# Patient Record
Sex: FEMALE | Race: WHITE | NOT HISPANIC OR LATINO | Employment: OTHER | URBAN - METROPOLITAN AREA
[De-identification: names, ages, dates, MRNs, and addresses within clinical notes are randomized per-mention and may not be internally consistent; named-entity substitution may affect disease eponyms.]

---

## 2019-09-27 ENCOUNTER — APPOINTMENT (OUTPATIENT)
Dept: CARDIOLOGY | Facility: MEDICAL CENTER | Age: 59
DRG: 286 | End: 2019-09-27
Attending: INTERNAL MEDICINE
Payer: COMMERCIAL

## 2019-09-27 ENCOUNTER — HOSPITAL ENCOUNTER (INPATIENT)
Facility: MEDICAL CENTER | Age: 59
LOS: 4 days | DRG: 286 | End: 2019-10-02
Attending: EMERGENCY MEDICINE | Admitting: INTERNAL MEDICINE
Payer: COMMERCIAL

## 2019-09-27 ENCOUNTER — APPOINTMENT (OUTPATIENT)
Dept: RADIOLOGY | Facility: MEDICAL CENTER | Age: 59
DRG: 286 | End: 2019-09-27
Attending: EMERGENCY MEDICINE
Payer: COMMERCIAL

## 2019-09-27 DIAGNOSIS — I50.9 ACUTE CONGESTIVE HEART FAILURE, UNSPECIFIED HEART FAILURE TYPE (HCC): ICD-10-CM

## 2019-09-27 DIAGNOSIS — I50.9 NEW ONSET OF CONGESTIVE HEART FAILURE (HCC): ICD-10-CM

## 2019-09-27 PROBLEM — E03.9 HYPOTHYROIDISM: Status: ACTIVE | Noted: 2019-09-27

## 2019-09-27 PROBLEM — R79.89 ELEVATED TROPONIN: Status: ACTIVE | Noted: 2019-09-27

## 2019-09-27 PROBLEM — K21.9 GERD (GASTROESOPHAGEAL REFLUX DISEASE): Status: ACTIVE | Noted: 2019-09-27

## 2019-09-27 PROBLEM — E66.9 OBESITY: Status: ACTIVE | Noted: 2019-09-27

## 2019-09-27 PROBLEM — E78.5 HYPERLIPIDEMIA: Status: ACTIVE | Noted: 2019-09-27

## 2019-09-27 LAB
ALBUMIN SERPL BCP-MCNC: 4.1 G/DL (ref 3.2–4.9)
ALBUMIN/GLOB SERPL: 1.8 G/DL
ALP SERPL-CCNC: 56 U/L (ref 30–99)
ALT SERPL-CCNC: 30 U/L (ref 2–50)
ANION GAP SERPL CALC-SCNC: 8 MMOL/L (ref 0–11.9)
APTT PPP: 26.5 SEC (ref 24.7–36)
AST SERPL-CCNC: 25 U/L (ref 12–45)
BASOPHILS # BLD AUTO: 0.5 % (ref 0–1.8)
BASOPHILS # BLD: 0.05 K/UL (ref 0–0.12)
BILIRUB SERPL-MCNC: 0.9 MG/DL (ref 0.1–1.5)
BUN SERPL-MCNC: 18 MG/DL (ref 8–22)
CALCIUM SERPL-MCNC: 9.2 MG/DL (ref 8.5–10.5)
CHLORIDE SERPL-SCNC: 106 MMOL/L (ref 96–112)
CO2 SERPL-SCNC: 27 MMOL/L (ref 20–33)
CREAT SERPL-MCNC: 0.92 MG/DL (ref 0.5–1.4)
EKG IMPRESSION: NORMAL
EOSINOPHIL # BLD AUTO: 0.15 K/UL (ref 0–0.51)
EOSINOPHIL NFR BLD: 1.6 % (ref 0–6.9)
ERYTHROCYTE [DISTWIDTH] IN BLOOD BY AUTOMATED COUNT: 55 FL (ref 35.9–50)
EST. AVERAGE GLUCOSE BLD GHB EST-MCNC: 137 MG/DL
GLOBULIN SER CALC-MCNC: 2.3 G/DL (ref 1.9–3.5)
GLUCOSE SERPL-MCNC: 93 MG/DL (ref 65–99)
HBA1C MFR BLD: 6.4 % (ref 0–5.6)
HCT VFR BLD AUTO: 42.3 % (ref 37–47)
HGB BLD-MCNC: 13.5 G/DL (ref 12–16)
IMM GRANULOCYTES # BLD AUTO: 0.03 K/UL (ref 0–0.11)
IMM GRANULOCYTES NFR BLD AUTO: 0.3 % (ref 0–0.9)
INR PPP: 1.02 (ref 0.87–1.13)
LYMPHOCYTES # BLD AUTO: 2.24 K/UL (ref 1–4.8)
LYMPHOCYTES NFR BLD: 24 % (ref 22–41)
MAGNESIUM SERPL-MCNC: 2.2 MG/DL (ref 1.5–2.5)
MCH RBC QN AUTO: 30.3 PG (ref 27–33)
MCHC RBC AUTO-ENTMCNC: 31.9 G/DL (ref 33.6–35)
MCV RBC AUTO: 94.8 FL (ref 81.4–97.8)
MONOCYTES # BLD AUTO: 0.69 K/UL (ref 0–0.85)
MONOCYTES NFR BLD AUTO: 7.4 % (ref 0–13.4)
NEUTROPHILS # BLD AUTO: 6.18 K/UL (ref 2–7.15)
NEUTROPHILS NFR BLD: 66.2 % (ref 44–72)
NRBC # BLD AUTO: 0 K/UL
NRBC BLD-RTO: 0 /100 WBC
NT-PROBNP SERPL IA-MCNC: 6507 PG/ML (ref 0–125)
PLATELET # BLD AUTO: 311 K/UL (ref 164–446)
PMV BLD AUTO: 9.1 FL (ref 9–12.9)
POTASSIUM SERPL-SCNC: 3.6 MMOL/L (ref 3.6–5.5)
PROT SERPL-MCNC: 6.4 G/DL (ref 6–8.2)
PROTHROMBIN TIME: 13.6 SEC (ref 12–14.6)
RBC # BLD AUTO: 4.46 M/UL (ref 4.2–5.4)
SODIUM SERPL-SCNC: 141 MMOL/L (ref 135–145)
T4 FREE SERPL-MCNC: 1.64 NG/DL (ref 0.53–1.43)
TROPONIN T SERPL-MCNC: 33 NG/L (ref 6–19)
TROPONIN T SERPL-MCNC: 34 NG/L (ref 6–19)
TROPONIN T SERPL-MCNC: 34 NG/L (ref 6–19)
TSH SERPL DL<=0.005 MIU/L-ACNC: 0.02 UIU/ML (ref 0.38–5.33)
WBC # BLD AUTO: 9.3 K/UL (ref 4.8–10.8)

## 2019-09-27 PROCEDURE — 99205 OFFICE O/P NEW HI 60 MIN: CPT | Performed by: INTERNAL MEDICINE

## 2019-09-27 PROCEDURE — 93306 TTE W/DOPPLER COMPLETE: CPT

## 2019-09-27 PROCEDURE — 94760 N-INVAS EAR/PLS OXIMETRY 1: CPT

## 2019-09-27 PROCEDURE — 96376 TX/PRO/DX INJ SAME DRUG ADON: CPT

## 2019-09-27 PROCEDURE — 99220 PR INITIAL OBSERVATION CARE,LEVL III: CPT | Performed by: INTERNAL MEDICINE

## 2019-09-27 PROCEDURE — 84484 ASSAY OF TROPONIN QUANT: CPT

## 2019-09-27 PROCEDURE — 83880 ASSAY OF NATRIURETIC PEPTIDE: CPT

## 2019-09-27 PROCEDURE — 90471 IMMUNIZATION ADMIN: CPT

## 2019-09-27 PROCEDURE — 96374 THER/PROPH/DIAG INJ IV PUSH: CPT

## 2019-09-27 PROCEDURE — 99285 EMERGENCY DEPT VISIT HI MDM: CPT

## 2019-09-27 PROCEDURE — G0378 HOSPITAL OBSERVATION PER HR: HCPCS

## 2019-09-27 PROCEDURE — 96372 THER/PROPH/DIAG INJ SC/IM: CPT

## 2019-09-27 PROCEDURE — 85730 THROMBOPLASTIN TIME PARTIAL: CPT

## 2019-09-27 PROCEDURE — 700102 HCHG RX REV CODE 250 W/ 637 OVERRIDE(OP): Performed by: EMERGENCY MEDICINE

## 2019-09-27 PROCEDURE — 700117 HCHG RX CONTRAST REV CODE 255: Performed by: INTERNAL MEDICINE

## 2019-09-27 PROCEDURE — 84443 ASSAY THYROID STIM HORMONE: CPT

## 2019-09-27 PROCEDURE — 90686 IIV4 VACC NO PRSV 0.5 ML IM: CPT | Performed by: INTERNAL MEDICINE

## 2019-09-27 PROCEDURE — 85610 PROTHROMBIN TIME: CPT

## 2019-09-27 PROCEDURE — 36415 COLL VENOUS BLD VENIPUNCTURE: CPT

## 2019-09-27 PROCEDURE — 700111 HCHG RX REV CODE 636 W/ 250 OVERRIDE (IP): Performed by: INTERNAL MEDICINE

## 2019-09-27 PROCEDURE — 71045 X-RAY EXAM CHEST 1 VIEW: CPT

## 2019-09-27 PROCEDURE — 80053 COMPREHEN METABOLIC PANEL: CPT

## 2019-09-27 PROCEDURE — 3E02340 INTRODUCTION OF INFLUENZA VACCINE INTO MUSCLE, PERCUTANEOUS APPROACH: ICD-10-PCS | Performed by: INTERNAL MEDICINE

## 2019-09-27 PROCEDURE — 700111 HCHG RX REV CODE 636 W/ 250 OVERRIDE (IP): Performed by: EMERGENCY MEDICINE

## 2019-09-27 PROCEDURE — 84439 ASSAY OF FREE THYROXINE: CPT

## 2019-09-27 PROCEDURE — 93005 ELECTROCARDIOGRAM TRACING: CPT | Performed by: EMERGENCY MEDICINE

## 2019-09-27 PROCEDURE — A9270 NON-COVERED ITEM OR SERVICE: HCPCS | Performed by: INTERNAL MEDICINE

## 2019-09-27 PROCEDURE — A9270 NON-COVERED ITEM OR SERVICE: HCPCS | Performed by: EMERGENCY MEDICINE

## 2019-09-27 PROCEDURE — 700102 HCHG RX REV CODE 250 W/ 637 OVERRIDE(OP): Performed by: INTERNAL MEDICINE

## 2019-09-27 PROCEDURE — 83036 HEMOGLOBIN GLYCOSYLATED A1C: CPT

## 2019-09-27 PROCEDURE — 83735 ASSAY OF MAGNESIUM: CPT

## 2019-09-27 PROCEDURE — 93005 ELECTROCARDIOGRAM TRACING: CPT

## 2019-09-27 PROCEDURE — 85025 COMPLETE CBC W/AUTO DIFF WBC: CPT

## 2019-09-27 RX ORDER — ONDANSETRON 4 MG/1
4 TABLET, ORALLY DISINTEGRATING ORAL EVERY 4 HOURS PRN
Status: DISCONTINUED | OUTPATIENT
Start: 2019-09-27 | End: 2019-10-02 | Stop reason: HOSPADM

## 2019-09-27 RX ORDER — LEVOTHYROXINE SODIUM 0.2 MG/1
200 TABLET ORAL
COMMUNITY
End: 2022-02-09

## 2019-09-27 RX ORDER — OMEPRAZOLE 20 MG/1
20 CAPSULE, DELAYED RELEASE ORAL DAILY
Status: DISCONTINUED | OUTPATIENT
Start: 2019-09-28 | End: 2019-10-02 | Stop reason: HOSPADM

## 2019-09-27 RX ORDER — LEVOTHYROXINE SODIUM 0.1 MG/1
200 TABLET ORAL
Status: DISCONTINUED | OUTPATIENT
Start: 2019-09-28 | End: 2019-10-02 | Stop reason: HOSPADM

## 2019-09-27 RX ORDER — FUROSEMIDE 10 MG/ML
40 INJECTION INTRAMUSCULAR; INTRAVENOUS
Status: DISCONTINUED | OUTPATIENT
Start: 2019-09-27 | End: 2019-10-02

## 2019-09-27 RX ORDER — FUROSEMIDE 10 MG/ML
40 INJECTION INTRAMUSCULAR; INTRAVENOUS ONCE
Status: COMPLETED | OUTPATIENT
Start: 2019-09-27 | End: 2019-09-27

## 2019-09-27 RX ORDER — RABEPRAZOLE SODIUM 20 MG/1
20 TABLET, DELAYED RELEASE ORAL DAILY
Status: ON HOLD | COMMUNITY
End: 2019-10-02

## 2019-09-27 RX ORDER — POLYETHYLENE GLYCOL 3350 17 G/17G
1 POWDER, FOR SOLUTION ORAL
Status: DISCONTINUED | OUTPATIENT
Start: 2019-09-27 | End: 2019-10-02 | Stop reason: HOSPADM

## 2019-09-27 RX ORDER — SIMVASTATIN 40 MG
40 TABLET ORAL NIGHTLY
Status: ON HOLD | COMMUNITY
End: 2019-10-02

## 2019-09-27 RX ORDER — PROCHLORPERAZINE EDISYLATE 5 MG/ML
5-10 INJECTION INTRAMUSCULAR; INTRAVENOUS EVERY 4 HOURS PRN
Status: DISCONTINUED | OUTPATIENT
Start: 2019-09-27 | End: 2019-10-02 | Stop reason: HOSPADM

## 2019-09-27 RX ORDER — PROMETHAZINE HYDROCHLORIDE 25 MG/1
12.5-25 TABLET ORAL EVERY 4 HOURS PRN
Status: DISCONTINUED | OUTPATIENT
Start: 2019-09-27 | End: 2019-10-02 | Stop reason: HOSPADM

## 2019-09-27 RX ORDER — ACETAMINOPHEN 325 MG/1
650 TABLET ORAL EVERY 6 HOURS PRN
Status: DISCONTINUED | OUTPATIENT
Start: 2019-09-27 | End: 2019-10-02 | Stop reason: HOSPADM

## 2019-09-27 RX ORDER — ASPIRIN 81 MG/1
81 TABLET, CHEWABLE ORAL DAILY
Status: DISCONTINUED | OUTPATIENT
Start: 2019-09-27 | End: 2019-10-02 | Stop reason: HOSPADM

## 2019-09-27 RX ORDER — AMOXICILLIN 250 MG
2 CAPSULE ORAL 2 TIMES DAILY
Status: DISCONTINUED | OUTPATIENT
Start: 2019-09-27 | End: 2019-10-02 | Stop reason: HOSPADM

## 2019-09-27 RX ORDER — CLONIDINE HYDROCHLORIDE 0.1 MG/1
0.1 TABLET ORAL EVERY 6 HOURS PRN
Status: DISCONTINUED | OUTPATIENT
Start: 2019-09-27 | End: 2019-10-02 | Stop reason: HOSPADM

## 2019-09-27 RX ORDER — SIMVASTATIN 20 MG
20 TABLET ORAL NIGHTLY
Status: DISCONTINUED | OUTPATIENT
Start: 2019-09-27 | End: 2019-10-02 | Stop reason: HOSPADM

## 2019-09-27 RX ORDER — BISACODYL 10 MG
10 SUPPOSITORY, RECTAL RECTAL
Status: DISCONTINUED | OUTPATIENT
Start: 2019-09-27 | End: 2019-10-02 | Stop reason: HOSPADM

## 2019-09-27 RX ORDER — HEPARIN SODIUM 5000 [USP'U]/ML
5000 INJECTION, SOLUTION INTRAVENOUS; SUBCUTANEOUS EVERY 8 HOURS
Status: DISCONTINUED | OUTPATIENT
Start: 2019-09-27 | End: 2019-10-02 | Stop reason: HOSPADM

## 2019-09-27 RX ORDER — ENALAPRILAT 1.25 MG/ML
1.25 INJECTION INTRAVENOUS EVERY 6 HOURS PRN
Status: DISCONTINUED | OUTPATIENT
Start: 2019-09-27 | End: 2019-10-02 | Stop reason: HOSPADM

## 2019-09-27 RX ORDER — ONDANSETRON 2 MG/ML
4 INJECTION INTRAMUSCULAR; INTRAVENOUS EVERY 4 HOURS PRN
Status: DISCONTINUED | OUTPATIENT
Start: 2019-09-27 | End: 2019-10-02 | Stop reason: HOSPADM

## 2019-09-27 RX ORDER — MAGNESIUM OXIDE 400 MG/1
400 TABLET ORAL
COMMUNITY
End: 2019-09-27

## 2019-09-27 RX ORDER — PROMETHAZINE HYDROCHLORIDE 25 MG/1
12.5-25 SUPPOSITORY RECTAL EVERY 4 HOURS PRN
Status: DISCONTINUED | OUTPATIENT
Start: 2019-09-27 | End: 2019-10-02 | Stop reason: HOSPADM

## 2019-09-27 RX ADMIN — HEPARIN SODIUM 5000 UNITS: 5000 INJECTION INTRAVENOUS; SUBCUTANEOUS at 21:14

## 2019-09-27 RX ADMIN — ASPIRIN 325 MG: 325 TABLET, COATED ORAL at 09:49

## 2019-09-27 RX ADMIN — INFLUENZA A VIRUS A/BRISBANE/02/2018 IVR-190 (H1N1) ANTIGEN (FORMALDEHYDE INACTIVATED), INFLUENZA A VIRUS A/KANSAS/14/2017 X-327 (H3N2) ANTIGEN (FORMALDEHYDE INACTIVATED), INFLUENZA B VIRUS B/PHUKET/3073/2013 ANTIGEN (FORMALDEHYDE INACTIVATED), AND INFLUENZA B VIRUS B/MARYLAND/15/2016 BX-69A ANTIGEN (FORMALDEHYDE INACTIVATED) 0.5 ML: 15; 15; 15; 15 INJECTION, SUSPENSION INTRAMUSCULAR at 15:46

## 2019-09-27 RX ADMIN — HEPARIN SODIUM 5000 UNITS: 5000 INJECTION INTRAVENOUS; SUBCUTANEOUS at 15:46

## 2019-09-27 RX ADMIN — SIMVASTATIN 20 MG: 20 TABLET, FILM COATED ORAL at 21:16

## 2019-09-27 RX ADMIN — FUROSEMIDE 40 MG: 10 INJECTION, SOLUTION INTRAMUSCULAR; INTRAVENOUS at 09:49

## 2019-09-27 RX ADMIN — HUMAN ALBUMIN MICROSPHERES AND PERFLUTREN 3 ML: 10; .22 INJECTION, SOLUTION INTRAVENOUS at 20:30

## 2019-09-27 RX ADMIN — FUROSEMIDE 40 MG: 10 INJECTION, SOLUTION INTRAMUSCULAR; INTRAVENOUS at 15:45

## 2019-09-27 ASSESSMENT — COGNITIVE AND FUNCTIONAL STATUS - GENERAL
DAILY ACTIVITIY SCORE: 24
SUGGESTED CMS G CODE MODIFIER DAILY ACTIVITY: CH
MOBILITY SCORE: 24
SUGGESTED CMS G CODE MODIFIER MOBILITY: CH

## 2019-09-27 ASSESSMENT — PATIENT HEALTH QUESTIONNAIRE - PHQ9
1. LITTLE INTEREST OR PLEASURE IN DOING THINGS: NOT AT ALL
SUM OF ALL RESPONSES TO PHQ9 QUESTIONS 1 AND 2: 0
2. FEELING DOWN, DEPRESSED, IRRITABLE, OR HOPELESS: NOT AT ALL

## 2019-09-27 ASSESSMENT — COPD QUESTIONNAIRES
IN THE PAST 12 MONTHS DO YOU DO LESS THAN YOU USED TO BECAUSE OF YOUR BREATHING PROBLEMS: DISAGREE/UNSURE
DO YOU EVER COUGH UP ANY MUCUS OR PHLEGM?: YES, A FEW DAYS A WEEK OR MONTH
DURING THE PAST 4 WEEKS HOW MUCH DID YOU FEEL SHORT OF BREATH: SOME OF THE TIME
COPD SCREENING SCORE: 6
DURING THE PAST 4 WEEKS HOW MUCH DID YOU FEEL SHORT OF BREATH: MOST  OR ALL OF THE TIME
COPD SCREENING SCORE: 6
HAVE YOU SMOKED AT LEAST 100 CIGARETTES IN YOUR ENTIRE LIFE: YES
HAVE YOU SMOKED AT LEAST 100 CIGARETTES IN YOUR ENTIRE LIFE: YES
DO YOU EVER COUGH UP ANY MUCUS OR PHLEGM?: NO/ONLY WITH OCCASIONAL COLDS OR INFECTIONS

## 2019-09-27 ASSESSMENT — LIFESTYLE VARIABLES
EVER HAD A DRINK FIRST THING IN THE MORNING TO STEADY YOUR NERVES TO GET RID OF A HANGOVER: NO
EVER FELT BAD OR GUILTY ABOUT YOUR DRINKING: NO
TOTAL SCORE: 0
HOW MANY TIMES IN THE PAST YEAR HAVE YOU HAD 5 OR MORE DRINKS IN A DAY: 0
TOTAL SCORE: 0
TOTAL SCORE: 0
HAVE YOU EVER FELT YOU SHOULD CUT DOWN ON YOUR DRINKING: NO
ALCOHOL_USE: NO
DOES PATIENT WANT TO STOP DRINKING: NO
CONSUMPTION TOTAL: NEGATIVE
EVER_SMOKED: YES
HAVE PEOPLE ANNOYED YOU BY CRITICIZING YOUR DRINKING: NO
EVER_SMOKED: YES
ON A TYPICAL DAY WHEN YOU DRINK ALCOHOL HOW MANY DRINKS DO YOU HAVE: 0
AVERAGE NUMBER OF DAYS PER WEEK YOU HAVE A DRINK CONTAINING ALCOHOL: 0

## 2019-09-27 NOTE — CONSULTS
Cardiology Initial Consultation    Date of Service  2019    Referring Physician  Garrick Mejia M.D.    Reason for Consultation  New diagnosis of CHF, PERAZA    History of Presenting Illness  Ivelisse Montague is a 59 y.o. female who presented 2019 with worsening shortness of breath. Noted starting just over a month ago, she was having worsening shortness of breath with exertion and lower extremity edema.  Upon presentation, she was feeling short of breath at rest.    She is not limited by chest pain, pressure or tightness.   No significant palpitations, dizziness, or presyncope/syncope.   No symptoms of leg claudication.   No stroke/TIA like symptoms.    Mother had congestive heart failure but later in life in her 70s and had diabetes.  Father started having MIs in his 60s  of an MI at 63.  Brother  of MI at 23.  2 other brothers had MIs starting in their 60s and 70s.    Has hyperlipidemia, takes simvastatin, previously on Crestor but insurance stopped covering this.  Was previously a smoker for 40 years but quit in .  Has hypothyroidism, on therapy  Has gastroesophageal reflux disease.    No hypertension, no diabetes, no chronic kidney disease, no lupus or rheumatoid arthritis.  Blood pressure has been normal.    Troponin T 34  proBNP 6507  TSH 0.02  Sodium 143, potassium 3.6, creatinine 0.92, LFTs normal    Hemoglobin 13.5, platelets 311    Reports she is having some leg cramps.    Review of Systems  Review of Systems   All other systems reviewed and are negative.      Past Medical History   has a past medical history of GERD (gastroesophageal reflux disease), Hyperlipidemia, RLS (restless legs syndrome), and Thyroid disease.    Surgical History   has a past surgical history that includes knee replacement, total.    Family History  family history is not on file.    Social History   reports that she quit smoking about 11 years ago. Her smoking use included cigarettes. She has never used  smokeless tobacco. She reports that she does not drink alcohol or use drugs.    Medications  Prior to Admission Medications   Prescriptions Last Dose Informant Patient Reported? Taking?   levothyroxine (SYNTHROID) 200 MCG Tab 9/27/2019 at Unknown time Patient's Home Pharmacy Yes Yes   Sig: Take 200 mcg by mouth Every morning on an empty stomach.   methylPREDNISolone (MEDROL, ARTURO, PO) 9/26/2019 at COMPLETED Patient's Home Pharmacy Yes Yes   Sig: Take 1 Dose by mouth See Admin Instructions.   rabeprazole (ACIPHEX) 20 MG tablet 9/27/2019 at AM Patient's Home Pharmacy Yes Yes   Sig: Take 20 mg by mouth every day.   simvastatin (ZOCOR) 40 MG Tab 9/26/2019 at PM Patient's Home Pharmacy Yes Yes   Sig: Take 40 mg by mouth every evening.      Facility-Administered Medications: None       Allergies  Allergies   Allergen Reactions   • Codeine Vomiting   • Lipitor [Atorvastatin Calcium]        Vital signs in last 24 hours  Temp:  [36.1 °C (96.9 °F)-36.6 °C (97.8 °F)] 36.1 °C (96.9 °F)  Pulse:  [72-98] 92  Resp:  [18-22] 20  BP: (117-135)/(77-98) 118/83  SpO2:  [93 %-98 %] 98 %    Physical Exam  Physical Exam   General: No acute distress. Well nourished.  HEENT: EOM grossly intact, no scleral icterus, no pharyngeal erythema.   Neck:  No JVD, no bruits, trachea midline  CVS: RRR. Normal S1, S2. No M/R/G. No LE edema.  2+ radial pulses, 2+ PT pulses  Resp: CTAB. No wheezing or crackles/rhonchi. Normal respiratory effort.  Abdomen: Soft, NT, no tricia hepatomegaly.  MSK/Ext: No clubbing or cyanosis.  Skin: Warm and dry, no rashes.  Neurological: CN III-XII grossly intact. No focal deficits.   Psych: A&O x 3, appropriate affect, good judgement      Lab Review  Lab Results   Component Value Date/Time    WBC 9.3 09/27/2019 08:45 AM    RBC 4.46 09/27/2019 08:45 AM    HEMOGLOBIN 13.5 09/27/2019 08:45 AM    HEMATOCRIT 42.3 09/27/2019 08:45 AM    MCV 94.8 09/27/2019 08:45 AM    MCH 30.3 09/27/2019 08:45 AM    MCHC 31.9 (L) 09/27/2019 08:45  AM    MPV 9.1 09/27/2019 08:45 AM      Lab Results   Component Value Date/Time    SODIUM 141 09/27/2019 08:45 AM    POTASSIUM 3.6 09/27/2019 08:45 AM    CHLORIDE 106 09/27/2019 08:45 AM    CO2 27 09/27/2019 08:45 AM    GLUCOSE 93 09/27/2019 08:45 AM    BUN 18 09/27/2019 08:45 AM    CREATININE 0.92 09/27/2019 08:45 AM      Lab Results   Component Value Date/Time    ASTSGOT 25 09/27/2019 08:45 AM    ALTSGPT 30 09/27/2019 08:45 AM     Lab Results   Component Value Date/Time    TROPONINT 33 (H) 09/27/2019 04:04 PM       Recent Labs     09/27/19  0845   NTPROBNP 6507*       Cardiac Imaging and Procedures Review  EKG:  My personal interpretation of the EKG dated 9/27/2019 is sinus, 93, nonspecific T wave changes, PVC    Echocardiogram: Pending      Imaging  Chest X-Ray: 9/27/2019  Left mid to lower lung opacity.  There is no evidence of pleural effusion.  Cardiomegaly.      Assessment/Plan  -New onset heart failure, EF not yet known  -Strong family history of premature coronary artery disease  -Prior 40-year tobacco history, quit 2007  -Hyperlipidemia  -PVCs    Plan:  -Echocardiogram pending  -Diuresis  -Medications based on echo finding  -Left heart catheterization she has a low EF given her strong family history    Case discussed with Garrick Mejia M.D.     Thank you for allowing me to participate in the care of this patient.    I will continue to follow this patient    Please contact me with any questions.    Yoli Sebastian M.D.   Cardiologist, [facility]  [phone]

## 2019-09-27 NOTE — ASSESSMENT & PLAN NOTE
-cont on  diuresis with IV Lasix 40 mg twice daily.  Close intake and output monitoring, and daily weights.    -Echo showing EF of 20%   Cardiac cath today.

## 2019-09-27 NOTE — ED PROVIDER NOTES
ED Provider Note    CHIEF COMPLAINT  Chief Complaint   Patient presents with   • Shortness of Breath   • Ankle Swelling       HPI  Ivelisse Montague is a 59 y.o. female who presents to the emergency department complaining of shortness of breath leg edema getting worse for the last couple of months.  The patient called her doctor and was told that she needed to come in for a chest x-ray and at the time of arrival she states that she only wants a chest x-ray.  She does not recognize any specific exacerbating or alleviating factors otherwise.  She says she is never been diagnosed with congestive heart failure or pulmonary edema.    REVIEW OF SYSTEMS no fever or chills no hemoptysis.  All other systems negative    PAST MEDICAL HISTORY  Past Medical History:   Diagnosis Date   • GERD (gastroesophageal reflux disease)    • Hyperlipidemia    • RLS (restless legs syndrome)    • Thyroid disease        FAMILY HISTORY  History reviewed. No pertinent family history.    SOCIAL HISTORY  Social History     Socioeconomic History   • Marital status: Single     Spouse name: Not on file   • Number of children: Not on file   • Years of education: Not on file   • Highest education level: Not on file   Occupational History   • Not on file   Social Needs   • Financial resource strain: Not on file   • Food insecurity:     Worry: Not on file     Inability: Not on file   • Transportation needs:     Medical: Not on file     Non-medical: Not on file   Tobacco Use   • Smoking status: Former Smoker     Types: Cigarettes     Last attempt to quit: 2008     Years since quittin.4   • Smokeless tobacco: Never Used   Substance and Sexual Activity   • Alcohol use: No   • Drug use: No   • Sexual activity: Not on file   Lifestyle   • Physical activity:     Days per week: Not on file     Minutes per session: Not on file   • Stress: Not on file   Relationships   • Social connections:     Talks on phone: Not on file     Gets together: Not on  "file     Attends Mandaen service: Not on file     Active member of club or organization: Not on file     Attends meetings of clubs or organizations: Not on file     Relationship status: Not on file   • Intimate partner violence:     Fear of current or ex partner: Not on file     Emotionally abused: Not on file     Physically abused: Not on file     Forced sexual activity: Not on file   Other Topics Concern   • Not on file   Social History Narrative   • Not on file       SURGICAL HISTORY  Past Surgical History:   Procedure Laterality Date   • KNEE REPLACEMENT, TOTAL         CURRENT MEDICATIONS  Home Medications     Reviewed by Darian Connor (Pharmacy Tech) on 09/27/19 at 1022  Med List Status: Complete   Medication Last Dose Status   levothyroxine (SYNTHROID) 200 MCG Tab 9/27/2019 Active   methylPREDNISolone (MEDROL, ARTURO, PO) 9/26/2019 Active   rabeprazole (ACIPHEX) 20 MG tablet 9/27/2019 Active   simvastatin (ZOCOR) 40 MG Tab 9/26/2019 Active                ALLERGIES  Allergies   Allergen Reactions   • Codeine Vomiting   • Lipitor [Atorvastatin Calcium]        PHYSICAL EXAM  VITAL SIGNS: /77   Pulse 88   Temp 36.6 °C (97.8 °F) (Temporal)   Resp 20   Ht 1.575 m (5' 2\")   Wt 100.4 kg (221 lb 5.5 oz)   SpO2 93%   BMI 40.48 kg/m²    Oxygen saturation is interpreted as adequate  Constitutional: Awake verbal and short of breath with any activity even having a discussion.  HENT: Mucous membranes are moist  Eyes: No erythema discharge or jaundice  Neck: Trachea midline no JVD  Cardiovascular: Regular rate and rhythm  Lungs: Distant bilaterally and the patient appears short of breath  Abdomen/Back: Obese nondistended nontender no peritoneal findings bowel sounds present  Skin: Warm and dry  Musculoskeletal: There is symmetric 1+ edema of the lower extremities bilaterally  Neurologic: Awake verbal moving all extremities    Laboratory  CBC shows white blood cell count of 9.3 hemoglobin is adequate 13.5 " complete metabolic panel is unremarkable troponin is elevated at 34 the BNP is very high at 6507 INR is 1.02 TSH is very low at 0.02 indicating hyperthyroidism    EKG interpretation  Twelve-lead EKG shows sinus rhythm 93 bpm there is a left axis deviation there is no clear ST elevation or depression there is a single PVC on the tracing QTc interval is prolonged at 528 ms    Radiology  DX-CHEST-PORTABLE (1 VIEW)   Final Result      Left mid to lower lung atelectasis possible pneumonitis.      EC-ECHOCARDIOGRAM COMPLETE W/O CONT    (Results Pending)     MEDICAL DECISION MAKING and DISPOSITION  In the emergency department I reviewed the patient's symptoms with her and recommended that we do more evaluation than just a chest x-ray and after some discussion she did consent to this in the evaluation reviewed above was undertaken.  The patient appears to be in acute pulmonary edema likely congestive heart failure.  She was started on aspirin and given Lasix.  In addition her QTc interval is prolonged and she has hyperthyroidism.  I reviewed the findings available with the patient and I reviewed the case with the hospitalist and the patient is admitted for further evaluation and treatment    IMPRESSION  1.  Acute pulmonary edema  2.  Prolonged QTc interval  3.  Hyperthyroidism          Electronically signed by: Garrick Mejia, 9/27/2019 2:42 PM

## 2019-09-27 NOTE — ED TRIAGE NOTES
Patient comes in requesting an x-ray.  Called pcp and told to come here as she is feeling increasing sob and noted edema to her legs.  Patient is only wanting a chest x-ray.  Explained to patient the process and apologized for delay.

## 2019-09-27 NOTE — PROGRESS NOTES
Received report from SAUL Navarro (ED). Patient is A&Ox4. Patient transported via [facility] with ACLS RN and Zoll monitor. Patient arrived to unit, placed on tele box. Confirmed SR with monitor room. Patient ambulated steadily with standby assist from rney to hospital bed, tolerated well. Pt oriented to unit and call light, verbalized understanding. Fall precautions in place. Call light and belongings within reach. Bed locked and in lowest position. Assessment completed, will continue to monitor.

## 2019-09-27 NOTE — H&P
"Hospital Medicine History & Physical Note    Date of Service  9/27/2019    Primary Care Physician  Sly Khan M.D.    Consultants  Cardiology    Code Status  Full    Chief Complaint  Shortness of breath    History of Presenting Illness  59 y.o. female with obesity, GERD, hypothyroidism, and hyperlipidemia, nondiabetic, with no prior cardiac history, who presented 9/27/2019 with shortness of breath, and leg edema.  Patient states that she started to notice progressively worsening shortness of breath, and leg swelling in the last 2 to 3 months, which was significantly worse in the last 1 week.  She gets easily winded and dyspneic with exertion, with onset of orthopnea and paroxysmal nocturnal dyspnea.  Her leg swelling was also significantly worse in the last 1 week, with occasional \"charley horses\".  She denied any chest pain, abdominal complaints such as nausea, vomiting, bowel movement changes, cough, fevers, or chills.  She decided to go to the ED today for further evaluation.    ED course:  The patient was initially evaluated.  Vital signs were stable.  Initial blood work-up showed troponin of 34, with NT proBNP of 6507.  Her CBC and BMP were unremarkable.  Checks x-ray (personally reviewed) showed left mid lower lung opacities, with no pleural effusion, with noted cardiomegaly.  EKG (my read) showed normal sinus rhythm, with no dynamic ischemic changes.  Patient was given IV Lasix, and aspirin.  She was subsequently admitted to the hospital service.    Review of Systems  ROS     Pertinent positives/negatives as mentioned above.     A complete review of systems was personally done by me. All other systems were negative.       Past Medical History   has a past medical history of GERD (gastroesophageal reflux disease), Hyperlipidemia, RLS (restless legs syndrome), and Thyroid disease.    Surgical History   has a past surgical history that includes knee replacement, total.     Family History  Reviewed and " not pertinent.       Social History   reports that she quit smoking about 11 years ago. Her smoking use included cigarettes. She has never used smokeless tobacco. She reports that she does not drink alcohol or use drugs.    Allergies  Allergies   Allergen Reactions   • Codeine Vomiting   • Lipitor [Atorvastatin Calcium]        Medications  Prior to Admission Medications   Prescriptions Last Dose Informant Patient Reported? Taking?   aspirin (ASA) 81 MG CHEW 9/1/2019  Yes No   Sig: Take 81 mg by mouth every day.   levothyroxine (SYNTHROID) 200 MCG Tab 9/27/2019 at Unknown time  Yes Yes   Sig: Take 200 mcg by mouth Every morning on an empty stomach.   magnesium oxide (MAG-OX) 400 MG Tab 9/27/2019 at Unknown time  Yes Yes   Sig: Take 400 mg by mouth every 48 hours.   pantoprazole (PROTONIX) 40 MG TBEC 9/27/2019 at Unknown time  Yes No   Sig: Take 40 mg by mouth every day.   simvastatin (ZOCOR) 20 MG Tab 9/25/2019  Yes Yes   Sig: Take 20 mg by mouth every evening.      Facility-Administered Medications: None       Physical Exam  Temp:  [36.2 °C (97.2 °F)] 36.2 °C (97.2 °F)  Pulse:  [88-95] 88  Resp:  [22] 22  BP: (123-135)/(82-89) 123/89  SpO2:  [94 %-96 %] 95 %    Physical Exam   Constitutional: She is oriented to person, place, and time. She appears well-developed. No distress.   Morbidly obese. Body mass index is 41.33 kg/m².   HENT:   Head: Normocephalic and atraumatic.   Mouth/Throat: Oropharynx is clear and moist. No oropharyngeal exudate.   Eyes: Pupils are equal, round, and reactive to light. Conjunctivae are normal. No scleral icterus.   Neck: Normal range of motion. Neck supple. JVD present.   Cardiovascular: Normal rate and regular rhythm. Exam reveals no gallop and no friction rub.   No murmur heard.  Pulmonary/Chest: Effort normal. No respiratory distress. She has no wheezes. She has no rales. She exhibits no tenderness.   Diminished air entry B/L bases   Abdominal: Soft. Bowel sounds are normal. She  exhibits no distension. There is no tenderness. There is no rebound and no guarding.   Musculoskeletal: Normal range of motion. She exhibits edema ( 2-3+ B/L LE edema). She exhibits no tenderness.   Lymphadenopathy:     She has no cervical adenopathy.   Neurological: She is alert and oriented to person, place, and time. No cranial nerve deficit.   Skin: Skin is warm and dry. No rash noted. She is not diaphoretic. No erythema. No pallor.   Psychiatric: She has a normal mood and affect. Her behavior is normal. Judgment and thought content normal.   Nursing note and vitals reviewed.      Laboratory:  Recent Labs     09/27/19  0845   WBC 9.3   RBC 4.46   HEMOGLOBIN 13.5   HEMATOCRIT 42.3   MCV 94.8   MCH 30.3   MCHC 31.9*   RDW 55.0*   PLATELETCT 311   MPV 9.1     Recent Labs     09/27/19  0845   SODIUM 141   POTASSIUM 3.6   CHLORIDE 106   CO2 27   GLUCOSE 93   BUN 18   CREATININE 0.92   CALCIUM 9.2     Recent Labs     09/27/19  0845   ALTSGPT 30   ASTSGOT 25   ALKPHOSPHAT 56   TBILIRUBIN 0.9   GLUCOSE 93     Recent Labs     09/27/19  0845   APTT 26.5   INR 1.02     Recent Labs     09/27/19  0845   NTPROBNP 6507*         Recent Labs     09/27/19  0845   TROPONINT 34*       Urinalysis:    No results found     Imaging:  DX-CHEST-PORTABLE (1 VIEW)   Final Result      Left mid to lower lung atelectasis possible pneumonitis.      EC-ECHOCARDIOGRAM COMPLETE W/O CONT    (Results Pending)         Assessment/Plan:  I anticipate this patient is appropriate for observation status at this time.    * New onset of congestive heart failure (HCC)- (present on admission)  Assessment & Plan  -Fulfills [place] criteria for clinical CHF.  No prior history of heart failure, nor she has history of cardiac issues.  Unknown ventricular function.  -I will start her on aggressive diuresis with IV Lasix 40 mg twice daily.  Close intake and output monitoring, and daily weights.  Trend BNP.  -As this is new onset CHF, I requested the ERP to call  the cardiologist for further inputs.  Anticipate she may need coronary angiogram to rule out ischemic cause of heart failure.  Trend troponins.  Will obtain an echocardiogram to evaluate ventricular function.  -Monitor on telemetry.    Elevated troponin- (present on admission)  Assessment & Plan  -Likely demand ischemia from new onset heart failure.  No EKG changes, and no complaints of chest pain.  -Trend troponins.  Check echocardiogram to evaluate ventricular function.  Given her new onset heart failure, may need left heart catheterization.  Cardiology is consulted.    Obesity- (present on admission)  Assessment & Plan  - Body mass index is 41.33 kg/m²..  - Counseled on weight loss.  - outpatient referral for outpatient weight management.    GERD (gastroesophageal reflux disease)- (present on admission)  Assessment & Plan  -Resume home dose Protonix.    Hypothyroidism- (present on admission)  Assessment & Plan  -Resume home dose Synthroid.  Check TSH.    Hyperlipidemia- (present on admission)  Assessment & Plan  -Resume home dose Zocor.      VTE prophylaxis: heparin SQ

## 2019-09-27 NOTE — ED NOTES
Med Rec completed per patient and home pharmacy ([facility])   Allergies reviewed  No ORAL antibiotics in last 14 days    Patient completed a Medrol Dose Pack yesterday

## 2019-09-27 NOTE — ASSESSMENT & PLAN NOTE
-Likely demand ischemia from new onset heart failure.  No EKG changes, and no complaints of chest pain.  Cardiac cath today.
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
## 2019-09-28 LAB
ANION GAP SERPL CALC-SCNC: 12 MMOL/L (ref 0–11.9)
ANION GAP SERPL CALC-SCNC: 13 MMOL/L (ref 0–11.9)
BASOPHILS # BLD AUTO: 0.6 % (ref 0–1.8)
BASOPHILS # BLD: 0.05 K/UL (ref 0–0.12)
BUN SERPL-MCNC: 22 MG/DL (ref 8–22)
BUN SERPL-MCNC: 24 MG/DL (ref 8–22)
CALCIUM SERPL-MCNC: 9.5 MG/DL (ref 8.5–10.5)
CALCIUM SERPL-MCNC: 9.7 MG/DL (ref 8.5–10.5)
CHLORIDE SERPL-SCNC: 100 MMOL/L (ref 96–112)
CHLORIDE SERPL-SCNC: 101 MMOL/L (ref 96–112)
CO2 SERPL-SCNC: 27 MMOL/L (ref 20–33)
CO2 SERPL-SCNC: 28 MMOL/L (ref 20–33)
CREAT SERPL-MCNC: 1.16 MG/DL (ref 0.5–1.4)
CREAT SERPL-MCNC: 1.19 MG/DL (ref 0.5–1.4)
EOSINOPHIL # BLD AUTO: 0.2 K/UL (ref 0–0.51)
EOSINOPHIL NFR BLD: 2.4 % (ref 0–6.9)
ERYTHROCYTE [DISTWIDTH] IN BLOOD BY AUTOMATED COUNT: 54 FL (ref 35.9–50)
GLUCOSE SERPL-MCNC: 102 MG/DL (ref 65–99)
GLUCOSE SERPL-MCNC: 146 MG/DL (ref 65–99)
HCT VFR BLD AUTO: 46.3 % (ref 37–47)
HGB BLD-MCNC: 14.8 G/DL (ref 12–16)
IMM GRANULOCYTES # BLD AUTO: 0.03 K/UL (ref 0–0.11)
IMM GRANULOCYTES NFR BLD AUTO: 0.4 % (ref 0–0.9)
LV EJECT FRACT  99904: 20
LV EJECT FRACT MOD 2C 99903: 27.44
LV EJECT FRACT MOD 4C 99902: 47.41
LV EJECT FRACT MOD BP 99901: 39.99
LYMPHOCYTES # BLD AUTO: 1.78 K/UL (ref 1–4.8)
LYMPHOCYTES NFR BLD: 21.4 % (ref 22–41)
MCH RBC QN AUTO: 29.9 PG (ref 27–33)
MCHC RBC AUTO-ENTMCNC: 32 G/DL (ref 33.6–35)
MCV RBC AUTO: 93.5 FL (ref 81.4–97.8)
MONOCYTES # BLD AUTO: 0.8 K/UL (ref 0–0.85)
MONOCYTES NFR BLD AUTO: 9.6 % (ref 0–13.4)
NEUTROPHILS # BLD AUTO: 5.45 K/UL (ref 2–7.15)
NEUTROPHILS NFR BLD: 65.6 % (ref 44–72)
NRBC # BLD AUTO: 0 K/UL
NRBC BLD-RTO: 0 /100 WBC
NT-PROBNP SERPL IA-MCNC: 4386 PG/ML (ref 0–125)
PLATELET # BLD AUTO: 314 K/UL (ref 164–446)
PMV BLD AUTO: 9.4 FL (ref 9–12.9)
POTASSIUM SERPL-SCNC: 3.5 MMOL/L (ref 3.6–5.5)
POTASSIUM SERPL-SCNC: 3.6 MMOL/L (ref 3.6–5.5)
RBC # BLD AUTO: 4.95 M/UL (ref 4.2–5.4)
SODIUM SERPL-SCNC: 139 MMOL/L (ref 135–145)
SODIUM SERPL-SCNC: 142 MMOL/L (ref 135–145)
WBC # BLD AUTO: 8.3 K/UL (ref 4.8–10.8)

## 2019-09-28 PROCEDURE — 80048 BASIC METABOLIC PNL TOTAL CA: CPT

## 2019-09-28 PROCEDURE — 700102 HCHG RX REV CODE 250 W/ 637 OVERRIDE(OP): Performed by: NURSE PRACTITIONER

## 2019-09-28 PROCEDURE — 96376 TX/PRO/DX INJ SAME DRUG ADON: CPT

## 2019-09-28 PROCEDURE — 93306 TTE W/DOPPLER COMPLETE: CPT | Mod: 26 | Performed by: INTERNAL MEDICINE

## 2019-09-28 PROCEDURE — 700102 HCHG RX REV CODE 250 W/ 637 OVERRIDE(OP): Performed by: INTERNAL MEDICINE

## 2019-09-28 PROCEDURE — 99232 SBSQ HOSP IP/OBS MODERATE 35: CPT | Performed by: INTERNAL MEDICINE

## 2019-09-28 PROCEDURE — 700111 HCHG RX REV CODE 636 W/ 250 OVERRIDE (IP): Performed by: INTERNAL MEDICINE

## 2019-09-28 PROCEDURE — 770020 HCHG ROOM/CARE - TELE (206)

## 2019-09-28 PROCEDURE — 99226 PR SUBSEQUENT OBSERVATION CARE,LEVEL III: CPT | Performed by: INTERNAL MEDICINE

## 2019-09-28 PROCEDURE — 36415 COLL VENOUS BLD VENIPUNCTURE: CPT

## 2019-09-28 PROCEDURE — A9270 NON-COVERED ITEM OR SERVICE: HCPCS | Performed by: NURSE PRACTITIONER

## 2019-09-28 PROCEDURE — 96372 THER/PROPH/DIAG INJ SC/IM: CPT

## 2019-09-28 PROCEDURE — 83880 ASSAY OF NATRIURETIC PEPTIDE: CPT

## 2019-09-28 PROCEDURE — A9270 NON-COVERED ITEM OR SERVICE: HCPCS | Performed by: INTERNAL MEDICINE

## 2019-09-28 PROCEDURE — 85025 COMPLETE CBC W/AUTO DIFF WBC: CPT

## 2019-09-28 RX ORDER — LISINOPRIL 5 MG/1
10 TABLET ORAL
Status: DISCONTINUED | OUTPATIENT
Start: 2019-09-28 | End: 2019-10-02 | Stop reason: HOSPADM

## 2019-09-28 RX ORDER — METOPROLOL SUCCINATE 25 MG/1
25 TABLET, EXTENDED RELEASE ORAL
Status: DISCONTINUED | OUTPATIENT
Start: 2019-09-29 | End: 2019-10-02 | Stop reason: HOSPADM

## 2019-09-28 RX ADMIN — ASPIRIN 81 MG 81 MG: 81 TABLET ORAL at 04:54

## 2019-09-28 RX ADMIN — MAGNESIUM GLUCONATE 500 MG ORAL TABLET 400 MG: 500 TABLET ORAL at 08:27

## 2019-09-28 RX ADMIN — OMEPRAZOLE 20 MG: 20 CAPSULE, DELAYED RELEASE ORAL at 04:55

## 2019-09-28 RX ADMIN — LISINOPRIL 10 MG: 5 TABLET ORAL at 08:27

## 2019-09-28 RX ADMIN — HEPARIN SODIUM 5000 UNITS: 5000 INJECTION INTRAVENOUS; SUBCUTANEOUS at 04:55

## 2019-09-28 RX ADMIN — SIMVASTATIN 20 MG: 20 TABLET, FILM COATED ORAL at 20:09

## 2019-09-28 RX ADMIN — LEVOTHYROXINE SODIUM 200 MCG: 100 TABLET ORAL at 04:54

## 2019-09-28 RX ADMIN — FUROSEMIDE 40 MG: 10 INJECTION, SOLUTION INTRAMUSCULAR; INTRAVENOUS at 04:55

## 2019-09-28 RX ADMIN — HEPARIN SODIUM 5000 UNITS: 5000 INJECTION INTRAVENOUS; SUBCUTANEOUS at 15:19

## 2019-09-28 RX ADMIN — FUROSEMIDE 40 MG: 10 INJECTION, SOLUTION INTRAMUSCULAR; INTRAVENOUS at 15:19

## 2019-09-28 RX ADMIN — METOPROLOL TARTRATE 25 MG: 25 TABLET, FILM COATED ORAL at 08:27

## 2019-09-28 RX ADMIN — HEPARIN SODIUM 5000 UNITS: 5000 INJECTION INTRAVENOUS; SUBCUTANEOUS at 20:09

## 2019-09-28 ASSESSMENT — ENCOUNTER SYMPTOMS
PALPITATIONS: 0
CHEST TIGHTNESS: 0
CHILLS: 0
FEVER: 0
DIZZINESS: 0
TROUBLE SWALLOWING: 0
NERVOUS/ANXIOUS: 0
EYE PAIN: 0
MYALGIAS: 0
SINUS PAIN: 0
VOMITING: 0
ABDOMINAL PAIN: 0
NAUSEA: 0
NUMBNESS: 0
HEADACHES: 0
CONFUSION: 0
ABDOMINAL DISTENTION: 0
COLOR CHANGE: 0
COUGH: 0
EYE DISCHARGE: 0
DIAPHORESIS: 0
NECK PAIN: 0
AGITATION: 0
BLOOD IN STOOL: 0
SHORTNESS OF BREATH: 1

## 2019-09-28 NOTE — PROGRESS NOTES
[facility] Medicine Daily Progress Note    Date of Service  9/28/2019    Chief Complaint  59 y.o. female admitted 9/27/2019 with shortness of breath    Hospital Course    This is a 60 y/o F with obesity, GERD, hypothyroidism, and hyperlipidemia, nondiabetic, with no prior cardiac history, who was admitted on 9/27/19 after presenting to ER complaining of shortness of breath and leg edema  in the last 2 to 3 months, which was significantly worse in the last 1 week. Pt has been found to have new diagnosis of systolic heart failure, and cardiology has been consulted.        Interval Problem Update  Pt seen and examined, denies chest pain, but still feeling SOB, no nausea or vomiting.  Cardiology following appreciate rec.  Plan for cardiac cath on Monday      Consultants/Specialty  Cardiology    Code Status  Full code     Disposition  TBD     Review of Systems  Review of Systems   Constitutional: Negative for chills and fever.   HENT: Negative for ear pain and sinus pain.    Eyes: Negative for pain and discharge.   Respiratory: Positive for shortness of breath. Negative for cough.    Cardiovascular: Negative for chest pain and palpitations.   Gastrointestinal: Negative for abdominal pain, nausea and vomiting.   Genitourinary: Negative for dysuria and urgency.   Musculoskeletal: Negative for myalgias and neck pain.   Neurological: Negative for dizziness and headaches.   All other systems reviewed and are negative.       Physical Exam  Temp:  [36 °C (96.8 °F)-37.1 °C (98.8 °F)] 36 °C (96.8 °F)  Pulse:  [] 60  Resp:  [18-20] 20  BP: (102-152)/(62-96) 133/77  SpO2:  [91 %-98 %] 94 %    Physical Exam   Constitutional: She is oriented to person, place, and time. She appears well-developed. No distress.   Morbidly obese. Body mass index is 41.33 kg/m².   HENT:   Head: Normocephalic and atraumatic.   Mouth/Throat: Oropharynx is clear and moist. No oropharyngeal exudate.   Eyes: Pupils are equal, round, and reactive to light.  Conjunctivae are normal. No scleral icterus.   Neck: Normal range of motion. Neck supple. JVD present.   Cardiovascular: Normal rate and regular rhythm. Exam reveals no gallop and no friction rub.   No murmur heard.  Pulmonary/Chest: Effort normal. No respiratory distress. She has no rales.   Diminished air entry B/L bases   Abdominal: Soft. Bowel sounds are normal. She exhibits no distension. There is no tenderness.   Musculoskeletal: Normal range of motion. She exhibits edema ( 2-3+ B/L LE edema). She exhibits no tenderness.   Lymphadenopathy:     She has no cervical adenopathy.   Neurological: She is alert and oriented to person, place, and time. No cranial nerve deficit.   Skin: Skin is warm and dry. She is not diaphoretic. No erythema.   Psychiatric: She has a normal mood and affect. Her behavior is normal. Judgment and thought content normal.   Nursing note and vitals reviewed.      Fluids    Intake/Output Summary (Last 24 hours) at 9/28/2019 1311  Last data filed at 9/28/2019 0900  Gross per 24 hour   Intake 1550 ml   Output 4050 ml   Net -2500 ml       Laboratory  Recent Labs     09/27/19  0845 09/28/19  0249   WBC 9.3 8.3   RBC 4.46 4.95   HEMOGLOBIN 13.5 14.8   HEMATOCRIT 42.3 46.3   MCV 94.8 93.5   MCH 30.3 29.9   MCHC 31.9* 32.0*   RDW 55.0* 54.0*   PLATELETCT 311 314   MPV 9.1 9.4     Recent Labs     09/27/19  0845 09/28/19  0249   SODIUM 141 142   POTASSIUM 3.6 3.6   CHLORIDE 106 101   CO2 27 28   GLUCOSE 93 102*   BUN 18 24*   CREATININE 0.92 1.19   CALCIUM 9.2 9.5     Recent Labs     09/27/19  0845   APTT 26.5   INR 1.02               Imaging  EC-ECHOCARDIOGRAM COMPLETE W/ CONT   Final Result      DX-CHEST-PORTABLE (1 VIEW)   Final Result      Left mid to lower lung atelectasis possible pneumonitis.           Assessment/Plan  * New onset of congestive heart failure (HCC)- (present on admission)  Assessment & Plan  -cont on  diuresis with IV Lasix 40 mg twice daily.  Close intake and output  monitoring, and daily weights.    -Echo showing EF of 20%   -cardiology consulted appreciate rec.   - possible heart cath by Monday     Elevated troponin- (present on admission)  Assessment & Plan  -Likely demand ischemia from new onset heart failure.  No EKG changes, and no complaints of chest pain.  -Trend troponins  .-Cardiology following appreciate rec.  -cardiac cath by Monday     Obesity- (present on admission)  Assessment & Plan  - Body mass index is 41.33 kg/m²..  - Counseled on weight loss.  - outpatient referral for outpatient weight management.    GERD (gastroesophageal reflux disease)- (present on admission)  Assessment & Plan  -Resume home dose Protonix.    Hypothyroidism- (present on admission)  Assessment & Plan  -Resume home dose Synthroid.  Check TSH.    Hyperlipidemia- (present on admission)  Assessment & Plan  -Resume home dose Zocor.         VTE prophylaxis: heparin

## 2019-09-28 NOTE — PROGRESS NOTES
Assumed care of pt, she is sleeping in bed. Discussed POC with NOC RN. Bed is locked in lowest position and call light/personal belongings are within reach.

## 2019-09-28 NOTE — PROGRESS NOTES
Cardiology Follow Up Progress Note    Date of Service  9/28/2019    Attending Physician  Jaci Peñaloza M.D.    Chief Complaint   SOB and ankle swelling    HPI  Ivelisse Montague is a 59 y.o. female admitted 9/27/2019 with SOB and ankle swelling over a month ago. She developed lower extremity edema.     Past medical history of hyperlipidemia, hypothyroidism, strong family history of CAD, tobacco abuse and GERD.    Interim Events  9/28/19: patient resting in bed, complaining of shortness of breath and orthopnea. She is not able to lay flat. Denies any chest pain or dizziness. Blood pressure is elevated today at sbp 150s. ST on the monitor low 100s.     Review of Systems  Review of Systems   Constitutional: Negative for chills, diaphoresis and fever.   HENT: Negative for nosebleeds and trouble swallowing.    Respiratory: Positive for shortness of breath. Negative for cough and chest tightness.    Cardiovascular: Positive for leg swelling. Negative for chest pain and palpitations.   Gastrointestinal: Negative for abdominal distention, abdominal pain and blood in stool.   Genitourinary: Negative for hematuria.   Skin: Negative for color change.   Neurological: Negative for dizziness, syncope and numbness.   Psychiatric/Behavioral: Negative for agitation and confusion. The patient is not nervous/anxious.        Vital signs in last 24 hours  Temp:  [36.1 °C (96.9 °F)-37.1 °C (98.8 °F)] 36.7 °C (98 °F)  Pulse:  [] 100  Resp:  [18-20] 18  BP: (102-152)/(62-98) 152/96  SpO2:  [91 %-98 %] 91 %    Physical Exam  Physical Exam   Constitutional: She is oriented to person, place, and time. She appears well-developed and well-nourished. No distress.   HENT:   Head: Normocephalic.   Neck: Normal range of motion. No JVD present.   Cardiovascular: Regular rhythm, normal heart sounds and intact distal pulses. Tachycardia present.   No murmur heard.  Pulmonary/Chest: Effort normal and breath sounds normal. No respiratory distress.  She has no wheezes.   Abdominal: She exhibits no distension. There is no tenderness.   Musculoskeletal: She exhibits no edema or tenderness.   Neurological: She is alert and oriented to person, place, and time.   Skin: Skin is warm and dry. No rash noted. She is not diaphoretic.   Psychiatric: Her behavior is normal. Judgment normal.   Nursing note and vitals reviewed.      Lab Review  Lab Results   Component Value Date/Time    WBC 8.3 09/28/2019 02:49 AM    RBC 4.95 09/28/2019 02:49 AM    HEMOGLOBIN 14.8 09/28/2019 02:49 AM    HEMATOCRIT 46.3 09/28/2019 02:49 AM    MCV 93.5 09/28/2019 02:49 AM    MCH 29.9 09/28/2019 02:49 AM    MCHC 32.0 (L) 09/28/2019 02:49 AM    MPV 9.4 09/28/2019 02:49 AM      Lab Results   Component Value Date/Time    SODIUM 142 09/28/2019 02:49 AM    POTASSIUM 3.6 09/28/2019 02:49 AM    CHLORIDE 101 09/28/2019 02:49 AM    CO2 28 09/28/2019 02:49 AM    GLUCOSE 102 (H) 09/28/2019 02:49 AM    BUN 24 (H) 09/28/2019 02:49 AM    CREATININE 1.19 09/28/2019 02:49 AM      Lab Results   Component Value Date/Time    ASTSGOT 25 09/27/2019 08:45 AM    ALTSGPT 30 09/27/2019 08:45 AM     Lab Results   Component Value Date/Time    TROPONINT 34 (H) 09/27/2019 10:01 PM       Recent Labs     09/27/19  0845 09/28/19  0249   NTPROBNP 6507* 4386*       Cardiac Imaging and Procedures Review  EKG:    SINUS RHYTHM   PROBABLE LEFT ATRIAL ABNORMALITY   PROLONGED QT INTERVAL   No previous ECG available for comparison     Echocardiogram:    9/27/19  No prior study is available for comparison.   Severely reduced left ventricular systolic function. Left ventricular   ejection fraction is visually estimated to be 20%.   Grade I diastolic dysfunction.  Mildly dilated right ventricle. Reduced right ventricular systolic   function.  Normal inferior vena cava size and inspiratory collapse.      Imaging  Chest X-Ray:    9/27/19  Left mid to lower lung atelectasis possible pneumonitis.       Assessment/Plan  No new Assessment &  Plan notes have been filed under this hospital service since the last note was generated.  Service: Cardiology    1. New onset of systolic congestive heart failure:  - ECHO showed ef 20%  - once evolemic, she will need ischemic workup   - continue furosemide 40mg BID IV  - strict I and O, daily stand up weight   - added lisinopril 10mg qd and add low dose metoprolol 25mg BID   - weight down 9lbs since admission     2. Hypertension:  - uncontrolled   - added ace and bb     3. PVCs:  - added bb     4. ISRRAEL:  - monitor closely   - repeat BMP at noon     Thank you for allowing me to participate in the care of this patient.  I will continue to follow this patient    Please contact me with any questions.    JEANETTE Kraft   [facility]

## 2019-09-28 NOTE — PROGRESS NOTES
Assumed care of pt, beside report received from SAUL Ellison. Updated on POC, call light within reach all fall precautions within place. Bed locked and lowered. Pt instructed to call for assistance before getting up. All questions answered, no other needs at this time.

## 2019-09-28 NOTE — CARE PLAN
Problem: Knowledge Deficit  Goal: Knowledge of disease process/condition, treatment plan, diagnostic tests, and medications will improve  Outcome: PROGRESSING AS EXPECTED     Problem: Fluid Volume:  Goal: Will maintain balanced intake and output  Outcome: PROGRESSING AS EXPECTED     Problem: Communication  Goal: The ability to communicate needs accurately and effectively will improve  Outcome: MET     Problem: Safety  Goal: Will remain free from injury  Outcome: MET  Goal: Will remain free from falls  Outcome: MET     Problem: Infection  Goal: Will remain free from infection  Outcome: MET

## 2019-09-28 NOTE — CARE PLAN
Problem: Communication  Goal: The ability to communicate needs accurately and effectively will improve  Outcome: PROGRESSING AS EXPECTED  Intervention: Xenia patient and significant other/support system to call light to alert staff of needs  Flowsheets (Taken 9/27/2019 2222)  Oriented to:: All of the Following : Location of Bathroom, Visiting Policy, Unit Routine, Call Light and Bedside Controls, Bedside Rail Policy, Smoking Policy, Rights and Responsibilities, Bedside Report, and Patient Education Notebook  Note:   Pt educated on POC and medications. Pt verbalized understanding.      Problem: Safety  Goal: Will remain free from injury  Outcome: PROGRESSING AS EXPECTED  Intervention: Provide assistance with mobility  Flowsheets (Taken 9/27/2019 2222)  Assistance: No Assistance Required  Ambulation Tolerance: Tolerates Well  Note:   Pt bedside table and call-light are within reach, bed in lowest position and locked. Treaded socks on and pt has been educated on call light use and asked to call before getting up.

## 2019-09-29 LAB
ANION GAP SERPL CALC-SCNC: 10 MMOL/L (ref 0–11.9)
BUN SERPL-MCNC: 28 MG/DL (ref 8–22)
CALCIUM SERPL-MCNC: 10 MG/DL (ref 8.5–10.5)
CHLORIDE SERPL-SCNC: 98 MMOL/L (ref 96–112)
CO2 SERPL-SCNC: 33 MMOL/L (ref 20–33)
CREAT SERPL-MCNC: 1.17 MG/DL (ref 0.5–1.4)
ERYTHROCYTE [DISTWIDTH] IN BLOOD BY AUTOMATED COUNT: 53.9 FL (ref 35.9–50)
GLUCOSE SERPL-MCNC: 102 MG/DL (ref 65–99)
HCT VFR BLD AUTO: 51.9 % (ref 37–47)
HGB BLD-MCNC: 16.1 G/DL (ref 12–16)
MCH RBC QN AUTO: 28.8 PG (ref 27–33)
MCHC RBC AUTO-ENTMCNC: 31 G/DL (ref 33.6–35)
MCV RBC AUTO: 92.7 FL (ref 81.4–97.8)
PLATELET # BLD AUTO: 324 K/UL (ref 164–446)
PMV BLD AUTO: 9.1 FL (ref 9–12.9)
POTASSIUM SERPL-SCNC: 3.7 MMOL/L (ref 3.6–5.5)
RBC # BLD AUTO: 5.6 M/UL (ref 4.2–5.4)
SODIUM SERPL-SCNC: 141 MMOL/L (ref 135–145)
WBC # BLD AUTO: 11.4 K/UL (ref 4.8–10.8)

## 2019-09-29 PROCEDURE — 99232 SBSQ HOSP IP/OBS MODERATE 35: CPT | Performed by: INTERNAL MEDICINE

## 2019-09-29 PROCEDURE — 96372 THER/PROPH/DIAG INJ SC/IM: CPT

## 2019-09-29 PROCEDURE — A9270 NON-COVERED ITEM OR SERVICE: HCPCS | Performed by: INTERNAL MEDICINE

## 2019-09-29 PROCEDURE — 85027 COMPLETE CBC AUTOMATED: CPT

## 2019-09-29 PROCEDURE — 700111 HCHG RX REV CODE 636 W/ 250 OVERRIDE (IP): Performed by: INTERNAL MEDICINE

## 2019-09-29 PROCEDURE — 700102 HCHG RX REV CODE 250 W/ 637 OVERRIDE(OP): Performed by: INTERNAL MEDICINE

## 2019-09-29 PROCEDURE — 36415 COLL VENOUS BLD VENIPUNCTURE: CPT

## 2019-09-29 PROCEDURE — 700102 HCHG RX REV CODE 250 W/ 637 OVERRIDE(OP): Performed by: NURSE PRACTITIONER

## 2019-09-29 PROCEDURE — 96376 TX/PRO/DX INJ SAME DRUG ADON: CPT

## 2019-09-29 PROCEDURE — A9270 NON-COVERED ITEM OR SERVICE: HCPCS | Performed by: NURSE PRACTITIONER

## 2019-09-29 PROCEDURE — 80048 BASIC METABOLIC PNL TOTAL CA: CPT

## 2019-09-29 PROCEDURE — 770020 HCHG ROOM/CARE - TELE (206)

## 2019-09-29 RX ORDER — SPIRONOLACTONE 25 MG/1
25 TABLET ORAL
Status: DISCONTINUED | OUTPATIENT
Start: 2019-09-29 | End: 2019-10-02 | Stop reason: HOSPADM

## 2019-09-29 RX ADMIN — FUROSEMIDE 40 MG: 10 INJECTION, SOLUTION INTRAMUSCULAR; INTRAVENOUS at 05:01

## 2019-09-29 RX ADMIN — METOPROLOL SUCCINATE 25 MG: 25 TABLET, EXTENDED RELEASE ORAL at 05:01

## 2019-09-29 RX ADMIN — ASPIRIN 81 MG 81 MG: 81 TABLET ORAL at 05:01

## 2019-09-29 RX ADMIN — HEPARIN SODIUM 5000 UNITS: 5000 INJECTION INTRAVENOUS; SUBCUTANEOUS at 12:54

## 2019-09-29 RX ADMIN — FUROSEMIDE 40 MG: 10 INJECTION, SOLUTION INTRAMUSCULAR; INTRAVENOUS at 16:46

## 2019-09-29 RX ADMIN — OMEPRAZOLE 20 MG: 20 CAPSULE, DELAYED RELEASE ORAL at 05:01

## 2019-09-29 RX ADMIN — HEPARIN SODIUM 5000 UNITS: 5000 INJECTION INTRAVENOUS; SUBCUTANEOUS at 20:36

## 2019-09-29 RX ADMIN — LEVOTHYROXINE SODIUM 200 MCG: 100 TABLET ORAL at 05:01

## 2019-09-29 RX ADMIN — LISINOPRIL 10 MG: 5 TABLET ORAL at 05:01

## 2019-09-29 RX ADMIN — SPIRONOLACTONE 25 MG: 25 TABLET ORAL at 12:55

## 2019-09-29 RX ADMIN — SIMVASTATIN 20 MG: 20 TABLET, FILM COATED ORAL at 20:36

## 2019-09-29 RX ADMIN — HEPARIN SODIUM 5000 UNITS: 5000 INJECTION INTRAVENOUS; SUBCUTANEOUS at 05:01

## 2019-09-29 ASSESSMENT — ENCOUNTER SYMPTOMS
FEVER: 0
MYALGIAS: 0
DIZZINESS: 0
EYE PAIN: 0
SINUS PAIN: 0
NAUSEA: 0
EYE DISCHARGE: 0
NECK PAIN: 0
PALPITATIONS: 0
SHORTNESS OF BREATH: 1
COUGH: 0
HEADACHES: 0
ABDOMINAL PAIN: 0
VOMITING: 0
CHILLS: 0

## 2019-09-29 NOTE — PROGRESS NOTES
"Interval History:  Feels less shortness of breath.  No chest pain dizziness.    Physical Exam   Blood pressure 135/92, pulse 92, temperature (!) 35.7 °C (96.3 °F), temperature source Temporal, resp. rate 16, height 1.575 m (5' 2\"), weight 97.4 kg (214 lb 11.7 oz), SpO2 99 %.    Constitutional:  Appears well-developed.   HENT: Normocephalic and atraumatic. No scleral icterus.   Neck: No JVD present.   Cardiovascular: Normal rate. Exam reveals no gallop and no friction rub. No murmur heard.   Pulmonary/Chest: CTAB    Abdominal: S/NT/ND BS+   Musculoskeletal: Exhibits no edema. Pulses present.  Skin: Skin is warm and dry.     ROS: As HPI other reviewed and negative       Intake/Output Summary (Last 24 hours) at 9/29/2019 1212  Last data filed at 9/29/2019 0700  Gross per 24 hour   Intake --   Output 1525 ml   Net -1525 ml       Recent Labs     09/27/19  0845 09/28/19  0249 09/29/19  0256   WBC 9.3 8.3 11.4*   RBC 4.46 4.95 5.60*   HEMOGLOBIN 13.5 14.8 16.1*   HEMATOCRIT 42.3 46.3 51.9*   MCV 94.8 93.5 92.7   MCH 30.3 29.9 28.8   MCHC 31.9* 32.0* 31.0*   RDW 55.0* 54.0* 53.9*   PLATELETCT 311 314 324   MPV 9.1 9.4 9.1     Recent Labs     09/28/19  0249 09/28/19  1223 09/29/19  0256   SODIUM 142 139 141   POTASSIUM 3.6 3.5* 3.7   CHLORIDE 101 100 98   CO2 28 27 33   GLUCOSE 102* 146* 102*   BUN 24* 22 28*   CREATININE 1.19 1.16 1.17   CALCIUM 9.5 9.7 10.0     Recent Labs     09/27/19  0845   APTT 26.5   INR 1.02                   No current facility-administered medications on file prior to encounter.      Current Outpatient Medications on File Prior to Encounter   Medication Sig Dispense Refill   • levothyroxine (SYNTHROID) 200 MCG Tab Take 200 mcg by mouth Every morning on an empty stomach.     • simvastatin (ZOCOR) 40 MG Tab Take 40 mg by mouth every evening.     • rabeprazole (ACIPHEX) 20 MG tablet Take 20 mg by mouth every day.     • methylPREDNISolone (MEDROL, ARTURO, PO) Take 1 Dose by mouth See Admin Instructions.   "       Current Facility-Administered Medications   Medication Dose Frequency Provider Last Rate Last Dose   • lisinopril (PRINIVIL) tablet 10 mg  10 mg Q DAY Raven Ortiz A.P.R.NBrie   10 mg at 09/29/19 0501   • metoprolol SR (TOPROL XL) tablet 25 mg  25 mg Q DAY Otoniel Reyes M.D.   25 mg at 09/29/19 0501   • levothyroxine (SYNTHROID) tablet 200 mcg  200 mcg AM ES Carlos Coello M.D.   200 mcg at 09/29/19 0501   • magnesium oxide (MAG-OX) tablet 400 mg  400 mg Q48HRS Carlos Coello M.D.   400 mg at 09/28/19 0827   • omeprazole (PRILOSEC) capsule 20 mg  20 mg DAILY Carlos Coello M.D.   20 mg at 09/29/19 0501   • aspirin (ASA) chewable tab 81 mg  81 mg DAILY Carlos Coello M.D.   81 mg at 09/29/19 0501   • simvastatin (ZOCOR) tablet 20 mg  20 mg Nightly Carlos Coello M.D.   20 mg at 09/28/19 2009   • Respiratory Care per Protocol   Continuous RT Carlos Coello M.D.       • acetaminophen (TYLENOL) tablet 650 mg  650 mg Q6HRS PRN Carlos Coello M.D.       • cloNIDine (CATAPRES) tablet 0.1 mg  0.1 mg Q6HRS PRN Carlos Coello M.D.       • enalaprilat (VASOTEC) injection 1.25 mg  1.25 mg Q6HRS PRN Carlos Coello M.D.       • ondansetron (ZOFRAN) syringe/vial injection 4 mg  4 mg Q4HRS PRN Carlos Coello M.D.       • ondansetron (ZOFRAN ODT) dispertab 4 mg  4 mg Q4HRS PRN Carlos Coello M.D.       • promethazine (PHENERGAN) tablet 12.5-25 mg  12.5-25 mg Q4HRS PRN Carlos Coello M.D.       • promethazine (PHENERGAN) suppository 12.5-25 mg  12.5-25 mg Q4HRS PRN Carlos Coello M.D.       • prochlorperazine (COMPAZINE) injection 5-10 mg  5-10 mg Q4HRS PRN Carlos Coello M.D.       • senna-docusate (PERICOLACE or SENOKOT S) 8.6-50 MG per tablet 2 Tab  2 Tab BID Carlos Coello M.D.        And   • polyethylene glycol/lytes (MIRALAX) PACKET 1 Packet  1 Packet QDAY PRN Carlos Coello M.D.        And   • magnesium hydroxide (MILK OF MAGNESIA) suspension 30 mL  30 mL QDAY PRN Carlos Coello M.D.        And   •  bisacodyl (DULCOLAX) suppository 10 mg  10 mg QDAY PRN Carlos Coello M.D.       • heparin injection 5,000 Units  5,000 Units Q8HRS Carlos Coello M.D.   5,000 Units at 09/29/19 0501   • furosemide (LASIX) injection 40 mg  40 mg BID DIURETIC Carlos Coello M.D.   40 mg at 09/29/19 0501     Last reviewed on 9/27/2019 10:22 AM by Darian Connor  Medications reviewed    Imaging reviewed    ECHO(9/27/2019):  CONCLUSIONS  No prior study is available for comparison.   Severely reduced left ventricular systolic function. Left ventricular   ejection fraction is visually estimated to be 20%.   Grade I diastolic dysfunction.  Mildly dilated right ventricle. Reduced right ventricular systolic   function.  Normal inferior vena cava size and inspiratory collapse.    Impressions:  59-year-old female patient with  1.  New diagnosis of systolic heart failure, cardiomyopathy, NYHA class III stage C  2.  Hypothyroidism, TSH was slightly on higher side  3.  Hyperlipidemia  4.  GERD    Recommendations:  Clinically improving with diuresis, able to lie flat today.  Continue diuresis today.  I suspect nonischemic cardiomyopathy, will schedule coronary angiogram tomorrow.  Blood pressure is better this morning.  Continue lisinopril, metoprolol succinate.  I will add Aldactone 25 mg daily.    Discussed with primary physician and bedside nursing.    Do not hesitate to call me with questions regarding the patient care.    Otoniel Reyes  Interventional cardiologist  Cell: [phone]

## 2019-09-29 NOTE — PROGRESS NOTES
Assumed care of pt, beside report received from SAUL Montiel. Updated on POC, call light within reach all fall precautions within place. Bed locked and lowered. Pt instructed to call for assistance before getting up. All questions answered, no other needs at this time.

## 2019-09-29 NOTE — PROGRESS NOTES
[facility] Medicine Daily Progress Note    Date of Service  9/29/2019    Chief Complaint  59 y.o. female admitted 9/27/2019 with shortness of breath    Hospital Course    This is a 58 y/o F with obesity, GERD, hypothyroidism, and hyperlipidemia, nondiabetic, with no prior cardiac history, who was admitted on 9/27/19 after presenting to ER complaining of shortness of breath and leg edema  in the last 2 to 3 months, which was significantly worse in the last 1 week. Pt has been found to have new diagnosis of systolic heart failure, and cardiology has been consulted.        Interval Problem Update  No acute events overnight ,SOB slowly improving, afebrile, no CP, nausea or vomiting  Cardiology following appreciate rec.  Plan for cardiac cath on Monday      Consultants/Specialty  Cardiology    Code Status  Full code     Disposition  TBD     Review of Systems  Review of Systems   Constitutional: Negative for chills and fever.   HENT: Negative for ear pain and sinus pain.    Eyes: Negative for pain and discharge.   Respiratory: Positive for shortness of breath. Negative for cough.    Cardiovascular: Negative for chest pain and palpitations.   Gastrointestinal: Negative for abdominal pain, nausea and vomiting.   Genitourinary: Negative for dysuria and urgency.   Musculoskeletal: Negative for myalgias and neck pain.   Neurological: Negative for dizziness and headaches.   All other systems reviewed and are negative.       Physical Exam  Temp:  [35.7 °C (96.3 °F)-37.1 °C (98.8 °F)] 35.7 °C (96.3 °F)  Pulse:  [] 92  Resp:  [14-20] 16  BP: (100-135)/(68-92) 135/92  SpO2:  [93 %-99 %] 99 %    Physical Exam   Constitutional: She is oriented to person, place, and time. She appears well-developed. No distress.   Morbidly obese. Body mass index is 41.33 kg/m².   HENT:   Head: Normocephalic and atraumatic.   Mouth/Throat: Oropharynx is clear and moist. No oropharyngeal exudate.   Eyes: Pupils are equal, round, and reactive to  light. Conjunctivae are normal. No scleral icterus.   Neck: Normal range of motion. Neck supple. JVD present.   Cardiovascular: Normal rate and regular rhythm. Exam reveals no gallop and no friction rub.   No murmur heard.  Pulmonary/Chest: Effort normal. No respiratory distress. She has no rales.   Diminished air entry B/L bases   Abdominal: Soft. Bowel sounds are normal. She exhibits no distension. There is no tenderness.   Musculoskeletal: Normal range of motion. She exhibits edema ( 2-3+ B/L LE edema). She exhibits no tenderness.   Lymphadenopathy:     She has no cervical adenopathy.   Neurological: She is alert and oriented to person, place, and time. No cranial nerve deficit.   Skin: Skin is warm and dry. She is not diaphoretic. No erythema.   Psychiatric: She has a normal mood and affect. Her behavior is normal. Judgment and thought content normal.   Nursing note and vitals reviewed.      Fluids    Intake/Output Summary (Last 24 hours) at 9/29/2019 1052  Last data filed at 9/29/2019 0700  Gross per 24 hour   Intake --   Output 1525 ml   Net -1525 ml       Laboratory  Recent Labs     09/27/19  0845 09/28/19  0249 09/29/19  0256   WBC 9.3 8.3 11.4*   RBC 4.46 4.95 5.60*   HEMOGLOBIN 13.5 14.8 16.1*   HEMATOCRIT 42.3 46.3 51.9*   MCV 94.8 93.5 92.7   MCH 30.3 29.9 28.8   MCHC 31.9* 32.0* 31.0*   RDW 55.0* 54.0* 53.9*   PLATELETCT 311 314 324   MPV 9.1 9.4 9.1     Recent Labs     09/28/19  0249 09/28/19  1223 09/29/19  0256   SODIUM 142 139 141   POTASSIUM 3.6 3.5* 3.7   CHLORIDE 101 100 98   CO2 28 27 33   GLUCOSE 102* 146* 102*   BUN 24* 22 28*   CREATININE 1.19 1.16 1.17   CALCIUM 9.5 9.7 10.0     Recent Labs     09/27/19  0845   APTT 26.5   INR 1.02               Imaging  EC-ECHOCARDIOGRAM COMPLETE W/ CONT   Final Result      DX-CHEST-PORTABLE (1 VIEW)   Final Result      Left mid to lower lung atelectasis possible pneumonitis.           Assessment/Plan  * New onset of congestive heart failure (HCC)- (present  on admission)  Assessment & Plan  -cont on  diuresis with IV Lasix 40 mg twice daily.  Close intake and output monitoring, and daily weights.    -Echo showing EF of 20%   -cardiology consulted appreciate rec.   - possible heart cath by Monday     Elevated troponin- (present on admission)  Assessment & Plan  -Likely demand ischemia from new onset heart failure.  No EKG changes, and no complaints of chest pain.  -Trend troponins  .-Cardiology following appreciate rec.  -cardiac cath by Monday     Obesity- (present on admission)  Assessment & Plan  - Body mass index is 41.33 kg/m²..  - Counseled on weight loss.  - outpatient referral for outpatient weight management.    GERD (gastroesophageal reflux disease)- (present on admission)  Assessment & Plan  -Resume home dose Protonix.    Hypothyroidism- (present on admission)  Assessment & Plan  -Resume home dose Synthroid.  Check TSH.    Hyperlipidemia- (present on admission)  Assessment & Plan  -Resume home dose Zocor.       VTE prophylaxis: heparin

## 2019-09-29 NOTE — PROGRESS NOTES
Bedside report received from previous nurse regarding prior 12 hours.  Pt up to the bathroom.  Denies pain.  White board updated.  Call light within reach.

## 2019-09-29 NOTE — CARE PLAN
Problem: Knowledge Deficit  Goal: Knowledge of disease process/condition, treatment plan, diagnostic tests, and medications will improve  Outcome: PROGRESSING AS EXPECTED  Intervention: Assess knowledge level of disease process/condition, treatment plan, diagnostic tests, and medications  Note:   Verbal education provided to pt about disease process/condition and corresponding treatment, all questions and concerns have been addressed at this time.      Problem: Respiratory:  Goal: Respiratory status will improve  Outcome: PROGRESSING AS EXPECTED  Intervention: Assess and monitor pulmonary status  Flowsheets  Taken 9/28/2019 1616 by Filipe Milligan, C.N.A.  Resp: 20  SpO2: 96 %  O2 (LPM): 3  Taken 9/28/2019 0805 by Tiana Carrion RTAL  Work Of Breathing / Effort: Mild  RUL Breath Sounds: Diminished  RML Breath Sounds: Diminished  RLL Breath Sounds: Diminished  HERBERTH Breath Sounds: Diminished  LLL Breath Sounds: Diminished

## 2019-09-29 NOTE — CARE PLAN
Problem: Knowledge Deficit  Goal: Knowledge of disease process/condition, treatment plan, diagnostic tests, and medications will improve  Outcome: PROGRESSING AS EXPECTED  Note:   Pt educated regarding plan of care and medications. All questions answered.

## 2019-09-30 LAB
ANION GAP SERPL CALC-SCNC: 12 MMOL/L (ref 0–11.9)
APTT PPP: 25.6 SEC (ref 24.7–36)
BUN SERPL-MCNC: 32 MG/DL (ref 8–22)
CALCIUM SERPL-MCNC: 9.7 MG/DL (ref 8.5–10.5)
CHLORIDE SERPL-SCNC: 99 MMOL/L (ref 96–112)
CO2 SERPL-SCNC: 27 MMOL/L (ref 20–33)
CREAT SERPL-MCNC: 0.94 MG/DL (ref 0.5–1.4)
ERYTHROCYTE [DISTWIDTH] IN BLOOD BY AUTOMATED COUNT: 53.1 FL (ref 35.9–50)
ERYTHROCYTE [DISTWIDTH] IN BLOOD BY AUTOMATED COUNT: 53.8 FL (ref 35.9–50)
GLUCOSE SERPL-MCNC: 122 MG/DL (ref 65–99)
HCT VFR BLD AUTO: 53.1 % (ref 37–47)
HCT VFR BLD AUTO: 57.7 % (ref 37–47)
HGB BLD-MCNC: 17 G/DL (ref 12–16)
HGB BLD-MCNC: 18.9 G/DL (ref 12–16)
INR PPP: 0.93 (ref 0.87–1.13)
MAGNESIUM SERPL-MCNC: 2.3 MG/DL (ref 1.5–2.5)
MCH RBC QN AUTO: 29.3 PG (ref 27–33)
MCH RBC QN AUTO: 30.6 PG (ref 27–33)
MCHC RBC AUTO-ENTMCNC: 32 G/DL (ref 33.6–35)
MCHC RBC AUTO-ENTMCNC: 32.8 G/DL (ref 33.6–35)
MCV RBC AUTO: 91.6 FL (ref 81.4–97.8)
MCV RBC AUTO: 93.5 FL (ref 81.4–97.8)
PLATELET # BLD AUTO: 330 K/UL (ref 164–446)
PLATELET # BLD AUTO: 334 K/UL (ref 164–446)
PMV BLD AUTO: 8.8 FL (ref 9–12.9)
PMV BLD AUTO: 9.2 FL (ref 9–12.9)
POTASSIUM SERPL-SCNC: 3.6 MMOL/L (ref 3.6–5.5)
PROTHROMBIN TIME: 12.7 SEC (ref 12–14.6)
RBC # BLD AUTO: 5.8 M/UL (ref 4.2–5.4)
RBC # BLD AUTO: 6.17 M/UL (ref 4.2–5.4)
SODIUM SERPL-SCNC: 138 MMOL/L (ref 135–145)
WBC # BLD AUTO: 10.2 K/UL (ref 4.8–10.8)
WBC # BLD AUTO: 9.2 K/UL (ref 4.8–10.8)

## 2019-09-30 PROCEDURE — A9270 NON-COVERED ITEM OR SERVICE: HCPCS | Performed by: INTERNAL MEDICINE

## 2019-09-30 PROCEDURE — 83735 ASSAY OF MAGNESIUM: CPT

## 2019-09-30 PROCEDURE — 700102 HCHG RX REV CODE 250 W/ 637 OVERRIDE(OP): Performed by: INTERNAL MEDICINE

## 2019-09-30 PROCEDURE — A9270 NON-COVERED ITEM OR SERVICE: HCPCS | Performed by: NURSE PRACTITIONER

## 2019-09-30 PROCEDURE — 85730 THROMBOPLASTIN TIME PARTIAL: CPT

## 2019-09-30 PROCEDURE — 85027 COMPLETE CBC AUTOMATED: CPT

## 2019-09-30 PROCEDURE — 99232 SBSQ HOSP IP/OBS MODERATE 35: CPT | Performed by: INTERNAL MEDICINE

## 2019-09-30 PROCEDURE — 80048 BASIC METABOLIC PNL TOTAL CA: CPT

## 2019-09-30 PROCEDURE — 700102 HCHG RX REV CODE 250 W/ 637 OVERRIDE(OP): Performed by: NURSE PRACTITIONER

## 2019-09-30 PROCEDURE — 700111 HCHG RX REV CODE 636 W/ 250 OVERRIDE (IP): Performed by: INTERNAL MEDICINE

## 2019-09-30 PROCEDURE — 36415 COLL VENOUS BLD VENIPUNCTURE: CPT

## 2019-09-30 PROCEDURE — 85610 PROTHROMBIN TIME: CPT

## 2019-09-30 PROCEDURE — 770020 HCHG ROOM/CARE - TELE (206)

## 2019-09-30 RX ORDER — POTASSIUM CHLORIDE 20 MEQ/1
20 TABLET, EXTENDED RELEASE ORAL ONCE
Status: COMPLETED | OUTPATIENT
Start: 2019-09-30 | End: 2019-09-30

## 2019-09-30 RX ORDER — SODIUM CHLORIDE 9 MG/ML
INJECTION, SOLUTION INTRAVENOUS CONTINUOUS
Status: DISCONTINUED | OUTPATIENT
Start: 2019-10-01 | End: 2019-10-02

## 2019-09-30 RX ADMIN — SIMVASTATIN 20 MG: 20 TABLET, FILM COATED ORAL at 21:24

## 2019-09-30 RX ADMIN — POTASSIUM CHLORIDE 20 MEQ: 20 TABLET, EXTENDED RELEASE ORAL at 11:05

## 2019-09-30 RX ADMIN — MAGNESIUM GLUCONATE 500 MG ORAL TABLET 400 MG: 500 TABLET ORAL at 10:01

## 2019-09-30 RX ADMIN — SPIRONOLACTONE 25 MG: 25 TABLET ORAL at 04:53

## 2019-09-30 RX ADMIN — METOPROLOL SUCCINATE 25 MG: 25 TABLET, EXTENDED RELEASE ORAL at 04:53

## 2019-09-30 RX ADMIN — HEPARIN SODIUM 5000 UNITS: 5000 INJECTION INTRAVENOUS; SUBCUTANEOUS at 15:30

## 2019-09-30 RX ADMIN — OMEPRAZOLE 20 MG: 20 CAPSULE, DELAYED RELEASE ORAL at 04:53

## 2019-09-30 RX ADMIN — HEPARIN SODIUM 5000 UNITS: 5000 INJECTION INTRAVENOUS; SUBCUTANEOUS at 21:24

## 2019-09-30 RX ADMIN — SENNOSIDES, DOCUSATE SODIUM 2 TABLET: 50; 8.6 TABLET, FILM COATED ORAL at 04:53

## 2019-09-30 RX ADMIN — FUROSEMIDE 40 MG: 10 INJECTION, SOLUTION INTRAMUSCULAR; INTRAVENOUS at 18:35

## 2019-09-30 RX ADMIN — LISINOPRIL 10 MG: 5 TABLET ORAL at 04:53

## 2019-09-30 RX ADMIN — FUROSEMIDE 40 MG: 10 INJECTION, SOLUTION INTRAMUSCULAR; INTRAVENOUS at 04:53

## 2019-09-30 RX ADMIN — LEVOTHYROXINE SODIUM 200 MCG: 100 TABLET ORAL at 04:53

## 2019-09-30 RX ADMIN — ASPIRIN 81 MG 81 MG: 81 TABLET ORAL at 04:53

## 2019-09-30 ASSESSMENT — ENCOUNTER SYMPTOMS
SHORTNESS OF BREATH: 1
ABDOMINAL DISTENTION: 0
DIZZINESS: 0
BLOOD IN STOOL: 0
TROUBLE SWALLOWING: 0
EYE DISCHARGE: 0
SINUS PAIN: 0
MYALGIAS: 0
ABDOMINAL PAIN: 0
CONFUSION: 0
PALPITATIONS: 0
NERVOUS/ANXIOUS: 0
AGITATION: 0
NECK PAIN: 0
EYE PAIN: 0
DIAPHORESIS: 0
COUGH: 0
FEVER: 0
HEADACHES: 0
NUMBNESS: 0
COLOR CHANGE: 0
CHEST TIGHTNESS: 0
NAUSEA: 0
CHILLS: 0
VOMITING: 0

## 2019-09-30 ASSESSMENT — PATIENT HEALTH QUESTIONNAIRE - PHQ9
1. LITTLE INTEREST OR PLEASURE IN DOING THINGS: NOT AT ALL
2. FEELING DOWN, DEPRESSED, IRRITABLE, OR HOPELESS: NOT AT ALL
SUM OF ALL RESPONSES TO PHQ9 QUESTIONS 1 AND 2: 0

## 2019-09-30 NOTE — HEART FAILURE PROGRAM
Cardiovascular Nurse Navigator () Advanced Heart Failure Program Inpatient Consult Note:     Patient presented on 9/27/19 with SOB and LE edema both progressively worsening over past 2-3 months per H&P.    EF was found to be 20%. Cardiology is consulted, patient to have angiogram tomorrow. She is obese, has a strong family hx of CAD, she uses tobacco, and HLD. It's not noted in her history but she has been hypertensive with systolics in the 150's.     · HFrEF (20%)  · NYHA: not being noted by providers, at least III if not IV upon admission  · Etiology: risk factors listed above, no ischemic eval yet  · Consider for CardioMEMS commercial or GUIDE HF? No, new dx still needs angio  · Diuresis: still receiving IV furosemide 40mg bid    Demographics:    · Insurance: [employer]  · [place] [facility] [place] Prevention interventions:    · Pneumococcal vaccine: PNA vaccine assessment has been removed from the nursing flowsheets. Please ask MD to assess this patient for the PNA vaccine and order if appropriate. HF is a high risk condition regardless of age.  · Influenza vaccine:     HFrEF GDMT:    · VANI - I: lisinopril  · Evidence Based BB (bisoprolol, carvedilol, or Toprol XL): Toprol xl  · Aldosterone receptor antagonist: spironolactone   · AC for AF: n/a per ECG      Device Therapy Screening Tool     Not appropriate in de cesar diagnosis      Daily Weights: ordered    Please teach patient to weigh daily and write on symptom tracker.    Please assess patient's understanding of what to do if weight is out of range.    If pt does not own a working scale and cannot afford to purchase one, please provide one. Scales can be found in the Care Coordination Rooms on T-7&T-8.    I's and O's: ordered    If we are not tracking intake and output, we don't know if diuretics are working.    This also increases the risk that the patient will be sent home without enough fluid off.    [facility] Plan Notes: none    Therapy Notes:  "none    Advanced Care Planning:  No AD on file. Full code status at the time of this note's filing.    The ACC recommends engaging palliative care as part of optimization of HFrEF treatment to solicit goals of care and focus on quality of life throughout the clinical course of HF.    Follow up appointment:   If discharged from acute care to home (exception hospice discharge), pt must have an appointment scheduled within 7 days of discharge (Cardiology, PCP, or DC Clinic).    If discharged to Transitional Care Facility (LTAC, SNF, IRH), appointment should be made about a month out for after TCF.     Bedside Nursing Education:  Please provide HF booklet and repeated, ongoing education while administering medications, weighing patient, discussing management of symptoms, diet and need to follow up and act on changes.    Bedside Nursing Discharge:  When completing the after visit summary (discharge instructions) please select \"Cardiac Diagnosis, and Heart Failure\" in the special instructions section to populate the heart failure specific discharge instructions.     Referrals/Orders Placed:    Hospital Schedulers for HF f/u?  yes  Social Work   yes  Registered Dietician  yes  [facility] CP Program for patients with Medicaid, [facility], or longterm Plus coverage?  no    Terra WARD RN, CHFN ext. [phone] M-F        "

## 2019-09-30 NOTE — CARE PLAN
Problem: Knowledge Deficit  Goal: Knowledge of disease process/condition, treatment plan, diagnostic tests, and medications will improve  Outcome: PROGRESSING AS EXPECTED  Note:   Patient educated on POC and medications. Patient encouraged to voice questions or concerns.      Problem: Pain Management  Goal: Pain level will decrease to patient's comfort goal  Outcome: PROGRESSING AS EXPECTED  Note:   Patient has no current complaints of pain. Patient educated on pain control, patient educated to inform RN of pain.

## 2019-09-30 NOTE — PROGRESS NOTES
[facility] Medicine Daily Progress Note    Date of Service  9/30/2019    Chief Complaint  59 y.o. female admitted 9/27/2019 with shortness of breath    Hospital Course    This is a 60 y/o F with obesity, GERD, hypothyroidism, and hyperlipidemia, nondiabetic, with no prior cardiac history, who was admitted on 9/27/19 after presenting to ER complaining of shortness of breath and leg edema  in the last 2 to 3 months, which was significantly worse in the last 1 week. Pt has been found to have new diagnosis of systolic heart failure, and cardiology has been consulted.        Interval Problem Update  Pt seen and examined SOB slowly improving, afebrile, no CP, nausea or vomiting  Cardiology following appreciate rec.  Plan for cardiac cath tomorrow     Consultants/Specialty  Cardiology    Code Status  Full code     Disposition  TBD     Review of Systems  Review of Systems   Constitutional: Negative for chills and fever.   HENT: Negative for ear pain and sinus pain.    Eyes: Negative for pain and discharge.   Respiratory: Positive for shortness of breath. Negative for cough.    Cardiovascular: Negative for chest pain and palpitations.   Gastrointestinal: Negative for abdominal pain, nausea and vomiting.   Genitourinary: Negative for dysuria and urgency.   Musculoskeletal: Negative for myalgias and neck pain.   Neurological: Negative for dizziness and headaches.   All other systems reviewed and are negative.       Physical Exam  Temp:  [35.6 °C (96 °F)-36.9 °C (98.4 °F)] 36 °C (96.8 °F)  Pulse:  [49-97] 77  Resp:  [16] 16  BP: ()/(55-86) 92/55  SpO2:  [95 %-97 %] 96 %    Physical Exam   Constitutional: She is oriented to person, place, and time. She appears well-developed. No distress.   Morbidly obese. Body mass index is 41.33 kg/m².   HENT:   Head: Normocephalic and atraumatic.   Mouth/Throat: Oropharynx is clear and moist. No oropharyngeal exudate.   Eyes: Pupils are equal, round, and reactive to light. Conjunctivae  are normal. No scleral icterus.   Neck: Normal range of motion. Neck supple. JVD present.   Cardiovascular: Normal rate and regular rhythm. Exam reveals no gallop and no friction rub.   No murmur heard.  Pulmonary/Chest: Effort normal. No respiratory distress. She has no rales.   Diminished air entry B/L bases   Abdominal: Soft. Bowel sounds are normal. She exhibits no distension. There is no tenderness.   Musculoskeletal: Normal range of motion. She exhibits edema ( 2-3+ B/L LE edema). She exhibits no tenderness.   Lymphadenopathy:     She has no cervical adenopathy.   Neurological: She is alert and oriented to person, place, and time. No cranial nerve deficit.   Skin: Skin is warm and dry. She is not diaphoretic. No erythema.   Psychiatric: She has a normal mood and affect. Her behavior is normal. Judgment and thought content normal.   Nursing note and vitals reviewed.      Fluids    Intake/Output Summary (Last 24 hours) at 9/30/2019 1140  Last data filed at 9/29/2019 1600  Gross per 24 hour   Intake --   Output 50 ml   Net -50 ml       Laboratory  Recent Labs     09/29/19  0256 09/30/19  0245 09/30/19  1119   WBC 11.4* 10.2 9.2   RBC 5.60* 5.80* 6.17*   HEMOGLOBIN 16.1* 17.0* 18.9*   HEMATOCRIT 51.9* 53.1* 57.7*   MCV 92.7 91.6 93.5   MCH 28.8 29.3 30.6   MCHC 31.0* 32.0* 32.8*   RDW 53.9* 53.1* 53.8*   PLATELETCT 324 330 334   MPV 9.1 8.8* 9.2     Recent Labs     09/28/19  1223 09/29/19  0256 09/30/19  0245   SODIUM 139 141 138   POTASSIUM 3.5* 3.7 3.6   CHLORIDE 100 98 99   CO2 27 33 27   GLUCOSE 146* 102* 122*   BUN 22 28* 32*   CREATININE 1.16 1.17 0.94   CALCIUM 9.7 10.0 9.7                   Imaging  EC-ECHOCARDIOGRAM COMPLETE W/ CONT   Final Result      DX-CHEST-PORTABLE (1 VIEW)   Final Result      Left mid to lower lung atelectasis possible pneumonitis.           Assessment/Plan  * New onset of congestive heart failure (HCC)- (present on admission)  Assessment & Plan  -cont on  diuresis with IV Lasix 40  mg twice daily.  Close intake and output monitoring, and daily weights.    -Echo showing EF of 20%   -cardiology consulted appreciate rec.   - plan for cardiac cath tomorrow     Elevated troponin- (present on admission)  Assessment & Plan  -Likely demand ischemia from new onset heart failure.  No EKG changes, and no complaints of chest pain.  -Trend troponins  .-Cardiology following appreciate rec.  -cardiac cath by Monday     Obesity- (present on admission)  Assessment & Plan  - Body mass index is 41.33 kg/m²..  - Counseled on weight loss.  - outpatient referral for outpatient weight management.    GERD (gastroesophageal reflux disease)- (present on admission)  Assessment & Plan  -Resume home dose Protonix.    Hypothyroidism- (present on admission)  Assessment & Plan  -Resume home dose Synthroid.  Check TSH.    Hyperlipidemia- (present on admission)  Assessment & Plan  -Resume home dose Zocor.       VTE prophylaxis: heparin

## 2019-09-30 NOTE — PROGRESS NOTES
Monitor Summary:    Rhythm: SR-ST  Rate:   Ectopy: rare-occasional PACs, occasional-frequent PVCs, rare coup, trip, bigem, trigem  Intervals: .12/.08/.34

## 2019-09-30 NOTE — CARE PLAN
Problem: Knowledge Deficit  Goal: Knowledge of disease process/condition, treatment plan, diagnostic tests, and medications will improve  Outcome: PROGRESSING AS EXPECTED  Note:   Patient updated on plan of care. All medications discussed with patient prior to administration. Patient verbalizes understanding, no questions at this time.        Problem: Fluid Volume:  Goal: Will maintain balanced intake and output  Outcome: PROGRESSING AS EXPECTED  Note:   IV lasix given for CHF. Monitoring I&Os. Daily weights done. Monitoring lower extremity edema.

## 2019-09-30 NOTE — PROGRESS NOTES
Report received from Sherri HUGHES, patient care assumed. Patient is awake, alert and oriented x4. Room air, visible chest rise/fall. Denies pain, no s/s of distress. Tele monitor on. Bed low, locked position. Call light in reach.     Initial assessment completed. Updated on plan of care. No questions at this time.

## 2019-09-30 NOTE — PROGRESS NOTES
Cardiology Follow Up Progress Note    Date of Service  9/30/2019    Attending Physician  Jaci Peñaloza M.D.    Chief Complaint   SOB and ankle swelling    HPI  Ivelisse Montague is a 59 y.o. female admitted 9/27/2019 with SOB and ankle swelling over a month ago. She developed lower extremity edema.     Past medical history of hyperlipidemia, hypothyroidism, strong family history of CAD, tobacco abuse and GERD.    Interim Events  9/30/19: patient resting in bed, lying flat. Denies any chest pain or SOB. On 2L of oxygen. Had 12bts vtach, asymptomatic. Weight down 21lbs since admission     9/28/19: patient resting in bed, complaining of shortness of breath and orthopnea. She is not able to lay flat. Denies any chest pain or dizziness. Blood pressure is elevated today at sbp 150s. ST on the monitor low 100s.     Review of Systems  Review of Systems   Constitutional: Negative for chills, diaphoresis and fever.   HENT: Negative for nosebleeds and trouble swallowing.    Respiratory: Positive for shortness of breath. Negative for cough and chest tightness.    Cardiovascular: Positive for leg swelling. Negative for chest pain and palpitations.   Gastrointestinal: Negative for abdominal distention, abdominal pain and blood in stool.   Genitourinary: Negative for hematuria.   Skin: Negative for color change.   Neurological: Negative for dizziness, syncope and numbness.   Psychiatric/Behavioral: Negative for agitation and confusion. The patient is not nervous/anxious.        Vital signs in last 24 hours  Temp:  [35.6 °C (96 °F)-36.9 °C (98.4 °F)] 36 °C (96.8 °F)  Pulse:  [49-97] 77  Resp:  [16] 16  BP: ()/(55-86) 92/55  SpO2:  [95 %-97 %] 96 %    Physical Exam  Physical Exam   Constitutional: She is oriented to person, place, and time. She appears well-developed and well-nourished. No distress.   HENT:   Head: Normocephalic.   Neck: Normal range of motion. No JVD present.   Cardiovascular: Regular rhythm, normal heart  sounds and intact distal pulses. Tachycardia present.   No murmur heard.  Pulmonary/Chest: Effort normal and breath sounds normal. No respiratory distress. She has no wheezes.   Abdominal: She exhibits no distension. There is no tenderness.   Musculoskeletal: She exhibits edema. She exhibits no tenderness.   Neurological: She is alert and oriented to person, place, and time.   Skin: Skin is warm and dry. No rash noted. She is not diaphoretic.   Psychiatric: Her behavior is normal. Judgment normal.   Nursing note and vitals reviewed.      Lab Review  Lab Results   Component Value Date/Time    WBC 10.2 09/30/2019 02:45 AM    RBC 5.80 (H) 09/30/2019 02:45 AM    HEMOGLOBIN 17.0 (H) 09/30/2019 02:45 AM    HEMATOCRIT 53.1 (H) 09/30/2019 02:45 AM    MCV 91.6 09/30/2019 02:45 AM    MCH 29.3 09/30/2019 02:45 AM    MCHC 32.0 (L) 09/30/2019 02:45 AM    MPV 8.8 (L) 09/30/2019 02:45 AM      Lab Results   Component Value Date/Time    SODIUM 138 09/30/2019 02:45 AM    POTASSIUM 3.6 09/30/2019 02:45 AM    CHLORIDE 99 09/30/2019 02:45 AM    CO2 27 09/30/2019 02:45 AM    GLUCOSE 122 (H) 09/30/2019 02:45 AM    BUN 32 (H) 09/30/2019 02:45 AM    CREATININE 0.94 09/30/2019 02:45 AM      Lab Results   Component Value Date/Time    ASTSGOT 25 09/27/2019 08:45 AM    ALTSGPT 30 09/27/2019 08:45 AM     Lab Results   Component Value Date/Time    TROPONINT 34 (H) 09/27/2019 10:01 PM       Recent Labs     09/28/19  0249   NTPROBNP 4386*       Cardiac Imaging and Procedures Review  EKG:    SINUS RHYTHM   PROBABLE LEFT ATRIAL ABNORMALITY   PROLONGED QT INTERVAL   No previous ECG available for comparison     Echocardiogram:    9/27/19  No prior study is available for comparison.   Severely reduced left ventricular systolic function. Left ventricular   ejection fraction is visually estimated to be 20%.   Grade I diastolic dysfunction.  Mildly dilated right ventricle. Reduced right ventricular systolic   function.  Normal inferior vena cava size and  inspiratory collapse.      Imaging  Chest X-Ray:    9/27/19  Left mid to lower lung atelectasis possible pneumonitis.       Assessment/Plan  No new Assessment & Plan notes have been filed under this hospital service since the last note was generated.  Service: Cardiology    1. New onset of systolic congestive heart failure:  - ECHO showed ef 20%  - once evolemic, cardiac catheterization tomorrow, please keep NPO at midnight   - continue furosemide 40mg BID IV  - strict I and O, daily stand up weight   - continue lisinopril 10mg qd and  metoprolol 25mg BID (need to switch to XL prior to discharge)  - weight down 21lbs since admission     2. Hypertension:  - stable   - continue ace and bb     3. Nonsustained vt:  - goal K 3.6 (replacement with 20meq potassium one time) and check mag    4. ISRRAEL:  - improved creatinine 0.94  - cardiorenal     Thank you for allowing me to participate in the care of this patient.  I will continue to follow this patient    Please contact me with any questions.    JEANETTE Kraft   [facility]

## 2019-09-30 NOTE — PROGRESS NOTES
Patient had 12 beats VTach. Asymptomatic - patient sleeping at time. VSS. Labs reviewed.     Cardiology on call paged at [phone]. Dr House notified - no new orders.

## 2019-10-01 LAB
ALBUMIN SERPL BCP-MCNC: 4.4 G/DL (ref 3.2–4.9)
ALBUMIN/GLOB SERPL: 1.5 G/DL
ALP SERPL-CCNC: 60 U/L (ref 30–99)
ALT SERPL-CCNC: 23 U/L (ref 2–50)
ANION GAP SERPL CALC-SCNC: 12 MMOL/L (ref 0–11.9)
AST SERPL-CCNC: 24 U/L (ref 12–45)
BILIRUB SERPL-MCNC: 0.9 MG/DL (ref 0.1–1.5)
BUN SERPL-MCNC: 40 MG/DL (ref 8–22)
CALCIUM SERPL-MCNC: 9.6 MG/DL (ref 8.5–10.5)
CHLORIDE SERPL-SCNC: 98 MMOL/L (ref 96–112)
CO2 SERPL-SCNC: 27 MMOL/L (ref 20–33)
CREAT SERPL-MCNC: 1.11 MG/DL (ref 0.5–1.4)
ERYTHROCYTE [DISTWIDTH] IN BLOOD BY AUTOMATED COUNT: 53.5 FL (ref 35.9–50)
GLOBULIN SER CALC-MCNC: 3 G/DL (ref 1.9–3.5)
GLUCOSE SERPL-MCNC: 113 MG/DL (ref 65–99)
HCT VFR BLD AUTO: 53.2 % (ref 37–47)
HGB BLD-MCNC: 17 G/DL (ref 12–16)
MCH RBC QN AUTO: 29.4 PG (ref 27–33)
MCHC RBC AUTO-ENTMCNC: 32 G/DL (ref 33.6–35)
MCV RBC AUTO: 91.9 FL (ref 81.4–97.8)
PLATELET # BLD AUTO: 357 K/UL (ref 164–446)
PMV BLD AUTO: 9.1 FL (ref 9–12.9)
POTASSIUM SERPL-SCNC: 4.2 MMOL/L (ref 3.6–5.5)
PROT SERPL-MCNC: 7.4 G/DL (ref 6–8.2)
RBC # BLD AUTO: 5.79 M/UL (ref 4.2–5.4)
SODIUM SERPL-SCNC: 137 MMOL/L (ref 135–145)
WBC # BLD AUTO: 10 K/UL (ref 4.8–10.8)

## 2019-10-01 PROCEDURE — 99152 MOD SED SAME PHYS/QHP 5/>YRS: CPT | Performed by: INTERNAL MEDICINE

## 2019-10-01 PROCEDURE — B2111ZZ FLUOROSCOPY OF MULTIPLE CORONARY ARTERIES USING LOW OSMOLAR CONTRAST: ICD-10-PCS | Performed by: INTERNAL MEDICINE

## 2019-10-01 PROCEDURE — 700117 HCHG RX CONTRAST REV CODE 255: Performed by: INTERNAL MEDICINE

## 2019-10-01 PROCEDURE — 770020 HCHG ROOM/CARE - TELE (206)

## 2019-10-01 PROCEDURE — A9270 NON-COVERED ITEM OR SERVICE: HCPCS | Performed by: INTERNAL MEDICINE

## 2019-10-01 PROCEDURE — 99153 MOD SED SAME PHYS/QHP EA: CPT

## 2019-10-01 PROCEDURE — 700102 HCHG RX REV CODE 250 W/ 637 OVERRIDE(OP): Performed by: NURSE PRACTITIONER

## 2019-10-01 PROCEDURE — 36415 COLL VENOUS BLD VENIPUNCTURE: CPT

## 2019-10-01 PROCEDURE — 700101 HCHG RX REV CODE 250

## 2019-10-01 PROCEDURE — 700102 HCHG RX REV CODE 250 W/ 637 OVERRIDE(OP): Performed by: INTERNAL MEDICINE

## 2019-10-01 PROCEDURE — 93458 L HRT ARTERY/VENTRICLE ANGIO: CPT | Mod: 26 | Performed by: INTERNAL MEDICINE

## 2019-10-01 PROCEDURE — A9270 NON-COVERED ITEM OR SERVICE: HCPCS | Performed by: NURSE PRACTITIONER

## 2019-10-01 PROCEDURE — 99232 SBSQ HOSP IP/OBS MODERATE 35: CPT | Performed by: HOSPITALIST

## 2019-10-01 PROCEDURE — 700111 HCHG RX REV CODE 636 W/ 250 OVERRIDE (IP): Performed by: INTERNAL MEDICINE

## 2019-10-01 PROCEDURE — 700105 HCHG RX REV CODE 258: Performed by: NURSE PRACTITIONER

## 2019-10-01 PROCEDURE — 80053 COMPREHEN METABOLIC PANEL: CPT

## 2019-10-01 PROCEDURE — 700111 HCHG RX REV CODE 636 W/ 250 OVERRIDE (IP)

## 2019-10-01 PROCEDURE — 4A023N7 MEASUREMENT OF CARDIAC SAMPLING AND PRESSURE, LEFT HEART, PERCUTANEOUS APPROACH: ICD-10-PCS | Performed by: INTERNAL MEDICINE

## 2019-10-01 PROCEDURE — 85027 COMPLETE CBC AUTOMATED: CPT

## 2019-10-01 RX ORDER — HEPARIN SODIUM 200 [USP'U]/100ML
INJECTION, SOLUTION INTRAVENOUS
Status: COMPLETED
Start: 2019-10-01 | End: 2019-10-01

## 2019-10-01 RX ORDER — VERAPAMIL HYDROCHLORIDE 2.5 MG/ML
INJECTION, SOLUTION INTRAVENOUS
Status: COMPLETED
Start: 2019-10-01 | End: 2019-10-01

## 2019-10-01 RX ORDER — LIDOCAINE HYDROCHLORIDE 20 MG/ML
INJECTION, SOLUTION INFILTRATION; PERINEURAL
Status: COMPLETED
Start: 2019-10-01 | End: 2019-10-01

## 2019-10-01 RX ORDER — MIDAZOLAM HYDROCHLORIDE 1 MG/ML
INJECTION INTRAMUSCULAR; INTRAVENOUS
Status: COMPLETED
Start: 2019-10-01 | End: 2019-10-01

## 2019-10-01 RX ORDER — HEPARIN SODIUM,PORCINE 1000/ML
VIAL (ML) INJECTION
Status: COMPLETED
Start: 2019-10-01 | End: 2019-10-01

## 2019-10-01 RX ADMIN — SODIUM CHLORIDE: 9 INJECTION, SOLUTION INTRAVENOUS at 04:25

## 2019-10-01 RX ADMIN — VERAPAMIL HYDROCHLORIDE 2.5 MG: 2.5 INJECTION, SOLUTION INTRAVENOUS at 15:32

## 2019-10-01 RX ADMIN — HEPARIN SODIUM 5000 UNITS: 5000 INJECTION INTRAVENOUS; SUBCUTANEOUS at 20:43

## 2019-10-01 RX ADMIN — OMEPRAZOLE 20 MG: 20 CAPSULE, DELAYED RELEASE ORAL at 05:51

## 2019-10-01 RX ADMIN — FUROSEMIDE 40 MG: 10 INJECTION, SOLUTION INTRAMUSCULAR; INTRAVENOUS at 05:50

## 2019-10-01 RX ADMIN — IOHEXOL 18 ML: 350 INJECTION, SOLUTION INTRAVENOUS at 15:51

## 2019-10-01 RX ADMIN — FUROSEMIDE 40 MG: 10 INJECTION, SOLUTION INTRAMUSCULAR; INTRAVENOUS at 18:38

## 2019-10-01 RX ADMIN — METOPROLOL SUCCINATE 25 MG: 25 TABLET, EXTENDED RELEASE ORAL at 05:51

## 2019-10-01 RX ADMIN — SIMVASTATIN 20 MG: 20 TABLET, FILM COATED ORAL at 20:43

## 2019-10-01 RX ADMIN — LIDOCAINE HYDROCHLORIDE: 20 INJECTION, SOLUTION INFILTRATION; PERINEURAL at 15:32

## 2019-10-01 RX ADMIN — MIDAZOLAM HYDROCHLORIDE 2 MG: 1 INJECTION, SOLUTION INTRAMUSCULAR; INTRAVENOUS at 15:33

## 2019-10-01 RX ADMIN — ASPIRIN 81 MG 81 MG: 81 TABLET ORAL at 05:51

## 2019-10-01 RX ADMIN — HEPARIN SODIUM 2000 UNITS: 200 INJECTION, SOLUTION INTRAVENOUS at 15:33

## 2019-10-01 RX ADMIN — HEPARIN SODIUM: 1000 INJECTION, SOLUTION INTRAVENOUS; SUBCUTANEOUS at 15:32

## 2019-10-01 RX ADMIN — FENTANYL CITRATE 50 MCG: 50 INJECTION INTRAMUSCULAR; INTRAVENOUS at 15:50

## 2019-10-01 RX ADMIN — NITROGLYCERIN 10 ML: 20 INJECTION INTRAVENOUS at 15:32

## 2019-10-01 RX ADMIN — LEVOTHYROXINE SODIUM 200 MCG: 100 TABLET ORAL at 05:51

## 2019-10-01 RX ADMIN — LISINOPRIL 10 MG: 5 TABLET ORAL at 05:50

## 2019-10-01 RX ADMIN — MIDAZOLAM HYDROCHLORIDE 1 MG: 1 INJECTION, SOLUTION INTRAMUSCULAR; INTRAVENOUS at 15:50

## 2019-10-01 RX ADMIN — SPIRONOLACTONE 25 MG: 25 TABLET ORAL at 05:51

## 2019-10-01 ASSESSMENT — ENCOUNTER SYMPTOMS
MYALGIAS: 0
FEVER: 0
HEMOPTYSIS: 0
NECK PAIN: 0
SINUS PAIN: 0
BACK PAIN: 0
DEPRESSION: 0
CHILLS: 0
HEADACHES: 0
VOMITING: 0
NAUSEA: 0
DIZZINESS: 0
PALPITATIONS: 0
COUGH: 0
BLURRED VISION: 0
SHORTNESS OF BREATH: 0

## 2019-10-01 NOTE — CARE PLAN
Problem: Knowledge Deficit  Goal: Knowledge of disease process/condition, treatment plan, diagnostic tests, and medications will improve  Outcome: PROGRESSING AS EXPECTED  Note:   Patient updated on plan of care. All medications discussed with patient prior to administration. Patient verbalizes understanding, no questions at this time.          Problem: Respiratory:  Goal: Respiratory status will improve  Outcome: PROGRESSING AS EXPECTED  Note:   Lung sounds clear/diminished. Room air, denies SOB. O2 sats  90%. Able to tolerate laying flat for greater than 30 minutes. Will monitor.

## 2019-10-01 NOTE — PROGRESS NOTES
Report received from Carmencita RN, patient care assumed. Patient is awake, alert and oriented x4. Room air, visible chest rise/fall. Denies SOB, no s/s of distress. Tele monitor on. Bed low, locked position. Call light in reach.     Initial assessment completed. Updated on plan of care. No questions at this time.

## 2019-10-01 NOTE — CARE PLAN
Problem: Venous Thromboembolism (VTW)/Deep Vein Thrombosis (DVT) Prevention:  Goal: Patient will participate in Venous Thrombosis (VTE)/Deep Vein Thrombosis (DVT)Prevention Measures  Outcome: PROGRESSING AS EXPECTED  Note:   Patient educated on DVT risks. Patient VS are stable. No signs of DVT. Will continue to monitor for change status.       Problem: Bowel/Gastric:  Goal: Normal bowel function is maintained or improved  Outcome: PROGRESSING AS EXPECTED  Note:   Patient bowel function is assessed. Education provided on diet, fluid intake and activities that promote bowel function. Toileting at scheduled intervals in place for patient. Patient verbalizes understanding.       Problem: Pain Management  Goal: Pain level will decrease to patient's comfort goal  Outcome: PROGRESSING AS EXPECTED  Note:   Patient educated to pain scale system. Patient encouraged to verbalize discomfort. Patient taught about non-pharmacological pain management. Patient is comfortable at this time without statements of discomfort or pain.

## 2019-10-01 NOTE — PROGRESS NOTES
Bedside report received, assumed care of patient. Pt is A+O x 4. No acute distress noted. Rhythm is Sinus Rhythm. Patient is on oxygen at 1LPM NC. Informed of patient of safety and call bell system. Bed is low/locked, call bell within reach, nonskid footwear in place.

## 2019-10-01 NOTE — PROGRESS NOTES
Monitor Summary:  SR 83-98 bpm  12 beats VT, 7 beats VT - MD aware  (F) Bigem, (O) couplets, (O) PACs, (R) Trigem, (O) PVCs     0.12 / 0.08 / 0.36

## 2019-10-01 NOTE — PROGRESS NOTES
Ivelisse Montague is a 59 y.o. female admitted 9/27/2019 with SOB and ankle swelling over a month ago. She developed lower extremity edema. ECHO showed ef 20%.      Past medical history of hyperlipidemia, hypothyroidism, strong family history of CAD, tobacco abuse and GERD.    Plan for cardiac catheterization today, NPO since midnight. Creatinine 1.11 and GFR 50 improving.

## 2019-10-02 ENCOUNTER — PATIENT OUTREACH (OUTPATIENT)
Dept: HEALTH INFORMATION MANAGEMENT | Facility: OTHER | Age: 59
End: 2019-10-02

## 2019-10-02 VITALS
OXYGEN SATURATION: 96 % | DIASTOLIC BLOOD PRESSURE: 55 MMHG | SYSTOLIC BLOOD PRESSURE: 96 MMHG | WEIGHT: 208.56 LBS | BODY MASS INDEX: 38.38 KG/M2 | HEIGHT: 62 IN | HEART RATE: 58 BPM | RESPIRATION RATE: 18 BRPM | TEMPERATURE: 97.5 F

## 2019-10-02 LAB
ANION GAP SERPL CALC-SCNC: 11 MMOL/L (ref 0–11.9)
BASOPHILS # BLD AUTO: 0.6 % (ref 0–1.8)
BASOPHILS # BLD: 0.05 K/UL (ref 0–0.12)
BUN SERPL-MCNC: 40 MG/DL (ref 8–22)
CALCIUM SERPL-MCNC: 8.7 MG/DL (ref 8.5–10.5)
CHLORIDE SERPL-SCNC: 100 MMOL/L (ref 96–112)
CO2 SERPL-SCNC: 26 MMOL/L (ref 20–33)
CREAT SERPL-MCNC: 1.03 MG/DL (ref 0.5–1.4)
EOSINOPHIL # BLD AUTO: 0.18 K/UL (ref 0–0.51)
EOSINOPHIL NFR BLD: 2.3 % (ref 0–6.9)
ERYTHROCYTE [DISTWIDTH] IN BLOOD BY AUTOMATED COUNT: 52.9 FL (ref 35.9–50)
GLUCOSE SERPL-MCNC: 96 MG/DL (ref 65–99)
HCT VFR BLD AUTO: 49.3 % (ref 37–47)
HGB BLD-MCNC: 15.6 G/DL (ref 12–16)
IMM GRANULOCYTES # BLD AUTO: 0.02 K/UL (ref 0–0.11)
IMM GRANULOCYTES NFR BLD AUTO: 0.3 % (ref 0–0.9)
LYMPHOCYTES # BLD AUTO: 1.83 K/UL (ref 1–4.8)
LYMPHOCYTES NFR BLD: 23.5 % (ref 22–41)
MCH RBC QN AUTO: 29.3 PG (ref 27–33)
MCHC RBC AUTO-ENTMCNC: 31.6 G/DL (ref 33.6–35)
MCV RBC AUTO: 92.5 FL (ref 81.4–97.8)
MONOCYTES # BLD AUTO: 1.06 K/UL (ref 0–0.85)
MONOCYTES NFR BLD AUTO: 13.6 % (ref 0–13.4)
NEUTROPHILS # BLD AUTO: 4.65 K/UL (ref 2–7.15)
NEUTROPHILS NFR BLD: 59.7 % (ref 44–72)
NRBC # BLD AUTO: 0 K/UL
NRBC BLD-RTO: 0 /100 WBC
PLATELET # BLD AUTO: 286 K/UL (ref 164–446)
PMV BLD AUTO: 9.4 FL (ref 9–12.9)
POTASSIUM SERPL-SCNC: 4.3 MMOL/L (ref 3.6–5.5)
RBC # BLD AUTO: 5.33 M/UL (ref 4.2–5.4)
SODIUM SERPL-SCNC: 137 MMOL/L (ref 135–145)
WBC # BLD AUTO: 7.8 K/UL (ref 4.8–10.8)

## 2019-10-02 PROCEDURE — 36415 COLL VENOUS BLD VENIPUNCTURE: CPT

## 2019-10-02 PROCEDURE — 700102 HCHG RX REV CODE 250 W/ 637 OVERRIDE(OP): Performed by: NURSE PRACTITIONER

## 2019-10-02 PROCEDURE — A9270 NON-COVERED ITEM OR SERVICE: HCPCS | Performed by: INTERNAL MEDICINE

## 2019-10-02 PROCEDURE — 80048 BASIC METABOLIC PNL TOTAL CA: CPT

## 2019-10-02 PROCEDURE — A9270 NON-COVERED ITEM OR SERVICE: HCPCS | Performed by: NURSE PRACTITIONER

## 2019-10-02 PROCEDURE — 700102 HCHG RX REV CODE 250 W/ 637 OVERRIDE(OP): Performed by: INTERNAL MEDICINE

## 2019-10-02 PROCEDURE — 85025 COMPLETE CBC W/AUTO DIFF WBC: CPT

## 2019-10-02 PROCEDURE — 700111 HCHG RX REV CODE 636 W/ 250 OVERRIDE (IP): Performed by: INTERNAL MEDICINE

## 2019-10-02 PROCEDURE — 99239 HOSP IP/OBS DSCHRG MGMT >30: CPT | Performed by: HOSPITALIST

## 2019-10-02 PROCEDURE — 99232 SBSQ HOSP IP/OBS MODERATE 35: CPT | Performed by: INTERNAL MEDICINE

## 2019-10-02 RX ORDER — FUROSEMIDE 20 MG/1
20 TABLET ORAL
Status: DISCONTINUED | OUTPATIENT
Start: 2019-10-03 | End: 2019-10-02 | Stop reason: HOSPADM

## 2019-10-02 RX ORDER — FUROSEMIDE 20 MG/1
20 TABLET ORAL DAILY
Qty: 60 TAB | Refills: 0 | Status: SHIPPED | OUTPATIENT
Start: 2019-10-03 | End: 2019-12-20

## 2019-10-02 RX ORDER — ASPIRIN 81 MG/1
81 TABLET ORAL DAILY
Qty: 30 TAB | Refills: 0 | Status: SHIPPED | OUTPATIENT
Start: 2019-10-02

## 2019-10-02 RX ORDER — LISINOPRIL 10 MG/1
10 TABLET ORAL DAILY
Qty: 30 TAB | Refills: 0 | Status: SHIPPED | OUTPATIENT
Start: 2019-10-03 | End: 2019-11-01 | Stop reason: SDUPTHER

## 2019-10-02 RX ORDER — PANTOPRAZOLE SODIUM 40 MG/1
40 TABLET, DELAYED RELEASE ORAL DAILY
Qty: 30 TAB | Refills: 0 | Status: SHIPPED | OUTPATIENT
Start: 2019-10-02 | End: 2019-10-08

## 2019-10-02 RX ORDER — SPIRONOLACTONE 25 MG/1
25 TABLET ORAL DAILY
Qty: 30 TAB | Refills: 0 | Status: SHIPPED | OUTPATIENT
Start: 2019-10-03 | End: 2019-11-01 | Stop reason: SDUPTHER

## 2019-10-02 RX ORDER — SIMVASTATIN 20 MG
20 TABLET ORAL EVERY EVENING
Qty: 30 TAB | Refills: 0 | Status: SHIPPED | OUTPATIENT
Start: 2019-10-02 | End: 2019-11-01 | Stop reason: SDUPTHER

## 2019-10-02 RX ORDER — MAGNESIUM OXIDE 400 MG/1
400 TABLET ORAL
Qty: 30 TAB | Refills: 0 | Status: SHIPPED | OUTPATIENT
Start: 2019-10-02 | End: 2019-10-08 | Stop reason: CLARIF

## 2019-10-02 RX ORDER — METOPROLOL SUCCINATE 25 MG/1
25 TABLET, EXTENDED RELEASE ORAL DAILY
Qty: 30 TAB | Refills: 0 | Status: SHIPPED | OUTPATIENT
Start: 2019-10-03 | End: 2019-11-01 | Stop reason: SDUPTHER

## 2019-10-02 RX ADMIN — LEVOTHYROXINE SODIUM 200 MCG: 100 TABLET ORAL at 05:18

## 2019-10-02 RX ADMIN — METOPROLOL SUCCINATE 25 MG: 25 TABLET, EXTENDED RELEASE ORAL at 05:17

## 2019-10-02 RX ADMIN — OMEPRAZOLE 20 MG: 20 CAPSULE, DELAYED RELEASE ORAL at 05:18

## 2019-10-02 RX ADMIN — SPIRONOLACTONE 25 MG: 25 TABLET ORAL at 05:17

## 2019-10-02 RX ADMIN — LISINOPRIL 10 MG: 5 TABLET ORAL at 05:18

## 2019-10-02 RX ADMIN — HEPARIN SODIUM 5000 UNITS: 5000 INJECTION INTRAVENOUS; SUBCUTANEOUS at 05:17

## 2019-10-02 RX ADMIN — FUROSEMIDE 40 MG: 10 INJECTION, SOLUTION INTRAMUSCULAR; INTRAVENOUS at 05:17

## 2019-10-02 RX ADMIN — SENNOSIDES, DOCUSATE SODIUM 2 TABLET: 50; 8.6 TABLET, FILM COATED ORAL at 05:18

## 2019-10-02 RX ADMIN — MAGNESIUM GLUCONATE 500 MG ORAL TABLET 400 MG: 500 TABLET ORAL at 07:54

## 2019-10-02 RX ADMIN — ASPIRIN 81 MG 81 MG: 81 TABLET ORAL at 05:18

## 2019-10-02 ASSESSMENT — ENCOUNTER SYMPTOMS
COLOR CHANGE: 0
COUGH: 0
CHILLS: 0
AGITATION: 0
BLOOD IN STOOL: 0
PALPITATIONS: 0
CONFUSION: 0
FEVER: 0
ABDOMINAL DISTENTION: 0
TROUBLE SWALLOWING: 0
NERVOUS/ANXIOUS: 0
NUMBNESS: 0
ABDOMINAL PAIN: 0
DIAPHORESIS: 0
SHORTNESS OF BREATH: 0
DIZZINESS: 0
CHEST TIGHTNESS: 0

## 2019-10-02 NOTE — DISCHARGE INSTRUCTIONS
Discharge Instructions    Discharged to home by car with relative. Discharged via wheelchair, hospital escort: Yes.  Special equipment needed: Not Applicable    Be sure to schedule a follow-up appointment with your primary care doctor or any specialists as instructed.     Discharge Plan: Homme  Diet Plan: Discussed - Heart Healthy  Activity Level: Discussed - As tolerated  Confirmed Follow up Appointment: Appointment Scheduled  Confirmed Symptoms Management: Discussed  Medication Reconciliation Updated: Yes  Influenza Vaccine Indication: Indicated: 9 to 64 years of age  Influenza Vaccine Given - only chart on this line when given: Influenza Vaccine Given (See MAR)    I understand that a diet low in cholesterol, fat, and sodium is recommended for good health. Unless I have been given specific instructions below for another diet, I accept this instruction as my diet prescription.   Other diet: Heart Healthy - Low Fat, Low Cholesterol, Low Salt    Special Instructions:   HF Patient Discharge Instructions  · Monitor your weight daily, and maintain a weight chart, to track your weight changes.   · Activity as tolerated, unless your Doctor has ordered otherwise. Other activity order: As tolerated.  · Follow a low fat, low cholesterol, low salt diet unless instructed otherwise by your Doctor. Read the labels on the back of food products and track your intake of fat, cholesterol and salt.   · Fluid Restriction No. If a Fluid Restriction has been ordered by your Doctor, measure fluids with a measuring cup to ensure that you are not exceeding the restriction.   · No smoking.  · Oxygen No. If your Doctor has ordered that you wear Oxygen at home, it is important to wear it as ordered.  · Did you receive an explanation from staff on the importance of taking each of your medications and why it is necessary to keep taking them unless your doctor says to stop? Yes  · Were all of your questions answered about how to manage your  heart failure and what to do if you have increased signs and symptoms after you go home? Yes  · Do you feel like your heart failure care team involved you in the care treatment plan and allowed you to make decisions regarding your care while in the hospital and addressed any discharge needs you might have? Yes    See the educational handout provided at discharge for more information on monitoring your daily weight, activity and diet. This also explains more about Heart Failure, symptoms of a flare-up and some of the tests that you have undergone.     Warning Signs of a Flare-Up include:  · Swelling in the ankles or lower legs.  · Shortness of breath, while at rest, or while doing normal activities.   · Shortness of breath at night when in bed, or coughing in bed.   · Requiring more pillows to sleep at night, or needing to sit up at night to sleep.  · Feeling weak, dizzy or fatigued.     When to call your Doctor:  · Call [facility] seven days a week from 8:00 a.m. to 8:00 p.m. for medical questions [phone].  · Call your Primary Care Physician or Cardiologist if:   1. You experience any pain radiating to your jaw or neck.  2. You have any difficulty breathing.  3. You experience weight gain of 3 lbs in a day or 5 lbs in a week.   4. You feel any palpitations or irregular heartbeats.  5. You become dizzy or lose consciousness.   If you have had an angiogram or had a pacemaker or AICD placed, and experience:  1. Bleeding, drainage or swelling at the surgical / puncture site.  2. Fever greater than 100.0 F  3. Shock from internal defibrillator.  4. Cool and / or numb extremities.      · Is patient discharged on Warfarin / Coumadin?   No     Depression / Suicide Risk    As you are discharged from this [facility], it is important to learn how to keep safe from harming yourself.    Recognize the warning signs:  · Abrupt changes in personality, positive or negative- including increase in energy    · Giving away possessions  · Change in eating patterns- significant weight changes-  positive or negative  · Change in sleeping patterns- unable to sleep or sleeping all the time   · Unwillingness or inability to communicate  · Depression  · Unusual sadness, discouragement and loneliness  · Talk of wanting to die  · Neglect of personal appearance   · Rebelliousness- reckless behavior  · Withdrawal from people/activities they love  · Confusion- inability to concentrate     If you or a loved one observes any of these behaviors or has concerns about self-harm, here's what you can do:  · Talk about it- your feelings and reasons for harming yourself  · Remove any means that you might use to hurt yourself (examples: pills, rope, extension cords, firearm)  · Get professional help from the community (Mental Health, Substance Abuse, psychological counseling)  · Do not be alone:Call your Safe Contact- someone whom you trust who will be there for you.  · Call your local CRISIS HOTLINE 656-0649 or 938-292-7452  · Call your local Children's Mobile Crisis Response Team [place] (942) 166-4924 or wwwMessageGate  · Call the toll free National Suicide Prevention Hotlines   · National Suicide Prevention Lifeline 799-499-WXGN (9834)  · National Hope Line Network 800-SUICIDE (097-1899)        Heart Failure  Heart failure is a condition in which the heart has trouble pumping blood because it has become weak or stiff. This means that the heart does not pump blood efficiently for the body to work well. For some people with heart failure, fluid may back up into the lungs and there may be swelling (edema) in the lower legs. Heart failure is usually a long-term (chronic) condition. It is important for you to take good care of yourself and follow the treatment plan from your health care provider.  What are the causes?  This condition is caused by some health problems, including:  · High blood pressure (hypertension). Hypertension  causes the heart muscle to work harder than normal. High blood pressure eventually causes the heart to become stiff and weak.  · Coronary artery disease (CAD). CAD is the buildup of cholesterol and fat (plaques) in the arteries of the heart.  · Heart attack (myocardial infarction). Injured tissue, which is caused by the heart attack, does not contract as well and the heart's ability to pump blood is weakened.  · Abnormal heart valves. When the heart valves do not open and close properly, the heart muscle must pump harder to keep the blood flowing.  · Heart muscle disease (cardiomyopathy or myocarditis). Heart muscle disease is damage to the heart muscle from a variety of causes, such as drug or alcohol abuse, infections, or unknown causes. These can increase the risk of heart failure.  · Lung disease. When the lungs do not work properly, the heart must work harder.  What increases the risk?  Risk of heart failure increases as a person ages. This condition is also more likely to develop in people who:  · Are overweight.  · Are male.  · Smoke or chew tobacco.  · Abuse alcohol or illegal drugs.  · Have taken medicines that can damage the heart, such as chemotherapy drugs.  · Have diabetes.  ¨ High blood sugar (glucose) is associated with high fat (lipid) levels in the blood.  ¨ Diabetes can also damage tiny blood vessels that carry nutrients to the heart muscle.  · Have abnormal heart rhythms.  · Have thyroid problems.  · Have low blood counts (anemia).  What are the signs or symptoms?  Symptoms of this condition include:  · Shortness of breath with activity, such as when climbing stairs.  · Persistent cough.  · Swelling of the feet, ankles, legs, or abdomen.  · Unexplained weight gain.  · Difficulty breathing when lying flat (orthopnea).  · Waking from sleep because of the need to sit up and get more air.  · Rapid heartbeat.  · Fatigue and loss of energy.  · Feeling light-headed, dizzy, or close to fainting.  · Loss  of appetite.  · Nausea.  · Increased urination during the night (nocturia).  · Confusion.  How is this diagnosed?  This condition is diagnosed based on:  · Medical history, symptoms, and a physical exam.  · Diagnostic tests, which may include:  ¨ Echocardiogram.  ¨ Electrocardiogram (ECG).  ¨ Chest X-ray.  ¨ Blood tests.  ¨ Exercise stress test.  ¨ Radionuclide scans.  ¨ Cardiac catheterization and angiogram.  How is this treated?  Treatment for this condition is aimed at managing the symptoms of heart failure. Medicines, behavioral changes, or other treatments may be necessary to treat heart failure.  Medicines   These may include:  · Angiotensin-converting enzyme (ACE) inhibitors. This type of medicine blocks the effects of a blood protein called angiotensin-converting enzyme. ACE inhibitors relax (dilate) the blood vessels and help to lower blood pressure.  · Angiotensin receptor blockers (ARBs). This type of medicine blocks the actions of a blood protein called angiotensin. ARBs dilate the blood vessels and help to lower blood pressure.  · Water pills (diuretics). Diuretics cause the kidneys to remove salt and water from the blood. The extra fluid is removed through urination, leaving a lower volume of blood that the heart has to pump.  · Beta blockers. These improve heart muscle strength and they prevent the heart from beating too quickly.  · Digoxin. This increases the force of the heartbeat.  Healthy behavior changes   These may include:  · Reaching and maintaining a healthy weight.  · Stopping smoking or chewing tobacco.  · Eating heart-healthy foods.  · Limiting or avoiding alcohol.  · Stopping use of street drugs (illegal drugs).  · Physical activity.  Other treatments   These may include:  · Surgery to open blocked coronary arteries or repair damaged heart valves.  · Placement of a biventricular pacemaker to improve heart muscle function (cardiac resynchronization therapy). This device paces both the  right ventricle and left ventricle.  · Placement of a device to treat serious abnormal heart rhythms (implantable cardioverter defibrillator, or ICD).  · Placement of a device to improve the pumping ability of the heart (left ventricular assist device, or LVAD).  · Heart transplant. This can cure heart failure, and it is considered for certain patients who do not improve with other therapies.  Follow these instructions at home:  Medicines  · Take over-the-counter and prescription medicines only as told by your health care provider. Medicines are important in reducing the workload of your heart, slowing the progression of heart failure, and improving your symptoms.  ¨ Do not stop taking your medicine unless your health care provider told you to do that.  ¨ Do not skip any dose of medicine.  ¨ Refill your prescriptions before you run out of medicine. You need your medicines every day.  Eating and drinking  · Eat heart-healthy foods. Talk with a dietitian to make an eating plan that is right for you.  ¨ Choose foods that contain no trans fat and are low in saturated fat and cholesterol. Healthy choices include fresh or frozen fruits and vegetables, fish, lean meats, legumes, fat-free or low-fat dairy products, and whole-grain or high-fiber foods.  ¨ Limit salt (sodium) if directed by your health care provider. Sodium restriction may reduce symptoms of heart failure. Ask a dietitian to recommend heart-healthy seasonings.  ¨ Use healthy cooking methods instead of frying. Healthy methods include roasting, grilling, broiling, baking, poaching, steaming, and stir-frying.  · Limit your fluid intake if directed by your health care provider. Fluid restriction may reduce symptoms of heart failure.  Lifestyle  · Stop smoking or using chewing tobacco. Nicotine and tobacco can damage your heart and your blood vessels. Do not use nicotine gum or patches before talking to your health care provider.  · Limit alcohol intake to no more  than 1 drink per day for non-pregnant women and 2 drinks per day for men. One drink equals 12 oz of beer, 5 oz of wine, or 1½ oz of hard liquor.  ¨ Drinking more than that is harmful to your heart. Tell your health care provider if you drink alcohol several times a week.  ¨ Talk with your health care provider about whether any level of alcohol use is safe for you.  ¨ If your heart has already been damaged by alcohol or you have severe heart failure, drinking alcohol should be stopped completely.  · Stop use of illegal drugs.  · Lose weight if directed by your health care provider. Weight loss may reduce symptoms of heart failure.  · Do moderate physical activity if directed by your health care provider. People who are elderly and people with severe heart failure should consult with a health care provider for physical activity recommendations.  Monitor important information  · Weigh yourself every day. Keeping track of your weight daily helps you to notice excess fluid sooner.  ¨ Weigh yourself every morning after you urinate and before you eat breakfast.  ¨ Wear the same amount of clothing each time you weigh yourself.  ¨ Record your daily weight. Provide your health care provider with your weight record.  · Monitor and record your blood pressure as told by your health care provider.  · Check your pulse as told by your health care provider.  Dealing with extreme temperatures  · If the weather is extremely hot:  ¨ Avoid vigorous physical activity.  ¨ Use air conditioning or fans or seek a cooler location.  ¨ Avoid caffeine and alcohol.  ¨ Wear loose-fitting, lightweight, and light-colored clothing.  · If the weather is extremely cold:  ¨ Avoid vigorous physical activity.  ¨ Layer your clothes.  ¨ Wear mittens or gloves, a hat, and a scarf when you go outside.  ¨ Avoid alcohol.  General instructions  · Manage other health conditions such as hypertension, diabetes, thyroid disease, or abnormal heart rhythms as told by  your health care provider.  · Learn to manage stress. If you need help to do this, ask your health care provider.  · Plan rest periods when fatigued.  · Get ongoing education and support as needed.  · Participate in or seek rehabilitation as needed to maintain or improve independence and quality of life.  · Stay up to date with immunizations. Keeping current on pneumococcal and influenza immunizations is especially important to prevent respiratory infections.  · Keep all follow-up visits as told by your health care provider. This is important.  Contact a health care provider if:  · You have a rapid weight gain.  · You have increasing shortness of breath that is unusual for you.  · You are unable to participate in your usual physical activities.  · You tire easily.  · You cough more than normal, especially with physical activity.  · You have any swelling or more swelling in areas such as your hands, feet, ankles, or abdomen.  · You are unable to sleep because it is hard to breathe.  · You feel like your heart is beating quickly (palpitations).  · You become dizzy or light-headed when you stand up.  Get help right away if:  · You have difficulty breathing.  · You notice or your family notices a change in your awareness, such as having trouble staying awake or having difficulty with concentration.  · You have pain or discomfort in your chest.  · You have an episode of fainting (syncope).  This information is not intended to replace advice given to you by your health care provider. Make sure you discuss any questions you have with your health care provider.  Document Released: 12/18/2006 Document Revised: 08/22/2017 Document Reviewed: 07/12/2017  [employer] Interactive Patient Education © 2017 [employer]        Radial Site Care  Introduction  Refer to this sheet in the next few weeks. These instructions provide you with information about caring for yourself after your procedure. Your health care provider may also give  you more specific instructions. Your treatment has been planned according to current medical practices, but problems sometimes occur. Call your health care provider if you have any problems or questions after your procedure.  What can I expect after the procedure?  After your procedure, it is typical to have the following:  · Bruising at the radial site that usually fades within 1-2 weeks.  · Blood collecting in the tissue (hematoma) that may be painful to the touch. It should usually decrease in size and tenderness within 1-2 weeks.  Follow these instructions at home:  · Take medicines only as directed by your health care provider.  · You may shower 24-48 hours after the procedure or as directed by your health care provider. Remove the bandage (dressing) and gently wash the site with plain soap and water. Pat the area dry with a clean towel. Do not rub the site, because this may cause bleeding.  · Do not take baths, swim, or use a hot tub until your health care provider approves.  · Check your insertion site every day for redness, swelling, or drainage.  · Do not apply powder or lotion to the site.  · Do not flex or bend the affected arm for 24 hours or as directed by your health care provider.  · Do not push or pull heavy objects with the affected arm for 24 hours or as directed by your health care provider.  · Do not lift over 10 lb (4.5 kg) for 5 days after your procedure or as directed by your health care provider.  · Ask your health care provider when it is okay to:  ¨ Return to work or school.  ¨ Resume usual physical activities or sports.  ¨ Resume sexual activity.  · Do not drive home if you are discharged the same day as the procedure. Have someone else drive you.  · You may drive 24 hours after the procedure unless otherwise instructed by your health care provider.  · Do not operate machinery or power tools for 24 hours after the procedure.  · If your procedure was done as an outpatient procedure, which  means that you went home the same day as your procedure, a responsible adult should be with you for the first 24 hours after you arrive home.  · Keep all follow-up visits as directed by your health care provider. This is important.  Contact a health care provider if:  · You have a fever.  · You have chills.  · You have increased bleeding from the radial site. Hold pressure on the site.  Get help right away if:  · You have unusual pain at the radial site.  · You have redness, warmth, or swelling at the radial site.  · You have drainage (other than a small amount of blood on the dressing) from the radial site.  · The radial site is bleeding, and the bleeding does not stop after 30 minutes of holding steady pressure on the site.  · Your arm or hand becomes pale, cool, tingly, or numb.  This information is not intended to replace advice given to you by your health care provider. Make sure you discuss any questions you have with your health care provider.  Document Released: 01/20/2012 Document Revised: 05/25/2017 Document Reviewed: 07/06/2015  © 2017 [employer]        Heart-Healthy Eating Plan  Introduction  Heart-healthy meal planning includes:  · Limiting unhealthy fats.  · Increasing healthy fats.  · Making other small dietary changes.  You may need to talk with your doctor or a diet specialist (dietitian) to create an eating plan that is right for you.  What types of fat should I choose?  · Choose healthy fats. These include olive oil and canola oil, flaxseeds, walnuts, almonds, and seeds.  · Eat more omega-3 fats. These include salmon, mackerel, sardines, tuna, flaxseed oil, and ground flaxseeds. Try to eat fish at least twice each week.  · Limit saturated fats.  ¨ Saturated fats are often found in animal products, such as meats, butter, and cream.  ¨ Plant sources of saturated fats include palm oil, palm kernel oil, and coconut oil.  · Avoid foods with partially hydrogenated oils in them. These include stick  "margarine, some tub margarines, cookies, crackers, and other baked goods. These contain trans fats.  What general guidelines do I need to follow?  · Check food labels carefully. Identify foods with trans fats or high amounts of saturated fat.  · Fill one half of your plate with vegetables and green salads. Eat 4-5 servings of vegetables per day. A serving of vegetables is:  ¨ 1 cup of raw leafy vegetables.  ¨ ½ cup of raw or cooked cut-up vegetables.  ¨ ½ cup of vegetable juice.  · Fill one fourth of your plate with whole grains. Look for the word \"whole\" as the first word in the ingredient list.  · Fill one fourth of your plate with lean protein foods.  · Eat 4-5 servings of fruit per day. A serving of fruit is:  ¨ One medium whole fruit.  ¨ ¼ cup of dried fruit.  ¨ ½ cup of fresh, frozen, or canned fruit.  ¨ ½ cup of 100% fruit juice.  · Eat more foods that contain soluble fiber. These include apples, broccoli, carrots, beans, peas, and barley. Try to get 20-30 g of fiber per day.  · Eat more home-cooked food. Eat less restaurant, buffet, and fast food.  · Limit or avoid alcohol.  · Limit foods high in starch and sugar.  · Avoid fried foods.  · Avoid frying your food. Try baking, boiling, grilling, or broiling it instead. You can also reduce fat by:  ¨ Removing the skin from poultry.  ¨ Removing all visible fats from meats.  ¨ Skimming the fat off of stews, soups, and gravies before serving them.  ¨ Steaming vegetables in water or broth.  · Lose weight if you are overweight.  · Eat 4-5 servings of nuts, legumes, and seeds per week:  ¨ One serving of dried beans or legumes equals ½ cup after being cooked.  ¨ One serving of nuts equals 1½ ounces.  ¨ One serving of seeds equals ½ ounce or one tablespoon.  · You may need to keep track of how much salt or sodium you eat. This is especially true if you have high blood pressure. Talk with your doctor or dietitian to get more information.  What foods can I eat?  Grains "   Breads, including Bengali, white, obdulio, wheat, raisin, rye, oatmeal, and Italian. Tortillas that are neither fried nor made with lard or trans fat. Low-fat rolls, including hotdog and hamburger buns and English muffins. Biscuits. Muffins. Waffles. Pancakes. Light popcorn. Whole-grain cereals. Flatbread. Ny toast. Pretzels. Breadsticks. Rusks. Low-fat snacks. Low-fat crackers, including oyster, saltine, matzo, lauren, animal, and rye. Rice and pasta, including brown rice and pastas that are made with whole wheat.  Vegetables   All vegetables.  Fruits   All fruits, but limit coconut.  Meats and Other Protein Sources   Lean, well-trimmed beef, veal, pork, and lamb. Chicken and turkey without skin. All fish and shellfish. Wild duck, rabbit, pheasant, and venison. Egg whites or low-cholesterol egg substitutes. Dried beans, peas, lentils, and tofu. Seeds and most nuts.  Dairy   Low-fat or nonfat cheeses, including ricotta, string, and mozzarella. Skim or 1% milk that is liquid, powdered, or evaporated. Buttermilk that is made with low-fat milk. Nonfat or low-fat yogurt.  Beverages   Mineral water. Diet carbonated beverages.  Sweets and Desserts   Sherbets and fruit ices. Honey, jam, marmalade, jelly, and syrups. Meringues and gelatins. Pure sugar candy, such as hard candy, jelly beans, gumdrops, mints, marshmallows, and small amounts of dark chocolate. Vince food cake.  Eat all sweets and desserts in moderation.  Fats and Oils   Nonhydrogenated (trans-free) margarines. Vegetable oils, including soybean, sesame, sunflower, olive, peanut, safflower, corn, canola, and cottonseed. Salad dressings or mayonnaise made with a vegetable oil. Limit added fats and oils that you use for cooking, baking, salads, and as spreads.  Other   Cocoa powder. Coffee and tea. All seasonings and condiments.  The items listed above may not be a complete list of recommended foods or beverages. Contact your dietitian for more options.   What  foods are not recommended?  Grains   Breads that are made with saturated or trans fats, oils, or whole milk. Croissants. Butter rolls. Cheese breads. Sweet rolls. Donuts. Buttered popcorn. Chow mein noodles. High-fat crackers, such as cheese or butter crackers.  Meats and Other Protein Sources   Fatty meats, such as hotdogs, short ribs, sausage, spareribs, toussaint, rib eye roast or steak, and mutton. High-fat deli meats, such as salami and bologna. Caviar. Domestic duck and goose. Organ meats, such as kidney, liver, sweetbreads, and heart.  Dairy   Cream, sour cream, cream cheese, and creamed cottage cheese. Whole-milk cheeses, including blue (chandan), Jarrell Dinh, Brie, Vick, American, Havarti, Swiss, cheddar, Camembert, and Blandford. Whole or 2% milk that is liquid, evaporated, or condensed. Whole buttermilk. Cream sauce or high-fat cheese sauce. Yogurt that is made from whole milk.  Beverages   Regular sodas and juice drinks with added sugar.  Sweets and Desserts   Frosting. Pudding. Cookies. Cakes other than juli food cake. Candy that has milk chocolate or white chocolate, hydrogenated fat, butter, coconut, or unknown ingredients. Buttered syrups. Full-fat ice cream or ice cream drinks.  Fats and Oils   Gravy that has suet, meat fat, or shortening. Cocoa butter, hydrogenated oils, palm oil, coconut oil, palm kernel oil. These can often be found in baked products, candy, fried foods, nondairy creamers, and whipped toppings. Solid fats and shortenings, including toussaint fat, salt pork, lard, and butter. Nondairy cream substitutes, such as coffee creamers and sour cream substitutes. Salad dressings that are made of unknown oils, cheese, or sour cream.  The items listed above may not be a complete list of foods and beverages to avoid. Contact your dietitian for more information.   This information is not intended to replace advice given to you by your health care provider. Make sure you discuss any questions you have  with your health care provider.  Document Released: 06/18/2013 Document Revised: 05/25/2017 Document Reviewed: 06/11/2015  © 2017 Elsevier

## 2019-10-02 NOTE — PROGRESS NOTES
[facility] Medicine Daily Progress Note    Date of Service  10/1/2019    Chief Complaint  59 y.o. female admitted 9/27/2019 with shortness of breath    Hospital Course    This is a 58 y/o F with obesity, GERD, hypothyroidism, and hyperlipidemia, nondiabetic, with no prior cardiac history, who was admitted on 9/27/19 after presenting to ER complaining of shortness of breath and leg edema  in the last 2 to 3 months, which was significantly worse in the last 1 week. Pt has been found to have new diagnosis of systolic heart failure, and cardiology has been consulted.        Interval Problem Update  Patient resting in bed, no new complains, not in distress, no fever or chills. No chest pain.     Consultants/Specialty  Cardiology    Code Status  Full code     Disposition  TBD     Review of Systems  Review of Systems   Constitutional: Negative for chills and fever.   HENT: Negative for ear discharge, ear pain and sinus pain.    Eyes: Negative for blurred vision.   Respiratory: Negative for cough, hemoptysis and shortness of breath.    Cardiovascular: Negative for chest pain and palpitations.   Gastrointestinal: Negative for nausea and vomiting.   Genitourinary: Negative for dysuria and frequency.   Musculoskeletal: Negative for back pain, myalgias and neck pain.   Neurological: Negative for dizziness and headaches.   Psychiatric/Behavioral: Negative for depression.        Physical Exam  Temp:  [35.7 °C (96.3 °F)-36.5 °C (97.7 °F)] 35.9 °C (96.6 °F)  Pulse:  [46-99] 86  Resp:  [16-18] 16  BP: ()/(47-84) 88/64  SpO2:  [92 %-99 %] 98 %    Physical Exam   Constitutional: She is oriented to person, place, and time. She appears well-developed and well-nourished. No distress.   HENT:   Head: Normocephalic and atraumatic.   Mouth/Throat: No oropharyngeal exudate.   Eyes: Conjunctivae are normal. No scleral icterus.   Neck: Normal range of motion. Neck supple.   Cardiovascular: Normal rate and regular rhythm. Exam reveals no  gallop and no friction rub.   No murmur heard.  Pulmonary/Chest: Effort normal. No respiratory distress. She has no wheezes. She has rales.   Abdominal: Soft. Bowel sounds are normal. She exhibits no distension. There is no tenderness. There is no rebound.   Musculoskeletal: Normal range of motion. She exhibits edema (lower ext bilateral. ). She exhibits no tenderness.   Lymphadenopathy:     She has no cervical adenopathy.   Neurological: She is alert and oriented to person, place, and time. No cranial nerve deficit.   Skin: Skin is warm and dry. No erythema.   Psychiatric: She has a normal mood and affect. Her behavior is normal.   Nursing note and vitals reviewed.      Fluids    Intake/Output Summary (Last 24 hours) at 10/1/2019 1808  Last data filed at 10/1/2019 1221  Gross per 24 hour   Intake 356.67 ml   Output 1800 ml   Net -1443.33 ml       Laboratory  Recent Labs     09/30/19  0245 09/30/19  1119 10/01/19  0036   WBC 10.2 9.2 10.0   RBC 5.80* 6.17* 5.79*   HEMOGLOBIN 17.0* 18.9* 17.0*   HEMATOCRIT 53.1* 57.7* 53.2*   MCV 91.6 93.5 91.9   MCH 29.3 30.6 29.4   MCHC 32.0* 32.8* 32.0*   RDW 53.1* 53.8* 53.5*   PLATELETCT 330 334 357   MPV 8.8* 9.2 9.1     Recent Labs     09/29/19  0256 09/30/19  0245 10/01/19  0036   SODIUM 141 138 137   POTASSIUM 3.7 3.6 4.2   CHLORIDE 98 99 98   CO2 33 27 27   GLUCOSE 102* 122* 113*   BUN 28* 32* 40*   CREATININE 1.17 0.94 1.11   CALCIUM 10.0 9.7 9.6     Recent Labs     09/30/19  1119   APTT 25.6   INR 0.93               Imaging  EC-ECHOCARDIOGRAM COMPLETE W/ CONT   Final Result      DX-CHEST-PORTABLE (1 VIEW)   Final Result      Left mid to lower lung atelectasis possible pneumonitis.      CL-LEFT HEART CATHETERIZATION WITH POSSIBLE INTERVENTION    (Results Pending)        Assessment/Plan  * New onset of congestive heart failure (HCC)- (present on admission)  Assessment & Plan  -cont on  diuresis with IV Lasix 40 mg twice daily.  Close intake and output monitoring, and daily  weights.    -Echo showing EF of 20%   Cardiac cath today.     Elevated troponin- (present on admission)  Assessment & Plan  -Likely demand ischemia from new onset heart failure.  No EKG changes, and no complaints of chest pain.  Cardiac cath today.      Obesity- (present on admission)  Assessment & Plan  - Body mass index is 41.33 kg/m²..  Needs to lose weight    GERD (gastroesophageal reflux disease)- (present on admission)  Assessment & Plan  On protonix    Hypothyroidism- (present on admission)  Assessment & Plan  Restart supplement.    Hyperlipidemia- (present on admission)  Assessment & Plan  Continue statin       VTE prophylaxis: heparin

## 2019-10-02 NOTE — PROGRESS NOTES
Patient discharged. IV removed, and tele box removed. Belongings packed. Significant other at bedside with patient.    Dean education printed on all new medications and provided.     Patient provided HF booklet and scale.  Discharge instructions, HF instructions, and new medication education discussed and reviewed with patient and family. Patient verbalizes understanding. Verbalizes understanding of cardiology follow up on Oct. 8.     Escorted out to car per wheelchair, with CNA and family.

## 2019-10-02 NOTE — PROGRESS NOTES
1745 - 3 ml taken out of TR band. Site clean, dry, soft. No pain, no hematoma.  1810 - 3 ml taken out of TR band. Site clean, dry, soft. No pain, no hematoma.  1840 - 3 ml taken out of TR band. Site clean, dry, soft. No pain, no hematoma.    Report given to SAUL Pope. Shown cath site and TR band.

## 2019-10-02 NOTE — PROGRESS NOTES
Monitor Summary:  SR-ST  bpm  (F) Bigem, (R) Trigem (O) PAC (O) PVC (R) Quadgeminy  5 beats VTach     0.16 / 0.08 / 0.36

## 2019-10-02 NOTE — CARE PLAN
Problem: Fluid Volume:  Goal: Will maintain balanced intake and output  Outcome: PROGRESSING AS EXPECTED   Patient voiding adequately. Strict I&O's continued. Patient educated on importance of fluid restriction.  Problem: Pain Management  Goal: Pain level will decrease to patient's comfort goal  Outcome: PROGRESSING AS EXPECTED   Patient declining pain. Rest and repositioned. Patient aware of the pain scale.

## 2019-10-02 NOTE — PROGRESS NOTES
Report received from Toshia HUGHES, patient care assumed. Patient is awake, alert and oriented x4. 2L O2, visible chest rise/fall. Denies pain, no s/s of distress. Tele monitor on. Bed low, locked position. Call light in reach.     Initial assessment completed. Updated on plan of care. No questions at this time.

## 2019-10-02 NOTE — PROGRESS NOTES
Cardiology Follow Up Progress Note    Date of Service  10/2/2019    Attending Physician  Jaci Peñaloza M.D.    Chief Complaint   SOB and ankle swelling    HPI  Ivelisse Montague is a 59 y.o. female admitted 9/27/2019 with SOB and ankle swelling over a month ago. She developed lower extremity edema.     Past medical history of hyperlipidemia, hypothyroidism, strong family history of CAD, tobacco abuse and GERD.    Interim Events  10/2/19: Patient resting in bed.  Anxious and wanting to go home.  Denies any chest pain, shortness of breath or palpitation.  Normal sinus rhythm on the monitor with occasional PVCs.  Cath site is clean soft dry and good peripheral pulses    9/30/19: patient resting in bed, lying flat. Denies any chest pain or SOB. On 2L of oxygen. Had 12bts vtach, asymptomatic. Weight down 21lbs since admission     9/28/19: patient resting in bed, complaining of shortness of breath and orthopnea. She is not able to lay flat. Denies any chest pain or dizziness. Blood pressure is elevated today at sbp 150s. ST on the monitor low 100s.     Review of Systems  Review of Systems   Constitutional: Negative for chills, diaphoresis and fever.   HENT: Negative for nosebleeds and trouble swallowing.    Respiratory: Negative for cough, chest tightness and shortness of breath.    Cardiovascular: Negative for chest pain, palpitations and leg swelling.   Gastrointestinal: Negative for abdominal distention, abdominal pain and blood in stool.   Genitourinary: Negative for hematuria.   Skin: Negative for color change.   Neurological: Negative for dizziness, syncope and numbness.   Psychiatric/Behavioral: Negative for agitation and confusion. The patient is not nervous/anxious.        Vital signs in last 24 hours  Temp:  [35.7 °C (96.3 °F)-36.6 °C (97.8 °F)] 36.6 °C (97.8 °F)  Pulse:  [66-99] 93  Resp:  [16-18] 16  BP: ()/(47-95) 102/68  SpO2:  [93 %-99 %] 99 %    Physical Exam  Physical Exam   Constitutional: She is  oriented to person, place, and time. She appears well-developed and well-nourished. No distress.   HENT:   Head: Normocephalic.   Neck: Normal range of motion. No JVD present.   Cardiovascular: Normal rate, regular rhythm, normal heart sounds and intact distal pulses.   No murmur heard.  Pulmonary/Chest: Effort normal and breath sounds normal. No respiratory distress. She has no wheezes.   Abdominal: She exhibits no distension. There is no tenderness.   Musculoskeletal: She exhibits no edema or tenderness.   Neurological: She is alert and oriented to person, place, and time.   Skin: Skin is warm and dry. No rash noted. She is not diaphoretic.   Psychiatric: Her behavior is normal. Judgment normal.   Nursing note and vitals reviewed.      Lab Review  Lab Results   Component Value Date/Time    WBC 7.8 10/02/2019 02:29 AM    RBC 5.33 10/02/2019 02:29 AM    HEMOGLOBIN 15.6 10/02/2019 02:29 AM    HEMATOCRIT 49.3 (H) 10/02/2019 02:29 AM    MCV 92.5 10/02/2019 02:29 AM    MCH 29.3 10/02/2019 02:29 AM    MCHC 31.6 (L) 10/02/2019 02:29 AM    MPV 9.4 10/02/2019 02:29 AM      Lab Results   Component Value Date/Time    SODIUM 137 10/02/2019 02:29 AM    POTASSIUM 4.3 10/02/2019 02:29 AM    CHLORIDE 100 10/02/2019 02:29 AM    CO2 26 10/02/2019 02:29 AM    GLUCOSE 96 10/02/2019 02:29 AM    BUN 40 (H) 10/02/2019 02:29 AM    CREATININE 1.03 10/02/2019 02:29 AM      Lab Results   Component Value Date/Time    ASTSGOT 24 10/01/2019 12:36 AM    ALTSGPT 23 10/01/2019 12:36 AM     Lab Results   Component Value Date/Time    TROPONINT 34 (H) 09/27/2019 10:01 PM       No results for input(s): NTPROBNP in the last 72 hours.    Cardiac Imaging and Procedures Review  EKG:    SINUS RHYTHM   PROBABLE LEFT ATRIAL ABNORMALITY   PROLONGED QT INTERVAL   No previous ECG available for comparison     Echocardiogram:    9/27/19  No prior study is available for comparison.   Severely reduced left ventricular systolic function. Left ventricular   ejection  fraction is visually estimated to be 20%.   Grade I diastolic dysfunction.  Mildly dilated right ventricle. Reduced right ventricular systolic   function.  Normal inferior vena cava size and inspiratory collapse.      Imaging  Chest X-Ray:    9/27/19  Left mid to lower lung atelectasis possible pneumonitis.       Cardiac catheterization   10/1/19  Conclusions    1. There is minimal  coronary artery disease with one vessel disease.    2. Normal LVEDP.    Recommendations    * Continue medical management and risk factor modification.    Assessment/Plan  No new Assessment & Plan notes have been filed under this hospital service since the last note was generated.  Service: Cardiology    1. New onset of systolic congestive heart failure:  - ECHO showed ef 20%  - cath showed minimal coronary artery disease with one vessel disease   - switch IV furosemide to PO 20mg qd   - strict I and O, daily stand up weight   - continue lisinopril 10mg qd and  metoprolol 25mg XL qd  - weight down 17lbs since admission     2. Hypertension:  - stable   - continue ace and bb     3. Nonsustained vt:  - goal K 4 and Mag 2    4. ISRRAEL:  - improved creatinine 0.94    Future Appointments   Date Time Provider Department Center   10/8/2019  2:00 PM BIA Jose. [facility] None         Thank you for allowing me to participate in the care of this patient.  Cardiology will sign off.     Please contact me with any questions.    JEANETTE Kraft   [facility]

## 2019-10-02 NOTE — CARE PLAN
Problem: Knowledge Deficit  Goal: Knowledge of disease process/condition, treatment plan, diagnostic tests, and medications will improve  Outcome: PROGRESSING AS EXPECTED  Note:   Patient updated on plan of care. All medications discussed with patient prior to administration. Patient verbalizes understanding, no questions at this time.        Problem: Pain Management  Goal: Pain level will decrease to patient's comfort goal  Outcome: PROGRESSING AS EXPECTED  Note:   Monitoring pain Q4H and as needed. Patient c/o RUQ abdominal. Patient vebalizes understanding of 0-10 pain scale. Pain medications given as ordered in MAR. Non-pharmacological pain interventions offered.

## 2019-10-02 NOTE — THERAPY
Physical Therapy Contact Note    Cardiac rehab consult received, pt pending cath, will follow up post finalized cardiac intervention for accurate assessment of cardiac rehab phase I needs;    Rosaline Basilio, PT, DPT Pager: [phone]

## 2019-10-02 NOTE — PROGRESS NOTES
Flu shot given - see MAR.     Patient offered PNA vaccine, refused. States will follow up with PCP for pneumonia vaccine.

## 2019-10-03 ENCOUNTER — TELEPHONE (OUTPATIENT)
Dept: CARDIOLOGY | Facility: MEDICAL CENTER | Age: 59
End: 2019-10-03

## 2019-10-03 NOTE — TELEPHONE ENCOUNTER
I s/w patient to see if she had seen a previous cardiologist before. The patient stated no and that this will be her initial visit with JEANETTE Jose on 10/08.

## 2019-10-03 NOTE — DISCHARGE SUMMARY
Discharge Summary    CHIEF COMPLAINT ON ADMISSION  Chief Complaint   Patient presents with   • Shortness of Breath   • Ankle Swelling       Reason for Admission  Sob     Admission Date  9/27/2019    CODE STATUS  Prior    HPI & HOSPITAL COURSE  Please see original H&P and consult note for specific information patient admitted due to sob due to new onset chf, started on iv lasix, echo showed EF 20% cardio consulted, patient went for cardiac cath that showed minimal coronary artery disease with one vessel disease patient to continue on cardio protective medications she is alert and oriented follows commands, denies chest pain or sob, tolerating diet, she will be dc home today and she will f/uwith PCP and with cardio as outpatient.   Patient expressed understanding of her dc plan and agreed with it all questions answered.   She was able to be weaned off o2         Therefore, she is discharged in good and stable condition to home with close outpatient follow-up.    The patient met 2-midnight criteria for an inpatient stay at the time of discharge.    Discharge Date  10/2/2019    FOLLOW UP ITEMS POST DISCHARGE  PCP  cardio    DISCHARGE DIAGNOSES  Principal Problem:    New onset of congestive heart failure (HCC) POA: Yes  Active Problems:    Elevated troponin POA: Yes    Hyperlipidemia POA: Yes    Hypothyroidism POA: Yes    GERD (gastroesophageal reflux disease) POA: Yes    Obesity POA: Yes  Resolved Problems:    * No resolved hospital problems. *      FOLLOW UP  Future Appointments   Date Time Provider Department Center   10/8/2019  2:00 PM TONY Jose [facility] None     Sly Khan M.D.  [address] [facility] [address]  [phone]    Go on 10/14/2019  Please arrive at 10:15Am for your hospital follow up at 10:30Am. Thank you.      MEDICATIONS ON DISCHARGE     Medication List      START taking these medications      Instructions   aspirin 81 MG EC tablet   Take 1 Tab by mouth every day.  Dose:  81 mg      furosemide 20 MG Tabs  Start taking on:  10/3/2019  Commonly known as:  LASIX   Take 1 Tab by mouth every day.  Dose:  20 mg     lisinopril 10 MG Tabs  Start taking on:  10/3/2019  Commonly known as:  PRINIVIL   Take 1 Tab by mouth every day.  Dose:  10 mg     metoprolol SR 25 MG Tb24  Start taking on:  10/3/2019  Commonly known as:  TOPROL XL   Take 1 Tab by mouth every day.  Dose:  25 mg     spironolactone 25 MG Tabs  Start taking on:  10/3/2019  Commonly known as:  ALDACTONE   Take 1 Tab by mouth every day.  Dose:  25 mg        CHANGE how you take these medications      Instructions   simvastatin 20 MG Tabs  What changed:    · medication strength  · how much to take  · when to take this  Commonly known as:  ZOCOR   Take 1 Tab by mouth every evening.  Dose:  20 mg        CONTINUE taking these medications      Instructions   levothyroxine 200 MCG Tabs  Commonly known as:  SYNTHROID   Take 200 mcg by mouth Every morning on an empty stomach.  Dose:  200 mcg     magnesium oxide 400 MG Tabs  Commonly known as:  MAG-OX   Take 1 Tab by mouth every 48 hours.  Dose:  400 mg     pantoprazole 40 MG Tbec  Commonly known as:  PROTONIX   Take 1 Tab by mouth every day.  Dose:  40 mg        STOP taking these medications    MEDROL (ARTURO) PO     rabeprazole 20 MG tablet  Commonly known as:  ACIPHEX            Allergies  Allergies   Allergen Reactions   • Codeine Vomiting   • Lipitor [Atorvastatin Calcium]        DIET  Healthy diet    ACTIVITY  As tolerated.  Weight bearing as tolerated    CONSULTATIONS  cardio    PROCEDURES  Cardiac cath    LABORATORY  Lab Results   Component Value Date    SODIUM 137 10/02/2019    POTASSIUM 4.3 10/02/2019    CHLORIDE 100 10/02/2019    CO2 26 10/02/2019    GLUCOSE 96 10/02/2019    BUN 40 (H) 10/02/2019    CREATININE 1.03 10/02/2019        Lab Results   Component Value Date    WBC 7.8 10/02/2019    HEMOGLOBIN 15.6 10/02/2019    HEMATOCRIT 49.3 (H) 10/02/2019    PLATELETCT 286 10/02/2019        Total  time of the discharge process exceeds 35 minutes.

## 2019-10-08 ENCOUNTER — OFFICE VISIT (OUTPATIENT)
Dept: CARDIOLOGY | Facility: MEDICAL CENTER | Age: 59
End: 2019-10-08
Payer: COMMERCIAL

## 2019-10-08 VITALS
HEART RATE: 50 BPM | HEIGHT: 62 IN | DIASTOLIC BLOOD PRESSURE: 52 MMHG | WEIGHT: 212 LBS | BODY MASS INDEX: 39.01 KG/M2 | SYSTOLIC BLOOD PRESSURE: 110 MMHG | OXYGEN SATURATION: 94 %

## 2019-10-08 DIAGNOSIS — I50.9 HEART FAILURE, NYHA CLASS 3 (HCC): ICD-10-CM

## 2019-10-08 DIAGNOSIS — I10 HTN (HYPERTENSION), MALIGNANT: ICD-10-CM

## 2019-10-08 DIAGNOSIS — I25.10 CORONARY ARTERY DISEASE INVOLVING NATIVE CORONARY ARTERY OF NATIVE HEART WITHOUT ANGINA PECTORIS: ICD-10-CM

## 2019-10-08 DIAGNOSIS — N17.9 AKI (ACUTE KIDNEY INJURY) (HCC): ICD-10-CM

## 2019-10-08 DIAGNOSIS — Z59.48 LACK OF ACCESS TO ADEQUATE FOOD: ICD-10-CM

## 2019-10-08 DIAGNOSIS — R06.02 SOB (SHORTNESS OF BREATH): ICD-10-CM

## 2019-10-08 DIAGNOSIS — I50.20 ACC/AHA STAGE C SYSTOLIC HEART FAILURE (HCC): ICD-10-CM

## 2019-10-08 DIAGNOSIS — I42.8 NON-ISCHEMIC CARDIOMYOPATHY (HCC): ICD-10-CM

## 2019-10-08 PROCEDURE — 99214 OFFICE O/P EST MOD 30 MIN: CPT | Mod: 25 | Performed by: NURSE PRACTITIONER

## 2019-10-08 PROCEDURE — 94618 PULMONARY STRESS TESTING: CPT | Performed by: NURSE PRACTITIONER

## 2019-10-08 PROCEDURE — 93000 ELECTROCARDIOGRAM COMPLETE: CPT | Performed by: INTERNAL MEDICINE

## 2019-10-08 SDOH — ECONOMIC STABILITY - FOOD INSECURITY: OTHER SPECIFIED LACK OF ADEQUATE FOOD: Z59.48

## 2019-10-08 ASSESSMENT — ENCOUNTER SYMPTOMS
DIZZINESS: 0
FEVER: 0
PND: 1
PALPITATIONS: 0
ABDOMINAL PAIN: 0
CLAUDICATION: 0
ORTHOPNEA: 0
COUGH: 0
MYALGIAS: 0
SHORTNESS OF BREATH: 1

## 2019-10-08 ASSESSMENT — 6 MINUTE WALK TEST (6MWT): TOTAL DISTANCE WALKED (METERS): 314

## 2019-10-08 ASSESSMENT — MINNESOTA LIVING WITH HEART FAILURE QUESTIONNAIRE (MLHF)
GIVING YOU SIDE EFFECTS FROM TREATMENTS: 0
HAVING TO SIT OR LIE DOWN DURING THE DAY: 3
MAKING YOU FEEL DEPRESSED: 1
TIRED, FATIGUED OR LOW ON ENERGY: 5
COSTING YOU MONEY FOR MEDICAL CARE: 1
DIFFICULTY GOING AWAY FROM HOME: 4
MAKING YOU WORRY: 1
MAKING YOU SHORT OF BREATH: 5
WORKING AROUND THE HOUSE OR YARD DIFFICULT: 4
WALKING ABOUT OR CLIMBING STAIRS DIFFICULT: 4
SWELLING IN ANKLES OR LEGS: 5
FEELING LIKE A BURDEN TO FAMILY AND FRIENDS: 0
TOTAL_SCORE: 66
DIFFICULTY TO CONCENTRATE OR REMEMBERING THINGS: 0
DIFFICULTY WITH RECREATIONAL PASTIMES, SPORTS, HOBBIES: 5
EATING LESS FOODS YOU LIKE: 2
MAKING YOU STAY IN A HOSPITAL: 5
DIFFICULTY WITH SEXUAL ACTIVITIES: 5
DIFFICULTY SLEEPING WELL AT NIGHT: 5
DIFFICULTY SOCIALIZING WITH FAMILY OR FRIENDS: 3
LOSS OF SELF CONTROL IN YOUR LIFE: 3
DIFFICULTY WORKING TO EARN A LIVING: 5

## 2019-10-08 NOTE — LETTER
October 8, 2019    To Whom It May Concern:         This is confirmation that Ivelisse PINO Clari attended her scheduled appointment with TONY Jose on 10/08/19. She is currently to remain off of work until 1/31/2020 until further notice.         If you have any questions please do not hesitate to call me at the phone number listed below.    Sincerely,          BIA Jose.  [phone]

## 2019-10-08 NOTE — PROGRESS NOTES
Chief Complaint   Patient presents with   • Congestive Heart Failure     NEW HEART FAILURE       Subjective:   Ivelisse Montague is a 59 y.o. female who presents today for follow-up on her heart failure with her , Naif.    New patient to the office.  She had a recent hospitalization from 9/27/2019 through 10-19.  Patient came reporting lower extremity edema and shortness of breath.  She was found to have an LVEF of 20%, was sent for coronary angiogram which showed only mild CAD.  Patient was also treated for hypertension.  Patient was diuresed, and sent home on medical therapy.    She does report significant medical history with her family positive for heart attacks.    For her symptoms, she does report fatigue, occasional PND and shortness of breath with ADLs and exertion.  She denies chest pain, palpitations, shortness breath at rest, orthopnea, edema or dizziness/lightheadedness.    Her home weights have been ranging between 206-208 pounds.    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    Patient is concerned about affording food, states she is trying to work on her sodium intake.    Initial 6 minute walk test, patient was able to complete 314 m during her 6 minute walk test.  Her O2 saturation at baseline was 94 % and at the end of the test, the O2 saturation was 99 %.  He reported level 5 of dyspnea on Juancarlos scale.    MLWHF score 66    Additonally, patient has the following medical problems:    -Hyperlipidemia    -GERD    -Hypothyroidism    -Chronic back pain    Past Medical History:   Diagnosis Date   • Back pain    • CAD (coronary artery disease)    • CHF (congestive heart failure) ([facility])    • GERD (gastroesophageal reflux disease)    • Hyperlipidemia    • Hypertension    • RLS (restless legs syndrome)    • Thyroid disease      Past Surgical History:   Procedure Laterality Date   • KNEE REPLACEMENT, TOTAL Bilateral    •  LUMBAR LAMINECTOMY DISKECTOMY     • WRIST FUSION Left     x2   • ZZZ CARDIAC CATH       Family History   Problem Relation Age of Onset   • Diabetes Mother    • Lung Disease Mother    • Cancer Mother         CLL   • Osteoporosis Mother    • Asthma Mother    • Heart Attack Father    • Thyroid Sister    • Heart Disease Brother    • Diabetes Brother    • Heart Attack Brother    • Lung Disease Brother    • Bladder cancer Brother    • Heart Disease Brother    • Heart Attack Brother      Social History     Socioeconomic History   • Marital status: Single     Spouse name: Not on file   • Number of children: Not on file   • Years of education: Not on file   • Highest education level: Not on file   Occupational History   • Not on file   Social Needs   • Financial resource strain: Not on file   • Food insecurity:     Worry: Not on file     Inability: Not on file   • Transportation needs:     Medical: Not on file     Non-medical: Not on file   Tobacco Use   • Smoking status: Former Smoker     Packs/day: 1.00     Years: 40.00     Pack years: 40.00     Types: Cigarettes     Last attempt to quit: 2008     Years since quittin.4   • Smokeless tobacco: Never Used   • Tobacco comment: 1-1.5 PPD   Substance and Sexual Activity   • Alcohol use: No   • Drug use: No   • Sexual activity: Not on file   Lifestyle   • Physical activity:     Days per week: Not on file     Minutes per session: Not on file   • Stress: Not on file   Relationships   • Social connections:     Talks on phone: Not on file     Gets together: Not on file     Attends Religion service: Not on file     Active member of club or organization: Not on file     Attends meetings of clubs or organizations: Not on file     Relationship status: Not on file   • Intimate partner violence:     Fear of current or ex partner: Not on file     Emotionally abused: Not on file     Physically abused: Not on file     Forced sexual activity: Not on file   Other Topics Concern   •  "Not on file   Social History Narrative   • Not on file     Allergies   Allergen Reactions   • Codeine Vomiting   • Lipitor [Atorvastatin Calcium]      Outpatient Encounter Medications as of 10/8/2019   Medication Sig Dispense Refill   • MAGNESIUM PO Take 1 Tab by mouth every day.     • simvastatin (ZOCOR) 20 MG Tab Take 1 Tab by mouth every evening. 30 Tab 0   • furosemide (LASIX) 20 MG Tab Take 1 Tab by mouth every day. 60 Tab 0   • lisinopril (PRINIVIL) 10 MG Tab Take 1 Tab by mouth every day. 30 Tab 0   • metoprolol SR (TOPROL XL) 25 MG TABLET SR 24 HR Take 1 Tab by mouth every day. 30 Tab 0   • spironolactone (ALDACTONE) 25 MG Tab Take 1 Tab by mouth every day. 30 Tab 0   • aspirin 81 MG EC tablet Take 1 Tab by mouth every day. 30 Tab 0   • levothyroxine (SYNTHROID) 200 MCG Tab Take 200 mcg by mouth Every morning on an empty stomach.     • [DISCONTINUED] pantoprazole (PROTONIX) 40 MG Tablet Delayed Response Take 1 Tab by mouth every day. (Patient not taking: Reported on 10/8/2019) 30 Tab 0   • [DISCONTINUED] magnesium oxide (MAG-OX) 400 MG Tab Take 1 Tab by mouth every 48 hours. 30 Tab 0     No facility-administered encounter medications on file as of 10/8/2019.      Review of Systems   Constitutional: Positive for malaise/fatigue. Negative for fever.   Respiratory: Positive for shortness of breath. Negative for cough.    Cardiovascular: Positive for PND (occ). Negative for chest pain, palpitations, orthopnea, claudication and leg swelling.   Gastrointestinal: Negative for abdominal pain.   Musculoskeletal: Negative for myalgias.   Neurological: Negative for dizziness.   All other systems reviewed and are negative.       Objective:   /52 (BP Location: Left arm, Patient Position: Sitting, BP Cuff Size: Adult)   Pulse (!) 50   Ht 1.575 m (5' 2\")   Wt 96.2 kg (212 lb)   SpO2 94%   BMI 38.78 kg/m²     Physical Exam   Constitutional: She is oriented to person, place, and time. She appears well-developed " and well-nourished.   HENT:   Head: Normocephalic and atraumatic.   Eyes: Pupils are equal, round, and reactive to light. EOM are normal.   Neck: Normal range of motion. Neck supple. No JVD present.   Cardiovascular: Normal rate. An irregular rhythm present.  Occasional extrasystoles are present.   No murmur heard.  Pulmonary/Chest: Effort normal and breath sounds normal. No respiratory distress. She has no wheezes. She has no rales.   Abdominal: Soft. Bowel sounds are normal.   Musculoskeletal: She exhibits no edema.   Neurological: She is alert and oriented to person, place, and time.   Skin: Skin is warm and dry.   Right wrist cath site-no erosion, drainage or erythema   Psychiatric: She has a normal mood and affect. Her behavior is normal.   Vitals reviewed.    No results found for: CHOLSTRLTOT, LDL, HDL, TRIGLYCERIDE    Lab Results   Component Value Date/Time    SODIUM 137 10/02/2019 02:29 AM    POTASSIUM 4.3 10/02/2019 02:29 AM    CHLORIDE 100 10/02/2019 02:29 AM    CO2 26 10/02/2019 02:29 AM    GLUCOSE 96 10/02/2019 02:29 AM    BUN 40 (H) 10/02/2019 02:29 AM    CREATININE 1.03 10/02/2019 02:29 AM     Lab Results   Component Value Date/Time    ALKPHOSPHAT 60 10/01/2019 12:36 AM    ASTSGOT 24 10/01/2019 12:36 AM    ALTSGPT 23 10/01/2019 12:36 AM    TBILIRUBIN 0.9 10/01/2019 12:36 AM      Transthoracic Echo Report 9/27/2019  No prior study is available for comparison.   Severely reduced left ventricular systolic function. Left ventricular   ejection fraction is visually estimated to be 20%.   Grade I diastolic dysfunction.  Mildly dilated right ventricle. Reduced right ventricular systolic   function.  Normal inferior vena cava size and inspiratory collapse.    Heart cath 10/1/2019  Conclusions    1. There is minimal  coronary artery disease with one vessel disease.    2. Normal LVEDP.    Recommendations    * Continue medical management and risk factor modification.     Assessment:     1. ACC/AHA stage C  systolic heart failure (HCC)  Basic Metabolic Panel    MAGNESIUM    EKG    EKG   2. Heart failure, NYHA class 3 (HCC)  Basic Metabolic Panel    MAGNESIUM    EKG    EKG   3. SOB (shortness of breath)  Basic Metabolic Panel    MAGNESIUM    EKG    EKG   4. ISRRAEL (acute kidney injury) ([facility])  Basic Metabolic Panel    MAGNESIUM   5. Coronary artery disease involving native coronary artery of native heart without angina pectoris     6. HTN (hypertension), malignant     7. Non-ischemic cardiomyopathy (HCC)     8. Lack of access to adequate food         Medical Decision Making:  Today's Assessment / Status / Plan:   1. HFrEF, Stage C, Class 3, LVEF 20%: Based on physical examination findings, patient is euvolemic. No JVD, lungs are clear to auscultation, no pitting edema in bilateral lower extremities, no ascites.  -EKG done in the office today shows sinus rhythm 68 with PAC  -Discussed with patient and  that I will not make any changes at this time to her medical regimen  -Continue lisinopril 10 mg daily  -Continue furosemide 20 mg daily  -Continue Toprol-XL 25 mg daily  -Continue spironolactone 25 mg daily  -Okay to resume OTC magnesium daily (patient reports she did not  the magnesium as ordered from the hospital)  -Obtain a magnesium and BMP before next visit in 2 weeks  -Reinforced s/sx of worsening heart failure with patient and weight monitoring. Pt verbalizes understanding. Pt to call office or RTC if present.     2.  Hypertension: Stable  -Continue recommendations per above    3.  CAD/hyperlipidemia:   -Patient has an allergy to atorvastatin  -Continue simvastatin 20 mg daily (patient reports she is been tolerating simvastatin)  -Continue aspirin 81 mg daily    4.  Lack of access to adequate food:  -Patient given food prescription for the year    FU in clinic in 2 weeks with HF MD. Sooner if needed.    Patient verbalizes understanding and agrees with the plan of care.     Collaborating MD: YESSICA Roman,  MD

## 2019-10-09 LAB — EKG IMPRESSION: NORMAL

## 2019-10-24 LAB
BUN SERPL-MCNC: 30 MG/DL (ref 8–27)
BUN/CREAT SERPL: 21 (ref 12–28)
CALCIUM SERPL-MCNC: 9.9 MG/DL (ref 8.7–10.3)
CHLORIDE SERPL-SCNC: 101 MMOL/L (ref 96–106)
CO2 SERPL-SCNC: 22 MMOL/L (ref 20–29)
CREAT SERPL-MCNC: 1.42 MG/DL (ref 0.57–1)
GLUCOSE SERPL-MCNC: 117 MG/DL (ref 65–99)
MAGNESIUM SERPL-MCNC: 2.2 MG/DL (ref 1.6–2.3)
POTASSIUM SERPL-SCNC: 5.1 MMOL/L (ref 3.5–5.2)
SODIUM SERPL-SCNC: 139 MMOL/L (ref 134–144)

## 2019-10-31 ENCOUNTER — OFFICE VISIT (OUTPATIENT)
Dept: CARDIOLOGY | Facility: MEDICAL CENTER | Age: 59
End: 2019-10-31
Payer: COMMERCIAL

## 2019-10-31 ENCOUNTER — NON-PROVIDER VISIT (OUTPATIENT)
Dept: CARDIOLOGY | Facility: MEDICAL CENTER | Age: 59
End: 2019-10-31
Payer: COMMERCIAL

## 2019-10-31 VITALS
DIASTOLIC BLOOD PRESSURE: 58 MMHG | OXYGEN SATURATION: 97 % | HEART RATE: 52 BPM | WEIGHT: 213.4 LBS | SYSTOLIC BLOOD PRESSURE: 116 MMHG | HEIGHT: 62 IN | BODY MASS INDEX: 39.27 KG/M2

## 2019-10-31 DIAGNOSIS — I25.10 CORONARY ARTERY DISEASE INVOLVING NATIVE CORONARY ARTERY OF NATIVE HEART WITHOUT ANGINA PECTORIS: ICD-10-CM

## 2019-10-31 DIAGNOSIS — I42.8 NON-ISCHEMIC CARDIOMYOPATHY (HCC): ICD-10-CM

## 2019-10-31 DIAGNOSIS — I50.9 CONGESTIVE HEART FAILURE, NYHA CLASS 2 AND ACC/AHA STAGE C (HCC): ICD-10-CM

## 2019-10-31 DIAGNOSIS — Z02.9 ADMINISTRATIVE ENCOUNTER: ICD-10-CM

## 2019-10-31 DIAGNOSIS — I49.1 ATRIAL PREMATURE DEPOLARIZATION: ICD-10-CM

## 2019-10-31 DIAGNOSIS — I50.20 ACC/AHA STAGE C SYSTOLIC HEART FAILURE (HCC): ICD-10-CM

## 2019-10-31 PROCEDURE — 99214 OFFICE O/P EST MOD 30 MIN: CPT | Performed by: INTERNAL MEDICINE

## 2019-10-31 NOTE — PATIENT INSTRUCTIONS
Reduce furosemide to 20 mg one half tab daily    Reduce spironolactone to 25 mg one half tab daily    BMP prior to follow-up in 1 week    Holter monitor

## 2019-10-31 NOTE — PROGRESS NOTES
Chief Complaint   Patient presents with   • Congestive Heart Failure       Subjective:   Ivelisse Montague is a 59 y.o. female who presents today for follow-up in heart failure clinic with heart failure and a reduced ejection fraction.  She also has nonobstructive coronary artery disease and dyslipidemia.    She has dyspnea on exertion at about a half a block.  However, she was able to complete over 300 m on a 6-minute walk test.  She is had no PND orthopnea.  She continues have some edema though it is significantly improved.  She feels lightheaded when her blood pressure is low.  She has had no palpitation or chest discomfort.    She brings in a record of her blood pressures and they run from approximately /40-70.        Past Medical History:   Diagnosis Date   • Back pain    • CAD (coronary artery disease)    • CHF (congestive heart failure) (HCC)    • GERD (gastroesophageal reflux disease)    • Hyperlipidemia    • Hypertension    • RLS (restless legs syndrome)    • Thyroid disease      Past Surgical History:   Procedure Laterality Date   • KNEE REPLACEMENT, TOTAL Bilateral    • LUMBAR LAMINECTOMY DISKECTOMY     • WRIST FUSION Left     x2   • ZZZ CARDIAC CATH       Family History   Problem Relation Age of Onset   • Diabetes Mother    • Lung Disease Mother    • Cancer Mother         CLL   • Osteoporosis Mother    • Asthma Mother    • Heart Attack Father    • Thyroid Sister    • Heart Disease Brother    • Diabetes Brother    • Heart Attack Brother    • Lung Disease Brother    • Bladder cancer Brother    • Heart Disease Brother    • Heart Attack Brother      Social History     Socioeconomic History   • Marital status: Single     Spouse name: Not on file   • Number of children: Not on file   • Years of education: Not on file   • Highest education level: Not on file   Occupational History   • Not on file   Social Needs   • Financial resource strain: Not on file   • Food insecurity:     Worry: Not on file      Inability: Not on file   • Transportation needs:     Medical: Not on file     Non-medical: Not on file   Tobacco Use   • Smoking status: Former Smoker     Packs/day: 1.00     Years: 40.00     Pack years: 40.00     Types: Cigarettes     Last attempt to quit: 2008     Years since quittin.5   • Smokeless tobacco: Never Used   • Tobacco comment: 1-1.5 PPD   Substance and Sexual Activity   • Alcohol use: No   • Drug use: No   • Sexual activity: Not on file   Lifestyle   • Physical activity:     Days per week: Not on file     Minutes per session: Not on file   • Stress: Not on file   Relationships   • Social connections:     Talks on phone: Not on file     Gets together: Not on file     Attends Hinduism service: Not on file     Active member of club or organization: Not on file     Attends meetings of clubs or organizations: Not on file     Relationship status: Not on file   • Intimate partner violence:     Fear of current or ex partner: Not on file     Emotionally abused: Not on file     Physically abused: Not on file     Forced sexual activity: Not on file   Other Topics Concern   • Not on file   Social History Narrative   • Not on file     Allergies   Allergen Reactions   • Codeine Vomiting   • Lipitor [Atorvastatin Calcium]      Outpatient Encounter Medications as of 10/31/2019   Medication Sig Dispense Refill   • MAGNESIUM-OXIDE 400 (241.3 Mg) MG Tab tablet Take 400 mg by mouth every 48 hours.  0   • simvastatin (ZOCOR) 20 MG Tab Take 1 Tab by mouth every evening. 30 Tab 0   • furosemide (LASIX) 20 MG Tab Take 1 Tab by mouth every day. 60 Tab 0   • lisinopril (PRINIVIL) 10 MG Tab Take 1 Tab by mouth every day. 30 Tab 0   • metoprolol SR (TOPROL XL) 25 MG TABLET SR 24 HR Take 1 Tab by mouth every day. 30 Tab 0   • spironolactone (ALDACTONE) 25 MG Tab Take 1 Tab by mouth every day. 30 Tab 0   • aspirin 81 MG EC tablet Take 1 Tab by mouth every day. 30 Tab 0   • levothyroxine (SYNTHROID) 200 MCG Tab Take 200  "mcg by mouth Every morning on an empty stomach.     • MAGNESIUM PO Take 1 Tab by mouth every day.       No facility-administered encounter medications on file as of 10/31/2019.      ROS     Objective:   /58 (BP Location: Left arm, Patient Position: Sitting, BP Cuff Size: Adult)   Pulse (!) 52   Ht 1.575 m (5' 2\")   Wt 96.8 kg (213 lb 6.4 oz)   SpO2 97%   BMI 39.03 kg/m²     Physical Exam   Constitutional: She appears well-developed and well-nourished.   Neck: No JVD present.   Cardiovascular: Normal rate. An irregular rhythm present. Frequent extrasystoles are present.   No murmur heard.  Pulmonary/Chest: Effort normal and breath sounds normal. She has no rales.   Abdominal: Soft. There is no tenderness.   Musculoskeletal: She exhibits no edema.     EKG from October 8, reviewed by myself: This shows a normal sinus rhythm with frequent PACs.  Nonspecific ST-T wave abnormalities were also present.  No results found for: CHOLSTRLTOT, LDL, HDL, TRIGLYCERIDE    Lab Results   Component Value Date/Time    SODIUM 139 10/23/2019 09:18 AM    SODIUM 137 10/02/2019 02:29 AM    POTASSIUM 5.1 10/23/2019 09:18 AM    POTASSIUM 4.3 10/02/2019 02:29 AM    CHLORIDE 101 10/23/2019 09:18 AM    CHLORIDE 100 10/02/2019 02:29 AM    CO2 22 10/23/2019 09:18 AM    CO2 26 10/02/2019 02:29 AM    GLUCOSE 117 (H) 10/23/2019 09:18 AM    GLUCOSE 96 10/02/2019 02:29 AM    BUN 30 (H) 10/23/2019 09:18 AM    BUN 40 (H) 10/02/2019 02:29 AM    CREATININE 1.42 (H) 10/23/2019 09:18 AM    CREATININE 1.03 10/02/2019 02:29 AM    BUNCREATRAT 21 10/23/2019 09:18 AM     Lab Results   Component Value Date/Time    ALKPHOSPHAT 60 10/01/2019 12:36 AM    ASTSGOT 24 10/01/2019 12:36 AM    ALTSGPT 23 10/01/2019 12:36 AM    TBILIRUBIN 0.9 10/01/2019 12:36 AM      No results found for: BNPBTYPENAT     Echocardiography Laboratory    CONCLUSIONS  No prior study is available for comparison.   Severely reduced left ventricular systolic function. Left ventricular "   ejection fraction is visually estimated to be 20%.   Grade I diastolic dysfunction.  Mildly dilated right ventricle. Reduced right ventricular systolic   function.  Normal inferior vena cava size and inspiratory collapse.    LAZ MONTAGUE  Exam Date:         09/27/2019    Assessment:     1. ACC/AHA stage C systolic heart failure (HCC)     2. Congestive heart failure, NYHA class 2 and ACC/AHA stage C (HCC)     3. Coronary artery disease involving native coronary artery of native heart without angina pectoris     4. Non-ischemic cardiomyopathy (HCC)         Medical Decision Making:  Today's Assessment / Status / Plan:     Ms. Montague is having difficulty with hypotension and lightheadedness.  She does have frequent PACs on examination and is unclear whether or not this is affecting her cardiac output.  We will obtain a 24-hour Holter monitor to further evaluate her ectopy and see if she has significant PVCs.    Given her hypotension will reduce furosemide to 10 mg daily and spironolactone to 12.5 mg daily.  She will follow-up in a week with a BMP.  If her blood pressure increases we may be able to further adjust her medical therapy.

## 2019-11-01 DIAGNOSIS — I50.9 NEW ONSET OF CONGESTIVE HEART FAILURE (HCC): ICD-10-CM

## 2019-11-01 DIAGNOSIS — I50.9 CONGESTIVE HEART FAILURE, NYHA CLASS 2 AND ACC/AHA STAGE C (HCC): ICD-10-CM

## 2019-11-01 DIAGNOSIS — E78.2 MIXED HYPERLIPIDEMIA: ICD-10-CM

## 2019-11-01 RX ORDER — METOPROLOL SUCCINATE 25 MG/1
TABLET, EXTENDED RELEASE ORAL
Qty: 90 TAB | Refills: 3 | Status: SHIPPED | OUTPATIENT
Start: 2019-11-01 | End: 2019-11-26

## 2019-11-01 RX ORDER — SIMVASTATIN 20 MG
TABLET ORAL
Qty: 90 TAB | Refills: 3 | Status: SHIPPED | OUTPATIENT
Start: 2019-11-01 | End: 2021-04-12 | Stop reason: SDUPTHER

## 2019-11-01 RX ORDER — SPIRONOLACTONE 25 MG/1
TABLET ORAL
Qty: 90 TAB | Refills: 3 | Status: SHIPPED | OUTPATIENT
Start: 2019-11-01 | End: 2019-12-20

## 2019-11-01 RX ORDER — LISINOPRIL 10 MG/1
TABLET ORAL
Qty: 90 TAB | Refills: 3 | Status: SHIPPED | OUTPATIENT
Start: 2019-11-01 | End: 2020-10-12

## 2019-11-13 ENCOUNTER — NON-PROVIDER VISIT (OUTPATIENT)
Dept: CARDIOLOGY | Facility: MEDICAL CENTER | Age: 59
End: 2019-11-13
Payer: COMMERCIAL

## 2019-11-13 DIAGNOSIS — I49.3 PVC (PREMATURE VENTRICULAR CONTRACTION): ICD-10-CM

## 2019-11-13 DIAGNOSIS — I49.1 ATRIAL PREMATURE DEPOLARIZATION: ICD-10-CM

## 2019-11-13 PROCEDURE — 93224 XTRNL ECG REC UP TO 48 HRS: CPT | Performed by: INTERNAL MEDICINE

## 2019-11-14 DIAGNOSIS — I49.1 ATRIAL PREMATURE DEPOLARIZATION: ICD-10-CM

## 2019-11-14 LAB — EKG IMPRESSION: NORMAL

## 2019-11-22 ENCOUNTER — TELEPHONE (OUTPATIENT)
Dept: CARDIOLOGY | Facility: MEDICAL CENTER | Age: 59
End: 2019-11-22

## 2019-11-22 NOTE — TELEPHONE ENCOUNTER
Spoke with pt about doing labs, pt stated that they got them this mronig at [facility]. Will call Monday morning for those results .

## 2019-11-23 LAB
BUN SERPL-MCNC: 20 MG/DL (ref 8–27)
BUN/CREAT SERPL: 17 (ref 12–28)
CALCIUM SERPL-MCNC: 9.4 MG/DL (ref 8.7–10.3)
CHLORIDE SERPL-SCNC: 104 MMOL/L (ref 96–106)
CO2 SERPL-SCNC: 26 MMOL/L (ref 20–29)
CREAT SERPL-MCNC: 1.18 MG/DL (ref 0.57–1)
GLUCOSE SERPL-MCNC: 104 MG/DL (ref 65–99)
POTASSIUM SERPL-SCNC: 4.5 MMOL/L (ref 3.5–5.2)
SODIUM SERPL-SCNC: 144 MMOL/L (ref 134–144)

## 2019-11-26 ENCOUNTER — OFFICE VISIT (OUTPATIENT)
Dept: CARDIOLOGY | Facility: MEDICAL CENTER | Age: 59
End: 2019-11-26
Payer: COMMERCIAL

## 2019-11-26 VITALS
BODY MASS INDEX: 39.82 KG/M2 | WEIGHT: 216.4 LBS | SYSTOLIC BLOOD PRESSURE: 104 MMHG | OXYGEN SATURATION: 96 % | DIASTOLIC BLOOD PRESSURE: 64 MMHG | HEART RATE: 60 BPM | HEIGHT: 62 IN

## 2019-11-26 DIAGNOSIS — I50.20 ACC/AHA STAGE C SYSTOLIC HEART FAILURE (HCC): ICD-10-CM

## 2019-11-26 DIAGNOSIS — E78.5 DYSLIPIDEMIA: ICD-10-CM

## 2019-11-26 DIAGNOSIS — I50.9 CONGESTIVE HEART FAILURE, NYHA CLASS 2 AND ACC/AHA STAGE C (HCC): ICD-10-CM

## 2019-11-26 DIAGNOSIS — I25.10 CORONARY ARTERY DISEASE INVOLVING NATIVE CORONARY ARTERY OF NATIVE HEART WITHOUT ANGINA PECTORIS: ICD-10-CM

## 2019-11-26 DIAGNOSIS — I42.8 NON-ISCHEMIC CARDIOMYOPATHY (HCC): ICD-10-CM

## 2019-11-26 PROCEDURE — 99214 OFFICE O/P EST MOD 30 MIN: CPT | Performed by: INTERNAL MEDICINE

## 2019-11-26 RX ORDER — METOPROLOL SUCCINATE 50 MG/1
50 TABLET, EXTENDED RELEASE ORAL DAILY
Qty: 30 TAB | Refills: 11 | Status: SHIPPED | OUTPATIENT
Start: 2019-11-26 | End: 2020-01-20

## 2019-11-26 NOTE — PROGRESS NOTES
Chief Complaint   Patient presents with   • Congestive Heart Failure       Subjective:   Ivelisse Montague is a 59 y.o. female who presents today for follow-up in heart failure clinic with heart failure and a reduced ejection fraction.  She also has nonobstructive coronary artery disease and dyslipidemia.    Yesterday she had some lightheadedness and her blood pressure was 96/57 and her device recorded a heart rate of 37.  She has some associated nausea with her lightheadedness and this resolved after about 30 minutes when she laid down.    She has dyspnea on exertion at a block or less.  She is had no PND or orthopnea.  She was having some edema last week which improved with one extra half of Lasix on one occasion.  She is noted no chest discomfort or palpitations.        Past Medical History:   Diagnosis Date   • Back pain    • CAD (coronary artery disease)    • CHF (congestive heart failure) (HCC)    • GERD (gastroesophageal reflux disease)    • Hyperlipidemia    • Hypertension    • RLS (restless legs syndrome)    • Thyroid disease      Past Surgical History:   Procedure Laterality Date   • KNEE REPLACEMENT, TOTAL Bilateral    • LUMBAR LAMINECTOMY DISKECTOMY     • WRIST FUSION Left     x2   • ZZZ CARDIAC CATH       Family History   Problem Relation Age of Onset   • Diabetes Mother    • Lung Disease Mother    • Cancer Mother         CLL   • Osteoporosis Mother    • Asthma Mother    • Heart Attack Father    • Thyroid Sister    • Heart Disease Brother    • Diabetes Brother    • Heart Attack Brother    • Lung Disease Brother    • Bladder cancer Brother    • Heart Disease Brother    • Heart Attack Brother      Social History     Socioeconomic History   • Marital status: Single     Spouse name: Not on file   • Number of children: Not on file   • Years of education: Not on file   • Highest education level: Not on file   Occupational History   • Not on file   Social Needs   • Financial resource strain: Not on file   •  Food insecurity:     Worry: Not on file     Inability: Not on file   • Transportation needs:     Medical: Not on file     Non-medical: Not on file   Tobacco Use   • Smoking status: Former Smoker     Packs/day: 1.00     Years: 40.00     Pack years: 40.00     Types: Cigarettes     Last attempt to quit: 2008     Years since quittin.6   • Smokeless tobacco: Never Used   • Tobacco comment: 1-1.5 PPD   Substance and Sexual Activity   • Alcohol use: No   • Drug use: No   • Sexual activity: Not on file   Lifestyle   • Physical activity:     Days per week: Not on file     Minutes per session: Not on file   • Stress: Not on file   Relationships   • Social connections:     Talks on phone: Not on file     Gets together: Not on file     Attends [employer] service: Not on file     Active member of club or organization: Not on file     Attends meetings of clubs or organizations: Not on file     Relationship status: Not on file   • Intimate partner violence:     Fear of current or ex partner: Not on file     Emotionally abused: Not on file     Physically abused: Not on file     Forced sexual activity: Not on file   Other Topics Concern   • Not on file   Social History Narrative   • Not on file     Allergies   Allergen Reactions   • Codeine Vomiting   • Lipitor [Atorvastatin Calcium]      Outpatient Encounter Medications as of 2019   Medication Sig Dispense Refill   • spironolactone (ALDACTONE) 25 MG Tab TAKE 1 TABLET BY MOUTH EVERY DAY 90 Tab 3   • lisinopril (PRINIVIL) 10 MG Tab TAKE 1 TABLET BY MOUTH EVERY DAY 90 Tab 3   • simvastatin (ZOCOR) 20 MG Tab TAKE 1 TABLET BY MOUTH EVERY DAY IN THE EVENING 90 Tab 3   • metoprolol SR (TOPROL XL) 25 MG TABLET SR 24 HR TAKE 1 TABLET BY MOUTH EVERY DAY 90 Tab 3   • MAGNESIUM-OXIDE 400 (241.3 Mg) MG Tab tablet Take 400 mg by mouth every 48 hours.  0   • furosemide (LASIX) 20 MG Tab Take 1 Tab by mouth every day. 60 Tab 0   • aspirin 81 MG EC tablet Take 1 Tab by mouth every  "day. 30 Tab 0   • levothyroxine (SYNTHROID) 200 MCG Tab Take 200 mcg by mouth Every morning on an empty stomach.     • MAGNESIUM PO Take 1 Tab by mouth every day.       No facility-administered encounter medications on file as of 11/26/2019.      ROS       Objective:   /64 (BP Location: Right arm, Patient Position: Sitting, BP Cuff Size: Adult)   Pulse 60   Ht 1.575 m (5' 2\")   Wt 98.2 kg (216 lb 6.4 oz)   SpO2 96%   BMI 39.58 kg/m²     Physical Exam   Constitutional: She appears well-developed and well-nourished.   Neck: No JVD present.   Cardiovascular: Normal rate. An irregular rhythm present. Frequent extrasystoles are present.   No murmur heard.  Pulmonary/Chest: Effort normal and breath sounds normal. She has no rales.   Abdominal: Soft. There is no tenderness.   Musculoskeletal:         General: No edema.     EKG from October 8, reviewed by myself: This shows a normal sinus rhythm with frequent PACs.  Nonspecific ST-T wave abnormalities were also present.  No results found for: CHOLSTRLTOT, LDL, HDL, TRIGLYCERIDE    Lab Results   Component Value Date/Time    SODIUM 144 11/22/2019 06:42 AM    SODIUM 137 10/02/2019 02:29 AM    POTASSIUM 4.5 11/22/2019 06:42 AM    POTASSIUM 4.3 10/02/2019 02:29 AM    CHLORIDE 104 11/22/2019 06:42 AM    CHLORIDE 100 10/02/2019 02:29 AM    CO2 26 11/22/2019 06:42 AM    CO2 26 10/02/2019 02:29 AM    GLUCOSE 104 (H) 11/22/2019 06:42 AM    GLUCOSE 96 10/02/2019 02:29 AM    BUN 20 11/22/2019 06:42 AM    BUN 40 (H) 10/02/2019 02:29 AM    CREATININE 1.18 (H) 11/22/2019 06:42 AM    CREATININE 1.03 10/02/2019 02:29 AM    BUNCREATRAT 17 11/22/2019 06:42 AM     Lab Results   Component Value Date/Time    ALKPHOSPHAT 60 10/01/2019 12:36 AM    ASTSGOT 24 10/01/2019 12:36 AM    ALTSGPT 23 10/01/2019 12:36 AM    TBILIRUBIN 0.9 10/01/2019 12:36 AM      No results found for: BNPBTYPENAT     Echocardiography Laboratory    CONCLUSIONS  No prior study is available for comparison. "   Severely reduced left ventricular systolic function. Left ventricular   ejection fraction is visually estimated to be 20%.   Grade I diastolic dysfunction.  Mildly dilated right ventricle. Reduced right ventricular systolic   function.  Normal inferior vena cava size and inspiratory collapse.    LAZ NEAL  Exam Date:         09/27/2019      Interpretive Statements   *   Monitoring started at 10:43 AM and continued for 24 hours. Overall rhythm   was Sinus. The average heart rate was 74 BPM.   The minimum heart rate was 47 BPM, occurring at 7:46:41 AM. The maximum   heart rate was 124 BPM, occurring at 10:50:10   AM. The longest R-R interval was 1.5 seconds occurring at 10:12:13 AM.   *   Ventricular ectopic activity consisted of one run, 4 triplets, 55   couplets, 6,523 single multifocal PVCs, 15 interpolated PVCs,   382 single endiastolic beats, 1,986 in bigeminy, and 6,026 in trigeminy. The   longest and fastest ventricular run occurred at   3:21:21 AM, consisting of 5 beats, with maximum heart rate of 171 BPM.   *   The patient's rhythm included 3 hours 39 minutes 1 second of bradycardia.   The slowest single episode of bradycardia   occurred at 7:46:38 AM, lasting 16 seconds, with minimum heart rate of 47   BPM.   *   The patient's rhythm included 28 minutes 28 seconds of tachycardia. The   fastest single episode of tachycardia occurred at   10:50:03 AM, lasting 27 seconds, with maximum heart rate of 124 BPM.   *   Supraventricular ectopic activity consisted of 5 runs, 600 atrial   couplets, 15 late beats, 3369 single PACs, 645 in trigeminy.   The longest supraventricular run occurred at 11:31:31 PM consisting of 13   beats, with maximum heart rate of 148 BPM. The   fastest supraventricular run occurred at 10:18:52 PM, consisting of 3 beats,   with maximum heart rate of 179 BPM.   *   Diary Entries- No symptoms listed in diary. During monitoring the patient   pressed the event button with no  associated diary   entry.     Assessment:     1. Congestive heart failure, NYHA class 2 and ACC/AHA stage C (HCC)     2. ACC/AHA stage C systolic heart failure (HCC)     3. Non-ischemic cardiomyopathy (HCC)     4. Coronary artery disease involving native coronary artery of native heart without angina pectoris     5. Dyslipidemia         Medical Decision Making:  Today's Assessment / Status / Plan:     Ms. Montague is having some lightheadedness.  However, her heart rate is probably not accurate on her blood pressure device because of her frequent ectopics.  We will try to increase her beta-blocker therapy to see if this will improve her average heart rate without causing increasing symptoms.  She will follow-up in a few weeks.

## 2019-12-20 ENCOUNTER — OFFICE VISIT (OUTPATIENT)
Dept: CARDIOLOGY | Facility: MEDICAL CENTER | Age: 59
End: 2019-12-20
Payer: COMMERCIAL

## 2019-12-20 VITALS
HEART RATE: 59 BPM | DIASTOLIC BLOOD PRESSURE: 60 MMHG | SYSTOLIC BLOOD PRESSURE: 90 MMHG | WEIGHT: 219 LBS | HEIGHT: 62 IN | OXYGEN SATURATION: 98 % | BODY MASS INDEX: 40.3 KG/M2

## 2019-12-20 DIAGNOSIS — I42.8 NON-ISCHEMIC CARDIOMYOPATHY (HCC): ICD-10-CM

## 2019-12-20 DIAGNOSIS — I50.9 HEART FAILURE, NYHA CLASS 2 (HCC): ICD-10-CM

## 2019-12-20 DIAGNOSIS — I49.1 ATRIAL PREMATURE DEPOLARIZATION: ICD-10-CM

## 2019-12-20 DIAGNOSIS — Z59.48 LACK OF ACCESS TO ADEQUATE FOOD: ICD-10-CM

## 2019-12-20 DIAGNOSIS — I50.9 CONGESTIVE HEART FAILURE, NYHA CLASS 2 AND ACC/AHA STAGE C (HCC): ICD-10-CM

## 2019-12-20 DIAGNOSIS — I25.10 CORONARY ARTERY DISEASE INVOLVING NATIVE CORONARY ARTERY OF NATIVE HEART WITHOUT ANGINA PECTORIS: ICD-10-CM

## 2019-12-20 DIAGNOSIS — E78.5 DYSLIPIDEMIA: ICD-10-CM

## 2019-12-20 PROCEDURE — 99214 OFFICE O/P EST MOD 30 MIN: CPT | Performed by: NURSE PRACTITIONER

## 2019-12-20 RX ORDER — SPIRONOLACTONE 25 MG/1
12.5 TABLET ORAL
Qty: 45 TAB | Refills: 3 | Status: SHIPPED | OUTPATIENT
Start: 2019-12-20 | End: 2020-07-08 | Stop reason: SINTOL

## 2019-12-20 RX ORDER — FUROSEMIDE 20 MG/1
10 TABLET ORAL DAILY
Qty: 45 TAB | Refills: 3 | Status: SHIPPED | OUTPATIENT
Start: 2019-12-20 | End: 2020-07-08

## 2019-12-20 SDOH — ECONOMIC STABILITY - FOOD INSECURITY: OTHER SPECIFIED LACK OF ADEQUATE FOOD: Z59.48

## 2019-12-20 ASSESSMENT — ENCOUNTER SYMPTOMS
MYALGIAS: 0
CLAUDICATION: 0
ORTHOPNEA: 0
DIZZINESS: 0
FEVER: 0
PND: 0
SHORTNESS OF BREATH: 1
COUGH: 0
ABDOMINAL PAIN: 0
PALPITATIONS: 0

## 2019-12-20 NOTE — PROGRESS NOTES
Chief Complaint   Patient presents with   • Congestive Heart Failure     2 MO F/V DX: CHF       Subjective:   Ivelisse Montague is a 59 y.o. female who presents today for follow-up on her heart failure.    Patient of the heart failure clinic.  She was last seen in clinic on 11/26/2019 with Dr. Mesa.  During that visit, her Toprol-XL was increased to 50 mg daily.  She reports no problems with the dose increase.    For her symptoms, she feels fairly well.  She does continue to have some dyspnea with exertion.  She does report occasional episodes of lower extremity edema when she takes a full tablet of furosemide 20 mg.  She denies chest pain, palpitations, orthopnea, PND or dizziness/lightheadedness.    She reports her home weights are stable between 209-211 pounds.    She currently is out of work till May 2020.  Patient is wondering if she could possibly go back early in March.  She is considering getting disability, but is not sure.    She was able to  food from the food bank with the food prescription.    Additonally, patient has the following medical problems:    -Hospitalization from 9/27/2019 through 10/2/2019.  Patient came reporting lower extremity edema and shortness of breath.  She was found to have an LVEF of 20%, was sent for coronary angiogram which showed only mild CAD.  Patient was also treated for hypertension.  Patient was diuresed, and sent home on medical therapy.    -Hyperlipidemia    -GERD    -Hypothyroidism    -Chronic back pain    Past Medical History:   Diagnosis Date   • Back pain    • CAD (coronary artery disease)    • CHF (congestive heart failure) ([facility])    • GERD (gastroesophageal reflux disease)    • Hyperlipidemia    • Hypertension    • RLS (restless legs syndrome)    • Thyroid disease      Past Surgical History:   Procedure Laterality Date   • KNEE REPLACEMENT, TOTAL Bilateral    • LUMBAR LAMINECTOMY DISKECTOMY     • WRIST FUSION Left     x2   • ZZZ CARDIAC CATH       Family  History   Problem Relation Age of Onset   • Diabetes Mother    • Lung Disease Mother    • Cancer Mother         CLL   • Osteoporosis Mother    • Asthma Mother    • Heart Attack Father    • Thyroid Sister    • Heart Disease Brother    • Diabetes Brother    • Heart Attack Brother    • Lung Disease Brother    • Bladder cancer Brother    • Heart Disease Brother    • Heart Attack Brother      Social History     Socioeconomic History   • Marital status: Single     Spouse name: Not on file   • Number of children: Not on file   • Years of education: Not on file   • Highest education level: Not on file   Occupational History   • Not on file   Social Needs   • Financial resource strain: Not on file   • Food insecurity:     Worry: Not on file     Inability: Not on file   • Transportation needs:     Medical: Not on file     Non-medical: Not on file   Tobacco Use   • Smoking status: Former Smoker     Packs/day: 1.00     Years: 40.00     Pack years: 40.00     Types: Cigarettes     Last attempt to quit: 2008     Years since quittin.6   • Smokeless tobacco: Never Used   • Tobacco comment: 1-1.5 PPD   Substance and Sexual Activity   • Alcohol use: No   • Drug use: No   • Sexual activity: Not on file   Lifestyle   • Physical activity:     Days per week: Not on file     Minutes per session: Not on file   • Stress: Not on file   Relationships   • Social connections:     Talks on phone: Not on file     Gets together: Not on file     Attends J[employer] service: Not on file     Active member of club or organization: Not on file     Attends meetings of clubs or organizations: Not on file     Relationship status: Not on file   • Intimate partner violence:     Fear of current or ex partner: Not on file     Emotionally abused: Not on file     Physically abused: Not on file     Forced sexual activity: Not on file   Other Topics Concern   • Not on file   Social History Narrative   • Not on file     Allergies   Allergen Reactions   •  "Codeine Vomiting   • Lipitor [Atorvastatin Calcium]      Outpatient Encounter Medications as of 12/20/2019   Medication Sig Dispense Refill   • metoprolol SR (TOPROL XL) 50 MG TABLET SR 24 HR Take 1 Tab by mouth every day. 30 Tab 11   • spironolactone (ALDACTONE) 25 MG Tab TAKE 1 TABLET BY MOUTH EVERY DAY 90 Tab 3   • lisinopril (PRINIVIL) 10 MG Tab TAKE 1 TABLET BY MOUTH EVERY DAY 90 Tab 3   • simvastatin (ZOCOR) 20 MG Tab TAKE 1 TABLET BY MOUTH EVERY DAY IN THE EVENING 90 Tab 3   • MAGNESIUM-OXIDE 400 (241.3 Mg) MG Tab tablet Take 400 mg by mouth every 48 hours.  0   • MAGNESIUM PO Take 1 Tab by mouth every day.     • furosemide (LASIX) 20 MG Tab Take 1 Tab by mouth every day. 60 Tab 0   • aspirin 81 MG EC tablet Take 1 Tab by mouth every day. 30 Tab 0   • levothyroxine (SYNTHROID) 200 MCG Tab Take 200 mcg by mouth Every morning on an empty stomach.       No facility-administered encounter medications on file as of 12/20/2019.      Review of Systems   Constitutional: Negative for fever and malaise/fatigue.   Respiratory: Positive for shortness of breath. Negative for cough.    Cardiovascular: Positive for leg swelling (Comes and goes). Negative for chest pain, palpitations, orthopnea, claudication and PND.   Gastrointestinal: Negative for abdominal pain.   Musculoskeletal: Negative for myalgias.   Neurological: Negative for dizziness.   All other systems reviewed and are negative.       Objective:   BP (!) 90/60 (BP Location: Left arm, Patient Position: Sitting, BP Cuff Size: Adult)   Pulse (!) 59   Ht 1.575 m (5' 2\")   Wt 99.3 kg (219 lb)   SpO2 98%   BMI 40.06 kg/m²     Physical Exam   Constitutional: She is oriented to person, place, and time. She appears well-developed and well-nourished.   HENT:   Head: Normocephalic and atraumatic.   Eyes: Pupils are equal, round, and reactive to light. EOM are normal.   Neck: Normal range of motion. Neck supple. No JVD present.   Cardiovascular: Normal rate, regular " rhythm and normal heart sounds.   Pulmonary/Chest: Effort normal and breath sounds normal. No respiratory distress. She has no wheezes. She has no rales.   Abdominal: Soft. Bowel sounds are normal.   Musculoskeletal:         General: No edema.   Neurological: She is alert and oriented to person, place, and time.   Skin: Skin is warm and dry.   Psychiatric: She has a normal mood and affect. Her behavior is normal.   Vitals reviewed.    No results found for: CHOLSTRLTOT, LDL, HDL, TRIGLYCERIDE    Lab Results   Component Value Date/Time    SODIUM 144 11/22/2019 06:42 AM    SODIUM 137 10/02/2019 02:29 AM    POTASSIUM 4.5 11/22/2019 06:42 AM    POTASSIUM 4.3 10/02/2019 02:29 AM    CHLORIDE 104 11/22/2019 06:42 AM    CHLORIDE 100 10/02/2019 02:29 AM    CO2 26 11/22/2019 06:42 AM    CO2 26 10/02/2019 02:29 AM    GLUCOSE 104 (H) 11/22/2019 06:42 AM    GLUCOSE 96 10/02/2019 02:29 AM    BUN 20 11/22/2019 06:42 AM    BUN 40 (H) 10/02/2019 02:29 AM    CREATININE 1.18 (H) 11/22/2019 06:42 AM    CREATININE 1.03 10/02/2019 02:29 AM    BUNCREATRAT 17 11/22/2019 06:42 AM     Lab Results   Component Value Date/Time    ALKPHOSPHAT 60 10/01/2019 12:36 AM    ASTSGOT 24 10/01/2019 12:36 AM    ALTSGPT 23 10/01/2019 12:36 AM    TBILIRUBIN 0.9 10/01/2019 12:36 AM      Transthoracic Echo Report 9/27/2019  No prior study is available for comparison.   Severely reduced left ventricular systolic function. Left ventricular   ejection fraction is visually estimated to be 20%.   Grade I diastolic dysfunction.  Mildly dilated right ventricle. Reduced right ventricular systolic   function.  Normal inferior vena cava size and inspiratory collapse.    Heart cath 10/1/2019  Conclusions    1. There is minimal  coronary artery disease with one vessel disease.    2. Normal LVEDP.    Recommendations    * Continue medical management and risk factor modification.     Assessment:     1. Congestive heart failure, NYHA class 2 and ACC/AHA stage C (HCC)   furosemide (LASIX) 20 MG Tab    EC-ECHOCARDIOGRAM COMPLETE W/O CONT    spironolactone (ALDACTONE) 25 MG Tab    Basic Metabolic Panel   2. Heart failure, NYHA class 2 (HCC)  furosemide (LASIX) 20 MG Tab    EC-ECHOCARDIOGRAM COMPLETE W/O CONT    spironolactone (ALDACTONE) 25 MG Tab    Basic Metabolic Panel   3. Non-ischemic cardiomyopathy (HCC)     4. Coronary artery disease involving native coronary artery of native heart without angina pectoris     5. Dyslipidemia     6. Atrial premature depolarization     7. Lack of access to adequate food         Medical Decision Making:  Today's Assessment / Status / Plan:   1. HFrEF, Stage C, Class 2-3, LVEF 20%: Based on physical examination findings, patient is euvolemic. No JVD, lungs are clear to auscultation, no pitting edema in bilateral lower extremities, no ascites.  -Unable to make further adjustments on her medical therapy due to borderline low blood pressure  -Continue lisinopril 10 mg daily  -Continue furosemide 10 mg daily and if needed ok to increase to 20 mg daily  -Continue Toprol-XL 50 mg daily  -Continue spironolactone 12.5 mg daily (she sometimes takes a full tablet with her increased dose of furosemide)  -Okay to resume OTC magnesium daily   -BMP in 4 weeks  -Repeat echocardiogram in 4 weeks  -If EF remains less than 35%, we will consider ICD for primary prevention, to optimize his heart failure regimen or additional change in medical therapy such as switching patient over to Entresto  -Reinforced s/sx of worsening heart failure with patient and weight monitoring. Pt verbalizes understanding. Pt to call office or RTC if present.     2.  Hypertension: Stable  -Continue recommendations per above    3.  CAD/hyperlipidemia: Minimal CAD per angiogram  -Patient has an allergy to atorvastatin  -Continue simvastatin 20 mg daily (patient reports she has been tolerating simvastatin)  -Continue aspirin 81 mg daily    4.  Lack of access to adequate food:  -Patient given  food prescription for the year    FU in clinic in 4 weeks after echo. Sooner if needed.    Patient verbalizes understanding and agrees with the plan of care.     Collaborating MD: Nakul Faustin MD

## 2020-01-09 ENCOUNTER — TELEPHONE (OUTPATIENT)
Dept: CARDIOLOGY | Facility: MEDICAL CENTER | Age: 60
End: 2020-01-09

## 2020-01-09 NOTE — TELEPHONE ENCOUNTER
S/w patient about non-fasting labs, she stated that she will be completing her labs tomorrow( 01/10).

## 2020-01-10 ENCOUNTER — HOSPITAL ENCOUNTER (OUTPATIENT)
Dept: CARDIOLOGY | Facility: MEDICAL CENTER | Age: 60
End: 2020-01-10
Attending: NURSE PRACTITIONER
Payer: COMMERCIAL

## 2020-01-11 LAB
BUN SERPL-MCNC: 25 MG/DL (ref 8–27)
BUN/CREAT SERPL: 18 (ref 12–28)
CALCIUM SERPL-MCNC: 9.8 MG/DL (ref 8.7–10.3)
CHLORIDE SERPL-SCNC: 101 MMOL/L (ref 96–106)
CO2 SERPL-SCNC: 24 MMOL/L (ref 20–29)
CREAT SERPL-MCNC: 1.4 MG/DL (ref 0.57–1)
GLUCOSE SERPL-MCNC: 94 MG/DL (ref 65–99)
POTASSIUM SERPL-SCNC: 5.1 MMOL/L (ref 3.5–5.2)
SODIUM SERPL-SCNC: 139 MMOL/L (ref 134–144)

## 2020-01-15 ENCOUNTER — HOSPITAL ENCOUNTER (OUTPATIENT)
Dept: CARDIOLOGY | Facility: MEDICAL CENTER | Age: 60
End: 2020-01-15
Attending: NURSE PRACTITIONER
Payer: COMMERCIAL

## 2020-01-15 DIAGNOSIS — I50.9 CONGESTIVE HEART FAILURE, NYHA CLASS 2 AND ACC/AHA STAGE C (HCC): ICD-10-CM

## 2020-01-15 DIAGNOSIS — I50.9 HEART FAILURE, NYHA CLASS 2 (HCC): ICD-10-CM

## 2020-01-15 LAB
LV EJECT FRACT  99904: 55
LV EJECT FRACT MOD 2C 99903: 68.03
LV EJECT FRACT MOD 4C 99902: 62.65
LV EJECT FRACT MOD BP 99901: 66.52

## 2020-01-15 PROCEDURE — 93306 TTE W/DOPPLER COMPLETE: CPT | Mod: 26 | Performed by: INTERNAL MEDICINE

## 2020-01-15 PROCEDURE — 93306 TTE W/DOPPLER COMPLETE: CPT

## 2020-01-17 ENCOUNTER — OFFICE VISIT (OUTPATIENT)
Dept: CARDIOLOGY | Facility: MEDICAL CENTER | Age: 60
End: 2020-01-17
Payer: COMMERCIAL

## 2020-01-17 VITALS
HEART RATE: 56 BPM | SYSTOLIC BLOOD PRESSURE: 112 MMHG | OXYGEN SATURATION: 95 % | HEIGHT: 62 IN | WEIGHT: 218.6 LBS | BODY MASS INDEX: 40.23 KG/M2 | DIASTOLIC BLOOD PRESSURE: 68 MMHG

## 2020-01-17 DIAGNOSIS — I42.8 NON-ISCHEMIC CARDIOMYOPATHY (HCC): ICD-10-CM

## 2020-01-17 DIAGNOSIS — I10 HTN (HYPERTENSION), MALIGNANT: ICD-10-CM

## 2020-01-17 DIAGNOSIS — I50.20 ACC/AHA STAGE C SYSTOLIC HEART FAILURE (HCC): ICD-10-CM

## 2020-01-17 DIAGNOSIS — E78.2 MIXED HYPERLIPIDEMIA: ICD-10-CM

## 2020-01-17 DIAGNOSIS — E66.9 OBESITY (BMI 30-39.9): ICD-10-CM

## 2020-01-17 DIAGNOSIS — I50.9 CONGESTIVE HEART FAILURE, NYHA CLASS 2 AND ACC/AHA STAGE C (HCC): ICD-10-CM

## 2020-01-17 DIAGNOSIS — R06.02 SOB (SHORTNESS OF BREATH): ICD-10-CM

## 2020-01-17 DIAGNOSIS — I25.10 CORONARY ARTERY DISEASE INVOLVING NATIVE CORONARY ARTERY OF NATIVE HEART WITHOUT ANGINA PECTORIS: ICD-10-CM

## 2020-01-17 DIAGNOSIS — R06.81 APNEA: ICD-10-CM

## 2020-01-17 DIAGNOSIS — N17.9 AKI (ACUTE KIDNEY INJURY) (HCC): ICD-10-CM

## 2020-01-17 DIAGNOSIS — R06.83 SNORES: ICD-10-CM

## 2020-01-17 PROCEDURE — 99214 OFFICE O/P EST MOD 30 MIN: CPT | Mod: 25 | Performed by: NURSE PRACTITIONER

## 2020-01-17 PROCEDURE — 94618 PULMONARY STRESS TESTING: CPT | Performed by: NURSE PRACTITIONER

## 2020-01-17 RX ORDER — RABEPRAZOLE SODIUM 20 MG/1
1 TABLET, DELAYED RELEASE ORAL DAILY
COMMUNITY
Start: 2019-12-18 | End: 2020-10-12 | Stop reason: SDUPTHER

## 2020-01-17 ASSESSMENT — MINNESOTA LIVING WITH HEART FAILURE QUESTIONNAIRE (MLHF)
LOSS OF SELF CONTROL IN YOUR LIFE: 3
MAKING YOU WORRY: 3
WALKING ABOUT OR CLIMBING STAIRS DIFFICULT: 5
DIFFICULTY WITH RECREATIONAL PASTIMES, SPORTS, HOBBIES: 5
MAKING YOU FEEL DEPRESSED: 1
DIFFICULTY SLEEPING WELL AT NIGHT: 3
DIFFICULTY WITH SEXUAL ACTIVITIES: 5
HAVING TO SIT OR LIE DOWN DURING THE DAY: 5
SWELLING IN ANKLES OR LEGS: 3
FEELING LIKE A BURDEN TO FAMILY AND FRIENDS: 0
DIFFICULTY WORKING TO EARN A LIVING: 5
WORKING AROUND THE HOUSE OR YARD DIFFICULT: 4
MAKING YOU SHORT OF BREATH: 5
TIRED, FATIGUED OR LOW ON ENERGY: 5
COSTING YOU MONEY FOR MEDICAL CARE: 5
MAKING YOU STAY IN A HOSPITAL: 0
DIFFICULTY GOING AWAY FROM HOME: 3
DIFFICULTY TO CONCENTRATE OR REMEMBERING THINGS: 0
EATING LESS FOODS YOU LIKE: 2
GIVING YOU SIDE EFFECTS FROM TREATMENTS: 0
TOTAL_SCORE: 62
DIFFICULTY SOCIALIZING WITH FAMILY OR FRIENDS: 0

## 2020-01-17 ASSESSMENT — ENCOUNTER SYMPTOMS
PND: 0
CLAUDICATION: 0
PALPITATIONS: 0
COUGH: 0
DIZZINESS: 0
SHORTNESS OF BREATH: 1
ABDOMINAL PAIN: 0
FEVER: 0
ORTHOPNEA: 0
MYALGIAS: 0

## 2020-01-17 ASSESSMENT — 6 MINUTE WALK TEST (6MWT): TOTAL DISTANCE WALKED (METERS): 285

## 2020-01-17 NOTE — PROGRESS NOTES
Chief Complaint   Patient presents with   • Congestive Heart Failure       Subjective:   Ivelisse Montague is a 59 y.o. female who presents today for follow-up on her heart failure.    Patient of the heart failure clinic.  She was last seen in on 12/20/2019.  During that visit, she was sent for repeat echocardiogram and lab testing.    For her symptoms, she continues report shortness of breath with exertion and lower extremity edema that comes and goes.  She states she has taken a few doses of 10 mg of Lasix as needed.  She denies chest pain, palpitation, orthopnea, PND, dizziness/lightheadedness.    She reports her home weights are ranging around 215 pounds.    She recently was approved for disability.    She reports a history of an abnormal overnight pulse ox study in which they requested a sleep study.  The patient did not complete a sleep study.    At 6 minute walk test re-evaluation, patient was able to complete 285 m during her 6 minute walk test.  Her O2 saturation at baseline was 95 % and at the end of the test, the O2 saturation was 91 %.  She reported level 4 of dyspnea on Juancarlos scale. MLWHF 62.  She was only able to walk for 5 minutes and 5 seconds and stopped due to shortness of breath      Additonally, patient has the following medical problems:    -Hospitalization from 9/27/2019 through 10/2/2019.  Patient came reporting lower extremity edema and shortness of breath.  She was found to have an LVEF of 20%, was sent for coronary angiogram which showed only mild CAD.  Patient was also treated for hypertension.  Patient was diuresed, and sent home on medical therapy.    -Hyperlipidemia    -GERD    -Hypothyroidism    -Chronic back pain    -Former smoker: Quit 2008, smoked for 45 years    Past Medical History:   Diagnosis Date   • Back pain    • CAD (coronary artery disease)    • CHF (congestive heart failure) ([facility])    • GERD (gastroesophageal reflux disease)    • Hyperlipidemia    • Hypertension    • RLS  (restless legs syndrome)    • Thyroid disease      Past Surgical History:   Procedure Laterality Date   • KNEE REPLACEMENT, TOTAL Bilateral    • LUMBAR LAMINECTOMY DISKECTOMY     • WRIST FUSION Left     x2   • ZZZ CARDIAC CATH       Family History   Problem Relation Age of Onset   • Diabetes Mother    • Lung Disease Mother    • Cancer Mother         CLL   • Osteoporosis Mother    • Asthma Mother    • Heart Attack Father    • Thyroid Sister    • Heart Disease Brother    • Diabetes Brother    • Heart Attack Brother    • Lung Disease Brother    • Bladder cancer Brother    • Heart Disease Brother    • Heart Attack Brother      Social History     Socioeconomic History   • Marital status: Single     Spouse name: Not on file   • Number of children: Not on file   • Years of education: Not on file   • Highest education level: Not on file   Occupational History   • Not on file   Social Needs   • Financial resource strain: Not on file   • Food insecurity:     Worry: Not on file     Inability: Not on file   • Transportation needs:     Medical: Not on file     Non-medical: Not on file   Tobacco Use   • Smoking status: Former Smoker     Packs/day: 1.00     Years: 40.00     Pack years: 40.00     Types: Cigarettes     Last attempt to quit: 2008     Years since quittin.7   • Smokeless tobacco: Never Used   • Tobacco comment: 1-1.5 PPD   Substance and Sexual Activity   • Alcohol use: No   • Drug use: No   • Sexual activity: Not on file   Lifestyle   • Physical activity:     Days per week: Not on file     Minutes per session: Not on file   • Stress: Not on file   Relationships   • Social connections:     Talks on phone: Not on file     Gets together: Not on file     Attends Rastafari service: Not on file     Active member of club or organization: Not on file     Attends meetings of clubs or organizations: Not on file     Relationship status: Not on file   • Intimate partner violence:     Fear of current or ex partner: Not on  file     Emotionally abused: Not on file     Physically abused: Not on file     Forced sexual activity: Not on file   Other Topics Concern   • Not on file   Social History Narrative   • Not on file     Allergies   Allergen Reactions   • Codeine Vomiting   • Lipitor [Atorvastatin Calcium]      Outpatient Encounter Medications as of 1/17/2020   Medication Sig Dispense Refill   • vitamin D (CHOLECALCIFEROL) 1000 UNIT Tab Take 1,000 Units by mouth every day.     • furosemide (LASIX) 20 MG Tab Take 0.5 Tabs by mouth every day. 45 Tab 3   • spironolactone (ALDACTONE) 25 MG Tab Take 0.5 Tabs by mouth every day. 45 Tab 3   • metoprolol SR (TOPROL XL) 50 MG TABLET SR 24 HR Take 1 Tab by mouth every day. 30 Tab 11   • lisinopril (PRINIVIL) 10 MG Tab TAKE 1 TABLET BY MOUTH EVERY DAY 90 Tab 3   • simvastatin (ZOCOR) 20 MG Tab TAKE 1 TABLET BY MOUTH EVERY DAY IN THE EVENING 90 Tab 3   • MAGNESIUM-OXIDE 400 (241.3 Mg) MG Tab tablet Take 400 mg by mouth 3 times a day.  0   • aspirin 81 MG EC tablet Take 1 Tab by mouth every day. 30 Tab 0   • levothyroxine (SYNTHROID) 200 MCG Tab Take 200 mcg by mouth Every morning on an empty stomach.     • rabeprazole (ACIPHEX) 20 MG tablet Take 1 Tab by mouth every day.     • [DISCONTINUED] MAGNESIUM PO Take 1 Tab by mouth every day.       No facility-administered encounter medications on file as of 1/17/2020.      Review of Systems   Constitutional: Negative for fever and malaise/fatigue.   Respiratory: Positive for shortness of breath. Negative for cough.    Cardiovascular: Positive for leg swelling (Comes and goes). Negative for chest pain, palpitations, orthopnea, claudication and PND.   Gastrointestinal: Negative for abdominal pain.   Musculoskeletal: Negative for myalgias.   Neurological: Negative for dizziness.   All other systems reviewed and are negative.       Objective:   /68 (BP Location: Left arm, Patient Position: Sitting, BP Cuff Size: Adult)   Pulse (!) 56   Ht 1.575 m  "(5' 2\")   Wt 99.2 kg (218 lb 9.6 oz)   SpO2 95%   BMI 39.98 kg/m²     Physical Exam   Constitutional: She is oriented to person, place, and time. She appears well-developed and well-nourished.   BMI 39.98   HENT:   Head: Normocephalic and atraumatic.   Eyes: Pupils are equal, round, and reactive to light. EOM are normal.   Neck: Normal range of motion. Neck supple. No JVD present.   Cardiovascular: Normal rate, regular rhythm and normal heart sounds.   Pulmonary/Chest: Effort normal and breath sounds normal. No respiratory distress. She has no wheezes. She has no rales.   Abdominal: Soft. Bowel sounds are normal.   Musculoskeletal:         General: No edema.   Neurological: She is alert and oriented to person, place, and time.   Skin: Skin is warm and dry.   Psychiatric: She has a normal mood and affect. Her behavior is normal.   Vitals reviewed.    No results found for: CHOLSTRLTOT, LDL, HDL, TRIGLYCERIDE    Lab Results   Component Value Date/Time    SODIUM 139 01/10/2020 08:19 AM    SODIUM 137 10/02/2019 02:29 AM    POTASSIUM 5.1 01/10/2020 08:19 AM    POTASSIUM 4.3 10/02/2019 02:29 AM    CHLORIDE 101 01/10/2020 08:19 AM    CHLORIDE 100 10/02/2019 02:29 AM    CO2 24 01/10/2020 08:19 AM    CO2 26 10/02/2019 02:29 AM    GLUCOSE 94 01/10/2020 08:19 AM    GLUCOSE 96 10/02/2019 02:29 AM    BUN 25 01/10/2020 08:19 AM    BUN 40 (H) 10/02/2019 02:29 AM    CREATININE 1.40 (H) 01/10/2020 08:19 AM    CREATININE 1.03 10/02/2019 02:29 AM    BUNCREATRAT 18 01/10/2020 08:19 AM     Lab Results   Component Value Date/Time    ALKPHOSPHAT 60 10/01/2019 12:36 AM    ASTSGOT 24 10/01/2019 12:36 AM    ALTSGPT 23 10/01/2019 12:36 AM    TBILIRUBIN 0.9 10/01/2019 12:36 AM      Transthoracic Echo Report 9/27/2019  No prior study is available for comparison.   Severely reduced left ventricular systolic function. Left ventricular   ejection fraction is visually estimated to be 20%.   Grade I diastolic dysfunction.  Mildly dilated right " ventricle. Reduced right ventricular systolic   function.  Normal inferior vena cava size and inspiratory collapse.    Heart cath 10/1/2019  Conclusions    1. There is minimal  coronary artery disease with one vessel disease.    2. Normal LVEDP.    Recommendations    * Continue medical management and risk factor modification.     Transthoracic Echo Report 1/15/2020-results reviewed with patient  Normal left ventricular systolic function. Left ventricular ejection   fraction is visually estimated to be 55%.  Indeterminate diastolic function.  Mildly dilated right ventricle. Reduced right ventricular systolic   function.  Normal inferior vena cava size and inspiratory collapse.  Estimated right ventricular systolic pressure  is 36 mmHg.    Assessment:     1. SOB (shortness of breath)  REFERRAL TO SLEEP STUDIES   2. Snores  REFERRAL TO SLEEP STUDIES   3. Apnea  REFERRAL TO SLEEP STUDIES   4. Congestive heart failure, NYHA class 2 and ACC/AHA stage C (HCC)     5. ACC/AHA stage C systolic heart failure (HCC)  Basic Metabolic Panel   6. ISRRAEL (acute kidney injury) ([facility])  Basic Metabolic Panel   7. Non-ischemic cardiomyopathy (HCC)     8. Coronary artery disease involving native coronary artery of native heart without angina pectoris     9. Mixed hyperlipidemia     10. HTN (hypertension), malignant     11. Obesity (BMI 30-39.9)         Medical Decision Making:  Today's Assessment / Status / Plan:   1. HFrEF, Stage C, Class 2-3, LVEF 55% improved from 20%: Based on physical examination findings, patient is euvolemic. No JVD, lungs are clear to auscultation, no pitting edema in bilateral lower extremities, no ascites.  -Did review lab testing, which showed slight worsening of her kidney function, patient reports she may have been not hydrating enough.  Encourage adequate hydration  -Repeat BMP in 1 month, may need to consider discontinuing spironolactone if kidney function continues to worsen  -Continue lisinopril 10 mg  daily  -Continue furosemide 10 mg daily and if needed ok to increase to 20 mg daily  -Continue Toprol-XL 50 mg daily  -Continue spironolactone 12.5 mg daily (she sometimes takes a full tablet with her increased dose of furosemide)  -Okay to resume OTC magnesium daily   -No indication for ICD with improvement of her EF to 55%  -Reinforced s/sx of worsening heart failure with patient and weight monitoring. Pt verbalizes understanding. Pt to call office or RTC if present.   -Recommend sleep study, referral placed. Pt has dilated right ventricle and history of abnormal OPO (pt reports her  states she snores and has apneic periods in sleep)    2.  Hypertension: Stable  -Continue recommendations per above    3.  CAD/hyperlipidemia: Minimal CAD per angiogram  -Patient has an allergy to atorvastatin  -Continue simvastatin 20 mg daily (patient reports she has been tolerating simvastatin)  -Continue aspirin 81 mg daily    4.  Obesity: BMI 39.98  -Patient reports difficulty losing weight  -Encouraged weight loss    Patient also encouraged to follow-up with her PCP for possible testing for COPD.  She has a history of smoking.  She might have underlying COPD which could be contributing to her shortness of breath.    FU in clinic in 3-4 months to establish with new cardiologist, patient did have a initial consult when she was in the hospital with Dr. Sebastian.  She would like to establish with Dr. Sebastian a possible.  Sooner if needed.    Patient verbalizes understanding and agrees with the plan of care.     Collaborating MD: Amos Roman MD

## 2020-01-20 DIAGNOSIS — I50.9 CONGESTIVE HEART FAILURE, NYHA CLASS 2 AND ACC/AHA STAGE C (HCC): Primary | ICD-10-CM

## 2020-01-20 RX ORDER — METOPROLOL SUCCINATE 50 MG/1
50 TABLET, EXTENDED RELEASE ORAL DAILY
Qty: 90 TAB | Refills: 3 | Status: SHIPPED
Start: 2020-01-20 | End: 2021-01-21

## 2020-05-11 NOTE — PROGRESS NOTES
"CC: Reevaluation of previously diagnosed EDWARD: \"Stop breathing at night\"    HPI:  Ms. Ivelisse Montague is a 60-year-old unemployed female kindly referred by JEANETTE Jose.  A [facility] home sleep study in 2013 showed an RDI of 53.3 with saturations less than 90% for 25% of the recording.  In January 2014 she was advised to have a positive airway pressure titration performed but this was not accomplished.    Symptoms include morning headaches, fatigue, nonrestorative sleep, and snoring.  She briefly describes her sleep problem as \"stop breathing and wake up gasping for air sometimes\".    She has a regular bed partner.  Her bedtime and awakening times are variable.  She watches TV just prior to turning out the lights.  She experiences 3 episodes of nocturia.  Other symptoms include napping sleepiness during the day and nonrestorative sleep.  She is known to snore.  She has restless legs occasionally relieved by getting out of bed.    Her  has noted loud snoring, light snoring, twitching of legs and feet during sleep, and witnessed apneas with resuscitative snorts.  Her total [place] score is normal 9 out of 24.    Review of her sleep diary shows napping for 7 days.  She awakened feeling refreshed only 3 of 7 mornings.    Significant comorbidities and modifying factors include congestive heart failure, hypertension, GERD, dyslipidemia, COPD, hypothyroidism, chronic back pain, former smoker, restless legs, and obesity.  Her last echo in January 2020 showed that her LVEF improved from 20% to 55%.                  Patient Active Problem List    Diagnosis Date Noted   • New onset of congestive heart failure (HCC) 09/27/2019     Priority: High   • Elevated troponin 09/27/2019     Priority: Medium   • Dyslipidemia 09/27/2019     Priority: Low   • Hypothyroidism 09/27/2019     Priority: Low   • GERD (gastroesophageal reflux disease) 09/27/2019     Priority: Low   • Obesity 09/27/2019     Priority: Low   • EDWARD " (obstructive sleep apnea) 2020   • ACC/AHA stage C systolic heart failure (HCC) 10/31/2019   • Congestive heart failure, NYHA class 2 and ACC/AHA stage C (HCC) 10/31/2019   • Coronary artery disease involving native coronary artery of native heart without angina pectoris 10/31/2019   • Non-ischemic cardiomyopathy (HCC) 10/31/2019       Past Medical History:   Diagnosis Date   • Back pain    • CAD (coronary artery disease)    • CHF (congestive heart failure) ([facility])    • GERD (gastroesophageal reflux disease)    • Hyperlipidemia    • Hypertension    • RLS (restless legs syndrome)    • Thyroid disease         Past Surgical History:   Procedure Laterality Date   • KNEE REPLACEMENT, TOTAL Bilateral    • LUMBAR LAMINECTOMY DISKECTOMY     • WRIST FUSION Left     x2   • ZZZ CARDIAC CATH         Family History   Problem Relation Age of Onset   • Diabetes Mother    • Lung Disease Mother    • Cancer Mother         CLL   • Osteoporosis Mother    • Asthma Mother    • Heart Attack Father    • Thyroid Sister    • Heart Disease Brother    • Diabetes Brother    • Heart Attack Brother    • Lung Disease Brother    • Bladder cancer Brother    • Heart Disease Brother    • Heart Attack Brother        Social History     Socioeconomic History   • Marital status: Single     Spouse name: Not on file   • Number of children: Not on file   • Years of education: Not on file   • Highest education level: Not on file   Occupational History   • Not on file   Social Needs   • Financial resource strain: Not on file   • Food insecurity     Worry: Not on file     Inability: Not on file   • Transportation needs     Medical: Not on file     Non-medical: Not on file   Tobacco Use   • Smoking status: Former Smoker     Packs/day: 1.00     Years: 40.00     Pack years: 40.00     Types: Cigarettes     Last attempt to quit: 2008     Years since quittin.0   • Smokeless tobacco: Never Used   • Tobacco comment: 1-1.5 PPD   Substance and Sexual  "Activity   • Alcohol use: No   • Drug use: No   • Sexual activity: Not on file   Lifestyle   • Physical activity     Days per week: Not on file     Minutes per session: Not on file   • Stress: Not on file   Relationships   • Social connections     Talks on phone: Not on file     Gets together: Not on file     Attends Orthodox service: Not on file     Active member of club or organization: Not on file     Attends meetings of clubs or organizations: Not on file     Relationship status: Not on file   • Intimate partner violence     Fear of current or ex partner: Not on file     Emotionally abused: Not on file     Physically abused: Not on file     Forced sexual activity: Not on file   Other Topics Concern   • Not on file   Social History Narrative   • Not on file       Current Outpatient Medications   Medication Sig Dispense Refill   • metoprolol SR (TOPROL XL) 50 MG TABLET SR 24 HR Take 1 Tab by mouth every day. 90 Tab 3   • vitamin D (CHOLECALCIFEROL) 1000 UNIT Tab Take 1,000 Units by mouth every day.     • rabeprazole (ACIPHEX) 20 MG tablet Take 1 Tab by mouth every day.     • furosemide (LASIX) 20 MG Tab Take 0.5 Tabs by mouth every day. 45 Tab 3   • spironolactone (ALDACTONE) 25 MG Tab Take 0.5 Tabs by mouth every day. 45 Tab 3   • lisinopril (PRINIVIL) 10 MG Tab TAKE 1 TABLET BY MOUTH EVERY DAY 90 Tab 3   • simvastatin (ZOCOR) 20 MG Tab TAKE 1 TABLET BY MOUTH EVERY DAY IN THE EVENING 90 Tab 3   • MAGNESIUM-OXIDE 400 (241.3 Mg) MG Tab tablet Take 400 mg by mouth 3 times a day.  0   • aspirin 81 MG EC tablet Take 1 Tab by mouth every day. 30 Tab 0   • levothyroxine (SYNTHROID) 200 MCG Tab Take 200 mcg by mouth Every morning on an empty stomach.       No current facility-administered medications for this visit.     \"CURRENT RX\"    ALLERGIES: Codeine and Lipitor [atorvastatin calcium]    ROS    Constitutional: Denies fever, chills, sweats,  weight loss, fatigue.  Eyes: Denies vision loss, positive for pain, denies " "drainage, double vision, wears glasses.  Ears/Nose/Mouth/Throat: Denies earache, difficulty hearing, rhinitis/nasal congestion, injury, recurrent sore throat, persistent hoarseness, decayed teeth/toothaches, ringing or buzzing in the ears.  Cardiovascular: Positive for leg swelling  Respiratory: Positive for wheezing and shortness of breath  Sleep: per HPI  Gastrointestinal: Positive for heartburn and difficulty swallowing  Genitourinary: Denies  blood in urine, discharge, frequent urination.   Musculoskeletal: Positive for all the following painful joints, sore muscles, back pain.   Integumentary: Denies rashes, lumps, color changes.   Neurological: Denies frequent headaches,weakness, dizziness.    PHYSICAL EXAM  Obese    /60 (BP Location: Left arm, Patient Position: Sitting, BP Cuff Size: Large adult)   Pulse 61   Resp 16   Ht 1.575 m (5' 2\")   Wt 101.2 kg (223 lb)   SpO2 97%   BMI 40.79 kg/m²   Appearance: Well-nourished, well-developed, no acute distress  Eyes:  PERRLA, EOMI  ENMT: without lesions, deformities;hearing grossly intact; tongue normal, posterior pharynx without erythema or exudate;   Modified Mallampati (AKA Aguiar) Score: not examined, mask on  Neck: Supple, trachea midline, no masses  Respiratory effort:  No intercostal retractions or use of accessory muscles  Lung auscultation:  No wheezes rhonchi rubs or rales  Cardiac: 2/6 miah; 1+ edema  Abdomen:  No tenderness, no organomegaly.  Obese  Musculoskeletal:  Grossly normal; gait and station normal; digits and nails normal  Skin:  No rashes, petechiae, cyanosis  Neurologic: without focal signs; oriented to person, time, place, and purpose; judgement intact  Psychiatric:  No depression, anxiety, agitation        PROBLEMS:  1. EDWARD (obstructive sleep apnea)    - Overnight Home Sleep Study; Future    2. Congestive heart failure, NYHA class 2 and ACC/AHA stage C (HCC)      3. Coronary artery disease involving native coronary artery of " native heart without angina pectoris      4. Dyslipidemia      5. Gastroesophageal reflux disease, esophagitis presence not specified      6. Non-ischemic cardiomyopathy (HCC)      7. Class 3 severe obesity with serious comorbidity in adult, unspecified BMI, unspecified obesity type (HCC)  - OBESITY COUNSELING (No Charge): Patient identified as having weight management issue.  Appropriate orders and counseling given.          PLAN:   The patient has signs and symptoms consistent with obstructive sleep apnea hypopnea syndrome. Will schedule to have an attended PSG then return to clinic for results review to determine further diagnostic needs and/or treatment options.    The risks of untreated sleep apnea were discussed with the patient at length. Patients with EDWARD are at increased risk of cardiovascular disease including coronary artery disease, systemic arterial hypertension, pulmonary arterial hypertension, cardiac arrythmias, and stroke. EDWARD patients have an increased risk of motor vehicle accidents, type 2 diabetes, chronic kidney disease, and non-alcoholic liver disease. The patient was advised to avoid driving a motor vehicle when drowsy.    Positive airway pressure, such as CPAP, is considered first-line and preferred therapy for sleep apnea and may reverse both symptoms and risks.    Have advised the patient to follow up with the appropriate healthcare practitioners for all other medical problems and issues.          Return for after sleep study.

## 2020-05-13 ENCOUNTER — SLEEP CENTER VISIT (OUTPATIENT)
Dept: SLEEP MEDICINE | Facility: MEDICAL CENTER | Age: 60
End: 2020-05-13
Payer: MEDICAID

## 2020-05-13 VITALS
WEIGHT: 223 LBS | DIASTOLIC BLOOD PRESSURE: 60 MMHG | HEIGHT: 62 IN | BODY MASS INDEX: 41.04 KG/M2 | SYSTOLIC BLOOD PRESSURE: 108 MMHG | OXYGEN SATURATION: 97 % | HEART RATE: 61 BPM | RESPIRATION RATE: 16 BRPM

## 2020-05-13 DIAGNOSIS — I25.10 CORONARY ARTERY DISEASE INVOLVING NATIVE CORONARY ARTERY OF NATIVE HEART WITHOUT ANGINA PECTORIS: ICD-10-CM

## 2020-05-13 DIAGNOSIS — K21.9 GASTROESOPHAGEAL REFLUX DISEASE, ESOPHAGITIS PRESENCE NOT SPECIFIED: ICD-10-CM

## 2020-05-13 DIAGNOSIS — E78.5 DYSLIPIDEMIA: ICD-10-CM

## 2020-05-13 DIAGNOSIS — I50.9 CONGESTIVE HEART FAILURE, NYHA CLASS 2 AND ACC/AHA STAGE C (HCC): ICD-10-CM

## 2020-05-13 DIAGNOSIS — I42.8 NON-ISCHEMIC CARDIOMYOPATHY (HCC): ICD-10-CM

## 2020-05-13 DIAGNOSIS — G47.33 OSA (OBSTRUCTIVE SLEEP APNEA): ICD-10-CM

## 2020-05-13 DIAGNOSIS — E66.01 CLASS 3 SEVERE OBESITY WITH SERIOUS COMORBIDITY IN ADULT, UNSPECIFIED BMI, UNSPECIFIED OBESITY TYPE (HCC): ICD-10-CM

## 2020-05-13 PROCEDURE — 99244 OFF/OP CNSLTJ NEW/EST MOD 40: CPT | Performed by: INTERNAL MEDICINE

## 2020-05-13 RX ORDER — ZOLPIDEM TARTRATE 5 MG/1
5 TABLET ORAL NIGHTLY PRN
Qty: 2 TAB | Refills: 0 | Status: SHIPPED | OUTPATIENT
Start: 2020-05-13 | End: 2020-07-13

## 2020-05-13 ASSESSMENT — FIBROSIS 4 INDEX: FIB4 SCORE: 1.07

## 2020-05-21 ENCOUNTER — APPOINTMENT (OUTPATIENT)
Dept: SLEEP MEDICINE | Facility: MEDICAL CENTER | Age: 60
End: 2020-05-21
Attending: INTERNAL MEDICINE
Payer: MEDICAID

## 2020-07-02 ENCOUNTER — TELEPHONE (OUTPATIENT)
Dept: CARDIOLOGY | Facility: MEDICAL CENTER | Age: 60
End: 2020-07-02

## 2020-07-02 NOTE — TELEPHONE ENCOUNTER
Spoke with  Patient regarding her non fasting labs. Patient stated that she will have them prior to her apt.

## 2020-07-08 ENCOUNTER — OFFICE VISIT (OUTPATIENT)
Dept: CARDIOLOGY | Facility: MEDICAL CENTER | Age: 60
End: 2020-07-08
Payer: MEDICAID

## 2020-07-08 VITALS
BODY MASS INDEX: 42.33 KG/M2 | WEIGHT: 230 LBS | SYSTOLIC BLOOD PRESSURE: 112 MMHG | DIASTOLIC BLOOD PRESSURE: 74 MMHG | OXYGEN SATURATION: 94 % | HEART RATE: 56 BPM | HEIGHT: 62 IN

## 2020-07-08 DIAGNOSIS — Z79.899 HIGH RISK MEDICATION USE: ICD-10-CM

## 2020-07-08 DIAGNOSIS — I25.10 CORONARY ARTERY DISEASE INVOLVING NATIVE CORONARY ARTERY OF NATIVE HEART WITHOUT ANGINA PECTORIS: ICD-10-CM

## 2020-07-08 DIAGNOSIS — N17.9 AKI (ACUTE KIDNEY INJURY) (HCC): ICD-10-CM

## 2020-07-08 DIAGNOSIS — E66.01 OBESITY, CLASS III, BMI 40-49.9 (MORBID OBESITY) (HCC): ICD-10-CM

## 2020-07-08 DIAGNOSIS — G47.33 OSA (OBSTRUCTIVE SLEEP APNEA): ICD-10-CM

## 2020-07-08 DIAGNOSIS — R06.81 APNEA: ICD-10-CM

## 2020-07-08 DIAGNOSIS — I50.9 HEART FAILURE, NYHA CLASS 2 (HCC): ICD-10-CM

## 2020-07-08 DIAGNOSIS — I50.9 CONGESTIVE HEART FAILURE, NYHA CLASS 2 AND ACC/AHA STAGE C (HCC): ICD-10-CM

## 2020-07-08 DIAGNOSIS — I10 HTN (HYPERTENSION), MALIGNANT: ICD-10-CM

## 2020-07-08 DIAGNOSIS — E78.2 MIXED HYPERLIPIDEMIA: ICD-10-CM

## 2020-07-08 DIAGNOSIS — I42.8 NON-ISCHEMIC CARDIOMYOPATHY (HCC): ICD-10-CM

## 2020-07-08 LAB
BUN SERPL-MCNC: 30 MG/DL (ref 8–27)
BUN/CREAT SERPL: 15 (ref 12–28)
CALCIUM SERPL-MCNC: 9.7 MG/DL (ref 8.7–10.3)
CHLORIDE SERPL-SCNC: 97 MMOL/L (ref 96–106)
CO2 SERPL-SCNC: 21 MMOL/L (ref 20–29)
CREAT SERPL-MCNC: 2.02 MG/DL (ref 0.57–1)
GLUCOSE SERPL-MCNC: 89 MG/DL (ref 65–99)
POTASSIUM SERPL-SCNC: 5.2 MMOL/L (ref 3.5–5.2)
SODIUM SERPL-SCNC: 136 MMOL/L (ref 134–144)

## 2020-07-08 PROCEDURE — 99214 OFFICE O/P EST MOD 30 MIN: CPT | Performed by: NURSE PRACTITIONER

## 2020-07-08 RX ORDER — METOPROLOL SUCCINATE 25 MG/1
TABLET, EXTENDED RELEASE ORAL
COMMUNITY
Start: 2020-07-08 | End: 2020-07-08

## 2020-07-08 RX ORDER — FUROSEMIDE 20 MG/1
10-20 TABLET ORAL DAILY
Qty: 45 TAB | Refills: 3 | Status: SHIPPED | OUTPATIENT
Start: 2020-07-08 | End: 2020-10-12

## 2020-07-08 ASSESSMENT — ENCOUNTER SYMPTOMS
PND: 0
FEVER: 0
ORTHOPNEA: 0
ABDOMINAL PAIN: 0
DIZZINESS: 0
CLAUDICATION: 0
COUGH: 0
MYALGIAS: 0
SHORTNESS OF BREATH: 1
PALPITATIONS: 0

## 2020-07-08 ASSESSMENT — FIBROSIS 4 INDEX: FIB4 SCORE: 1.07

## 2020-07-08 NOTE — PROGRESS NOTES
Chief Complaint   Patient presents with   • Congestive Heart Failure       Subjective:   Ivelisse Montague is a 60 y.o. female who presents today for follow-up on her heart failure.    Patient of the heart failure clinic.  She was last seen in on 1/17/2020.  During that visit, pt was recommended to get follow up lab testing. Due to COVID-19, she was unable to get lab testing done.    Patient was able to get lab testing done on Monday.    For her symptoms, she does report shortness breath with exertion, fatigue and lower extremity edema that comes and goes.  She does take full dose of furosemide if needed for her swelling.  But she takes 10 mg daily, half tab of furosemide.    She denies chest pain, palpitations, orthopnea, PND or dizziness  Lightheadedness.    She has not been weighing herself regularly, but her weights have been ranging between 220-230 pounds.    She is on disability.    Patient was unable to schedule her sleep study due to change in insurance.  She would like a new referral.    Additonally, patient has the following medical problems:    -Hospitalization from 9/27/2019 through 10/2/2019.  Patient came reporting lower extremity edema and shortness of breath.  She was found to have an LVEF of 20%, was sent for coronary angiogram which showed only mild CAD.  Patient was also treated for hypertension.  Patient was diuresed, and sent home on medical therapy.    -Hyperlipidemia    -GERD    -Hypothyroidism    -Chronic back pain    -Former smoker: Quit 2008, smoked for 45 years    Past Medical History:   Diagnosis Date   • Back pain    • CAD (coronary artery disease)    • CHF (congestive heart failure) ([facility])    • GERD (gastroesophageal reflux disease)    • Hyperlipidemia    • Hypertension    • RLS (restless legs syndrome)    • Thyroid disease      Past Surgical History:   Procedure Laterality Date   • KNEE REPLACEMENT, TOTAL Bilateral    • LUMBAR LAMINECTOMY DISKECTOMY     • WRIST FUSION Left     x2   •  ZZZ CARDIAC CATH       Family History   Problem Relation Age of Onset   • Diabetes Mother    • Lung Disease Mother    • Cancer Mother         CLL   • Osteoporosis Mother    • Asthma Mother    • Heart Attack Father    • Thyroid Sister    • Heart Disease Brother    • Diabetes Brother    • Heart Attack Brother    • Lung Disease Brother    • Bladder cancer Brother    • Heart Disease Brother    • Heart Attack Brother      Social History     Socioeconomic History   • Marital status: Single     Spouse name: Not on file   • Number of children: Not on file   • Years of education: Not on file   • Highest education level: Not on file   Occupational History   • Not on file   Social Needs   • Financial resource strain: Not on file   • Food insecurity     Worry: Not on file     Inability: Not on file   • Transportation needs     Medical: Not on file     Non-medical: Not on file   Tobacco Use   • Smoking status: Former Smoker     Packs/day: 1.00     Years: 40.00     Pack years: 40.00     Types: Cigarettes     Last attempt to quit: 2008     Years since quittin.2   • Smokeless tobacco: Never Used   • Tobacco comment: 1-1.5 PPD   Substance and Sexual Activity   • Alcohol use: No   • Drug use: No   • Sexual activity: Not on file   Lifestyle   • Physical activity     Days per week: Not on file     Minutes per session: Not on file   • Stress: Not on file   Relationships   • Social connections     Talks on phone: Not on file     Gets together: Not on file     Attends [employer] service: Not on file     Active member of club or organization: Not on file     Attends meetings of clubs or organizations: Not on file     Relationship status: Not on file   • Intimate partner violence     Fear of current or ex partner: Not on file     Emotionally abused: Not on file     Physically abused: Not on file     Forced sexual activity: Not on file   Other Topics Concern   • Not on file   Social History Narrative   • Not on file     Allergies    Allergen Reactions   • Codeine Vomiting   • Lipitor [Atorvastatin Calcium]      Outpatient Encounter Medications as of 7/8/2020   Medication Sig Dispense Refill   • furosemide (LASIX) 20 MG Tab Take 0.5-1 Tabs by mouth every day. 45 Tab 3   • zolpidem (AMBIEN) 5 MG Tab Take 1 Tab by mouth at bedtime as needed for Sleep (1 to 2 po qhs prn insomnia/sleep study. Bring to sleep study.) for up to 2 doses. 2 Tab 0   • metoprolol SR (TOPROL XL) 50 MG TABLET SR 24 HR Take 1 Tab by mouth every day. 90 Tab 3   • vitamin D (CHOLECALCIFEROL) 1000 UNIT Tab Take 1,000 Units by mouth every day.     • rabeprazole (ACIPHEX) 20 MG tablet Take 1 Tab by mouth every day.     • lisinopril (PRINIVIL) 10 MG Tab TAKE 1 TABLET BY MOUTH EVERY DAY 90 Tab 3   • simvastatin (ZOCOR) 20 MG Tab TAKE 1 TABLET BY MOUTH EVERY DAY IN THE EVENING 90 Tab 3   • MAGNESIUM-OXIDE 400 (241.3 Mg) MG Tab tablet Take 400 mg by mouth 3 times a day.  0   • aspirin 81 MG EC tablet Take 1 Tab by mouth every day. 30 Tab 0   • levothyroxine (SYNTHROID) 200 MCG Tab Take 200 mcg by mouth Every morning on an empty stomach.     • [DISCONTINUED] metoprolol SR (TOPROL XL) 25 MG TABLET SR 24 HR      • [DISCONTINUED] furosemide (LASIX) 20 MG Tab Take 0.5 Tabs by mouth every day. 45 Tab 3   • [DISCONTINUED] spironolactone (ALDACTONE) 25 MG Tab Take 0.5 Tabs by mouth every day. 45 Tab 3     No facility-administered encounter medications on file as of 7/8/2020.      Review of Systems   Constitutional: Positive for malaise/fatigue. Negative for fever.   Respiratory: Positive for shortness of breath. Negative for cough.    Cardiovascular: Positive for leg swelling (occ). Negative for chest pain, palpitations, orthopnea, claudication and PND.   Gastrointestinal: Negative for abdominal pain.   Musculoskeletal: Negative for myalgias.   Neurological: Negative for dizziness.   All other systems reviewed and are negative.       Objective:   /74 (BP Location: Left arm, Patient  "Position: Sitting, BP Cuff Size: Adult)   Pulse (!) 56   Ht 1.575 m (5' 2\")   Wt 104.3 kg (230 lb)   SpO2 94%   BMI 42.07 kg/m²     Physical Exam   Constitutional: She is oriented to person, place, and time. She appears well-developed and well-nourished.   HENT:   Head: Normocephalic and atraumatic.   Eyes: Pupils are equal, round, and reactive to light. EOM are normal.   Neck: Normal range of motion. Neck supple. No JVD present.   Cardiovascular: Normal rate, regular rhythm and normal heart sounds.   Pulmonary/Chest: Effort normal and breath sounds normal. No respiratory distress. She has no wheezes. She has no rales.   Abdominal: Soft. Bowel sounds are normal.   Musculoskeletal:         General: No edema.   Neurological: She is alert and oriented to person, place, and time.   Skin: Skin is warm and dry.   Psychiatric: She has a normal mood and affect. Her behavior is normal.   Vitals reviewed.    No results found for: CHOLSTRLTOT, LDL, HDL, TRIGLYCERIDE    Lab Results   Component Value Date/Time    SODIUM 136 07/06/2020 01:18 PM    SODIUM 137 10/02/2019 02:29 AM    POTASSIUM 5.2 07/06/2020 01:18 PM    POTASSIUM 4.3 10/02/2019 02:29 AM    CHLORIDE 97 07/06/2020 01:18 PM    CHLORIDE 100 10/02/2019 02:29 AM    CO2 21 07/06/2020 01:18 PM    CO2 26 10/02/2019 02:29 AM    GLUCOSE 89 07/06/2020 01:18 PM    GLUCOSE 96 10/02/2019 02:29 AM    BUN 30 (H) 07/06/2020 01:18 PM    BUN 40 (H) 10/02/2019 02:29 AM    CREATININE 2.02 (H) 07/06/2020 01:18 PM    CREATININE 1.03 10/02/2019 02:29 AM    BUNCREATRAT 15 07/06/2020 01:18 PM     Lab Results   Component Value Date/Time    ALKPHOSPHAT 60 10/01/2019 12:36 AM    ASTSGOT 24 10/01/2019 12:36 AM    ALTSGPT 23 10/01/2019 12:36 AM    TBILIRUBIN 0.9 10/01/2019 12:36 AM      Transthoracic Echo Report 9/27/2019  No prior study is available for comparison.   Severely reduced left ventricular systolic function. Left ventricular   ejection fraction is visually estimated to be 20%. "   Grade I diastolic dysfunction.  Mildly dilated right ventricle. Reduced right ventricular systolic   function.  Normal inferior vena cava size and inspiratory collapse.    Heart cath 10/1/2019  Conclusions    1. There is minimal  coronary artery disease with one vessel disease.    2. Normal LVEDP.    Recommendations    * Continue medical management and risk factor modification.     Transthoracic Echo Report 1/15/2020-results reviewed with patient  Normal left ventricular systolic function. Left ventricular ejection   fraction is visually estimated to be 55%.  Indeterminate diastolic function.  Mildly dilated right ventricle. Reduced right ventricular systolic   function.  Normal inferior vena cava size and inspiratory collapse.  Estimated right ventricular systolic pressure  is 36 mmHg.    Assessment:     1. Congestive heart failure, NYHA class 2 and ACC/AHA stage C ([facility])  furosemide (LASIX) 20 MG Tab    Basic Metabolic Panel    Comp Metabolic Panel    Lipid Profile    REFERRAL TO SLEEP STUDIES   2. Heart failure, NYHA class 2 ([facility])  furosemide (LASIX) 20 MG Tab    Basic Metabolic Panel    Comp Metabolic Panel    Lipid Profile    REFERRAL TO SLEEP STUDIES   3. High risk medication use  Basic Metabolic Panel    Comp Metabolic Panel    Lipid Profile    REFERRAL TO SLEEP STUDIES   4. Coronary artery disease involving native coronary artery of native heart without angina pectoris  Comp Metabolic Panel    Lipid Profile    REFERRAL TO SLEEP STUDIES   5. Mixed hyperlipidemia  Comp Metabolic Panel    Lipid Profile    REFERRAL TO SLEEP STUDIES   6. EDWARD (obstructive sleep apnea)  REFERRAL TO SLEEP STUDIES   7. ISRRAEL (acute kidney injury) ([facility])     8. Non-ischemic cardiomyopathy ([facility])     9. Apnea     10. HTN (hypertension), malignant     11. Obesity, Class III, BMI 40-49.9 (morbid obesity) ([facility])         Medical Decision Making:  Today's Assessment / Status / Plan:   1. HFrEF, Stage C, Class 2, LVEF 55% improved from 20%:  Based on physical examination findings, patient is euvolemic. No JVD, lungs are clear to auscultation, no pitting edema in bilateral lower extremities, no ascites.  -Reviewed lab testing with patient, her kidney function has worsened.  -Patient to stop spironolactone  -Patient encouraged to take furosemide 10 to 20 mg as needed  -Continue lisinopril 10 mg daily  -Continue Toprol-XL 50 mg daily  -BMP in 2 weeks (will follow, pt may need a referral to Neph)  -No indication for ICD with improvement of her EF to 55%  -Reinforced s/sx of worsening heart failure with patient and weight monitoring. Pt verbalizes understanding. Pt to call office or RTC if present.   -Recommend sleep study, new referral placed. Pt has dilated right ventricle and history of abnormal OPO (pt reports her  states she snores and has apneic periods in sleep).  Patient to call our clinic in 1 week if not contacted to schedule for sleep study    2.  Hypertension: Stable  -Continue recommendations per above    3.  CAD/hyperlipidemia: Minimal CAD per angiogram  -Patient has an allergy to atorvastatin  -Continue simvastatin 20 mg daily (patient reports she has been tolerating simvastatin)  -Continue aspirin 81 mg daily  -Obtain a CMP and lipid panel before her visit with Dr. Sebastian  -Continue smoking cessation    4.  Obesity: BMI 42.07  -Patient reports difficulty losing weight  -Encouraged weight loss    FU in clinic in 3 months to establish with Dr. Sebastian, with CMP and lipid panel.  Sooner if needed.    Patient verbalizes understanding and agrees with the plan of care.     Collaborating MD: ROLA Horton MD

## 2020-07-08 NOTE — PATIENT INSTRUCTIONS
Start monitoring weights at home daily. Call office if weight increasing greater than 3 lbs in 1 day or greater than 5 lbs in 1 week.   Stop Spironolactone   Take furosemide 10-20 mg daily as needed for swelling, weight gain or SOB.

## 2020-07-22 DIAGNOSIS — I50.9 HEART FAILURE, NYHA CLASS 2 (HCC): ICD-10-CM

## 2020-07-22 DIAGNOSIS — I50.9 CONGESTIVE HEART FAILURE, NYHA CLASS 2 AND ACC/AHA STAGE C (HCC): ICD-10-CM

## 2020-07-22 DIAGNOSIS — Z79.899 HIGH RISK MEDICATION USE: ICD-10-CM

## 2020-08-05 ENCOUNTER — TELEPHONE (OUTPATIENT)
Dept: CARDIOLOGY | Facility: MEDICAL CENTER | Age: 60
End: 2020-08-05

## 2020-08-05 NOTE — TELEPHONE ENCOUNTER
Sleep Study referral sent to [facility] by contact center.     Sleep study placed into media and copy mailed to patient.     Pt called to notify of above. She states understanding and that she will be contacted re: scheduling f/u appt for CPAP and f/u sleep medicine care. Pt is appreciative of call.

## 2020-10-05 DIAGNOSIS — Z79.899 HIGH RISK MEDICATION USE: ICD-10-CM

## 2020-10-05 DIAGNOSIS — I50.9 HEART FAILURE, NYHA CLASS 2 (HCC): ICD-10-CM

## 2020-10-05 DIAGNOSIS — I50.9 CONGESTIVE HEART FAILURE, NYHA CLASS 2 AND ACC/AHA STAGE C (HCC): ICD-10-CM

## 2020-10-05 DIAGNOSIS — E78.2 MIXED HYPERLIPIDEMIA: ICD-10-CM

## 2020-10-05 DIAGNOSIS — I25.10 CORONARY ARTERY DISEASE INVOLVING NATIVE CORONARY ARTERY OF NATIVE HEART WITHOUT ANGINA PECTORIS: ICD-10-CM

## 2020-10-12 ENCOUNTER — OFFICE VISIT (OUTPATIENT)
Dept: CARDIOLOGY | Facility: MEDICAL CENTER | Age: 60
End: 2020-10-12
Payer: MEDICAID

## 2020-10-12 VITALS
WEIGHT: 236 LBS | BODY MASS INDEX: 43.43 KG/M2 | SYSTOLIC BLOOD PRESSURE: 118 MMHG | HEART RATE: 63 BPM | OXYGEN SATURATION: 96 % | HEIGHT: 62 IN | DIASTOLIC BLOOD PRESSURE: 74 MMHG

## 2020-10-12 DIAGNOSIS — I51.7 RIGHT VENTRICULAR DILATION: ICD-10-CM

## 2020-10-12 DIAGNOSIS — Z87.891 FORMER TOBACCO USE: ICD-10-CM

## 2020-10-12 DIAGNOSIS — G47.33 OSA (OBSTRUCTIVE SLEEP APNEA): ICD-10-CM

## 2020-10-12 DIAGNOSIS — J43.2 CENTRILOBULAR EMPHYSEMA (HCC): ICD-10-CM

## 2020-10-12 DIAGNOSIS — E78.2 MIXED HYPERLIPIDEMIA: ICD-10-CM

## 2020-10-12 DIAGNOSIS — I42.8 NON-ISCHEMIC CARDIOMYOPATHY (HCC): ICD-10-CM

## 2020-10-12 DIAGNOSIS — I10 ESSENTIAL HYPERTENSION: ICD-10-CM

## 2020-10-12 DIAGNOSIS — I25.10 CORONARY ARTERY DISEASE INVOLVING NATIVE CORONARY ARTERY OF NATIVE HEART WITHOUT ANGINA PECTORIS: ICD-10-CM

## 2020-10-12 PROCEDURE — 99214 OFFICE O/P EST MOD 30 MIN: CPT | Performed by: INTERNAL MEDICINE

## 2020-10-12 RX ORDER — ALBUTEROL SULFATE 90 UG/1
1 AEROSOL, METERED RESPIRATORY (INHALATION)
Status: DISCONTINUED | OUTPATIENT
Start: 2020-10-12 | End: 2021-04-12

## 2020-10-12 RX ORDER — RABEPRAZOLE SODIUM 20 MG/1
20 TABLET, DELAYED RELEASE ORAL DAILY
Qty: 30 TAB | Refills: 11 | Status: SHIPPED | OUTPATIENT
Start: 2020-10-12 | End: 2021-04-12

## 2020-10-12 RX ORDER — LOSARTAN POTASSIUM 25 MG/1
25 TABLET ORAL DAILY
Qty: 30 TAB | Refills: 11 | Status: SHIPPED | OUTPATIENT
Start: 2020-10-12 | End: 2021-10-21

## 2020-10-12 RX ORDER — MULTIVIT WITH MINERALS/LUTEIN
2000 TABLET ORAL DAILY
Status: ON HOLD | COMMUNITY
End: 2022-05-09

## 2020-10-12 ASSESSMENT — FIBROSIS 4 INDEX: FIB4 SCORE: 1.07

## 2020-10-12 NOTE — PROGRESS NOTES
Subjective:   Chief Complaint:   Chief Complaint   Patient presents with   • Cardiomyopathy (Non-ischemic)   • Congestive Heart Failure     F/V Dx: ACC/AHA stage C systolic heart failure & Congestive heart failure, NYHA class 2 and ACC/AHA stage C    • Coronary Artery Disease     F/V Dx: Coronary artery disease involving native coronary artery of native heart without angina pectoris   • Dyslipidemia   • Shortness of Breath       Ivelisse Montague is a 60 y.o. female who returns today for dilated CMO, non obstructive disease, PERAZA.    We during the hospital stay  for acute CHF.  EF was 20%.  LHC with nonob disease, normal LVEDP, started on meds.  RF 55%     Still has NHYC 3 PERAZA, stage C, probably multifactorial  She smoked for 40 years so thinks some of this is baseline.  Has a cough, not clear if ACE-I.  Inhaler helps.    Prior HTN but now sometimes it is low now, bottom in the 70s, bottom in the 40s.  BP good today.  We will leave meds the same.    Her kidney function declined so she was taken off of lasix and arline, did not notice any difference.  Some mild LE edema, will take lasix PRN, has not needed for a while.  Cr improved from 2 to 1.21.    Has HLP, on statin, moderate dose statin, .    Has EDWARD, waiting for CPAP.    Having anxiety which is new.    She is not limited by chest pain, pressure or tightness with activity.   No significant orthopnea or lower extremity swelling.   No significant palpitations, lightheadedness, or presyncope/syncope.   No symptoms of leg claudication.   No stroke/TIA like symptoms.    Mother had congestive heart failure but later in life in her 70s and had diabetes.  Father started having MIs in his 60s  of an MI at 63.  Brother  of MI at 23.  2 other brothers had MIs starting in their 60s and 70s.  Former tobacco, 2007.    No history of diabetes.  No history of autoimmune disease such as lupus or rheumatoid arthritis.  No chronic kidney disease.  No ETOH  overuse.  No caffeine overuse. 1-2 per day. Occasional Monster drinks.  No recreation substance use.       DATA REVIEWED by me:  ECG (my personal interpretation) 10-9-19  Sinus, 68, PAC, NS T wave changes    Holter 11-14-19  Sinus, PACs,     Echo 1-  Normal left ventricular systolic function. Left ventricular ejection   fraction is visually estimated to be 55%.  Indeterminate diastolic function.  Mildly dilated right ventricle. Reduced right ventricular systolic   function.  Normal inferior vena cava size and inspiratory collapse.  Estimated right ventricular systolic pressure  is 36 mmHg.    Echo 9-28-19  No prior study is available for comparison.   Severely reduced left ventricular systolic function. Left ventricular   ejection fraction is visually estimated to be 20%.   Grade I diastolic dysfunction.  Mildly dilated right ventricle. Reduced right ventricular systolic   function.  Normal inferior vena cava size and inspiratory collapse.    [facility] 10-1-19  Conclusions    1. There is minimal  coronary artery disease with one vessel disease.    2. Normal LVEDP.     Recommendations    * Continue medical management and risk factor modification.    Most recent labs:     [facility] 10/3/2020 , HDL 53, , , sodium 138, testing 4.5, LFTs normal, creatinine 1.21, GFR 48, glucose 106    Lab Results   Component Value Date/Time    HEMOGLOBIN 15.6 10/02/2019 02:29 AM    HEMATOCRIT 49.3 (H) 10/02/2019 02:29 AM    MCV 92.5 10/02/2019 02:29 AM    INR 0.93 09/30/2019 11:19 AM      Lab Results   Component Value Date/Time    SODIUM 136 07/06/2020 01:18 PM    SODIUM 137 10/02/2019 02:29 AM    POTASSIUM 5.2 07/06/2020 01:18 PM    POTASSIUM 4.3 10/02/2019 02:29 AM    CHLORIDE 97 07/06/2020 01:18 PM    CHLORIDE 100 10/02/2019 02:29 AM    CO2 21 07/06/2020 01:18 PM    CO2 26 10/02/2019 02:29 AM    GLUCOSE 89 07/06/2020 01:18 PM    GLUCOSE 96 10/02/2019 02:29 AM    BUN 30 (H) 07/06/2020 01:18 PM    BUN 40 (H) 10/02/2019  02:29 AM    CREATININE 2.02 (H) 2020 01:18 PM    CREATININE 1.03 10/02/2019 02:29 AM      Lab Results   Component Value Date/Time    ASTSGOT 24 10/01/2019 12:36 AM    ALTSGPT 23 10/01/2019 12:36 AM    ALBUMIN 4.4 10/01/2019 12:36 AM      No results found for: CHOLSTRLTOT, LDL, HDL, TRIGLYCERIDE        Past Medical History:   Diagnosis Date   • Back pain    • CAD (coronary artery disease)    • CHF (congestive heart failure) (HCC)    • GERD (gastroesophageal reflux disease)    • Hyperlipidemia    • Hypertension    • RLS (restless legs syndrome)    • Thyroid disease      Past Surgical History:   Procedure Laterality Date   • KNEE REPLACEMENT, TOTAL Bilateral    • LUMBAR LAMINECTOMY DISKECTOMY     • WRIST FUSION Left     x2   • ZZZ CARDIAC CATH       Family History   Problem Relation Age of Onset   • Diabetes Mother    • Lung Disease Mother    • Cancer Mother         CLL   • Osteoporosis Mother    • Asthma Mother    • Heart Attack Father    • Thyroid Sister    • Heart Disease Brother    • Diabetes Brother    • Heart Attack Brother    • Lung Disease Brother    • Bladder cancer Brother    • Heart Disease Brother    • Heart Attack Brother      Social History     Socioeconomic History   • Marital status: Single     Spouse name: Not on file   • Number of children: Not on file   • Years of education: Not on file   • Highest education level: Not on file   Occupational History   • Not on file   Social Needs   • Financial resource strain: Not on file   • Food insecurity     Worry: Not on file     Inability: Not on file   • Transportation needs     Medical: Not on file     Non-medical: Not on file   Tobacco Use   • Smoking status: Former Smoker     Packs/day: 1.00     Years: 40.00     Pack years: 40.00     Types: Cigarettes     Quit date: 2008     Years since quittin.5   • Smokeless tobacco: Never Used   • Tobacco comment: 1-1.5 PPD   Substance and Sexual Activity   • Alcohol use: No   • Drug use: No   • Sexual  activity: Not on file   Lifestyle   • Physical activity     Days per week: Not on file     Minutes per session: Not on file   • Stress: Not on file   Relationships   • Social connections     Talks on phone: Not on file     Gets together: Not on file     Attends Protestant service: Not on file     Active member of club or organization: Not on file     Attends meetings of clubs or organizations: Not on file     Relationship status: Not on file   • Intimate partner violence     Fear of current or ex partner: Not on file     Emotionally abused: Not on file     Physically abused: Not on file     Forced sexual activity: Not on file   Other Topics Concern   • Not on file   Social History Narrative   • Not on file     Allergies   Allergen Reactions   • Codeine Vomiting   • Lipitor [Atorvastatin Calcium]        Current Outpatient Medications   Medication Sig Dispense Refill   • Ascorbic Acid (VITAMIN C) 1000 MG Tab Take 1,000 mg by mouth every day.     • rabeprazole (ACIPHEX) 20 MG tablet Take 1 Tab by mouth every day. 30 Tab 11   • losartan (COZAAR) 25 MG Tab Take 1 Tab by mouth every day. 30 Tab 11   • metoprolol SR (TOPROL XL) 50 MG TABLET SR 24 HR Take 1 Tab by mouth every day. 90 Tab 3   • vitamin D (CHOLECALCIFEROL) 1000 UNIT Tab Take 1,000 Units by mouth every day.     • simvastatin (ZOCOR) 20 MG Tab TAKE 1 TABLET BY MOUTH EVERY DAY IN THE EVENING 90 Tab 3   • MAGNESIUM-OXIDE 400 (241.3 Mg) MG Tab tablet Take 400 mg by mouth 3 times a day.  0   • aspirin 81 MG EC tablet Take 1 Tab by mouth every day. 30 Tab 0   • levothyroxine (SYNTHROID) 200 MCG Tab Take 200 mcg by mouth Every morning on an empty stomach.       Current Facility-Administered Medications   Medication Dose Route Frequency Provider Last Rate Last Dose   • albuterol inhaler 1 Puff  1 Puff Inhalation Q4H PRN (RT) Yoli Sebastian M.D.           ROS  All others systems reviewed and negative.     Objective:     /74 (BP Location: Left arm, Patient  "Position: Sitting, BP Cuff Size: Adult)   Pulse 63   Ht 1.575 m (5' 2\")   Wt 107 kg (236 lb)   SpO2 96%  Body mass index is 43.16 kg/m².    General: No acute distress. Well nourished.  HEENT: EOM grossly intact, no scleral icterus, no pharyngeal erythema.   Neck:  No JVD, no bruits, trachea midline  CVS: RRR. Normal S1, S2. No M/R/G. No LE edema.  2+ radial pulses, 2+ PT pulses  Resp: CTAB. No wheezing or crackles/rhonchi. Normal respiratory effort.  Abdomen: Soft, NT, no tricia hepatomegaly, obese.  MSK/Ext: No clubbing or cyanosis.  Skin: Warm and dry, no rashes.  Neurological: CN III-XII grossly intact. No focal deficits.   Psych: A&O x 3, appropriate affect, good judgement      Assessment:     1. Non-ischemic cardiomyopathy (HCC)  Basic Metabolic Panel   2. Coronary artery disease involving native coronary artery of native heart without angina pectoris     3. EDWARD (obstructive sleep apnea)     4. Mixed hyperlipidemia     5. Former tobacco use     6. Essential hypertension     7. Right ventricular dilation     8. Centrilobular emphysema (HCC)  albuterol inhaler 1 Puff       Medical Decision Making:  Today's Assessment / Status / Plan:     -BP controlled  -PERAZA probably multifactorial given EF returned to normal, watch RV function  -CPAP pending  -, ok for nonobst disease, on statin  -Risk/benefit of ASA not clear but she will stay on it  -Stay on BB  -Change lisinopril to losartan given cough  -Talk to PCP about inhaler  -RTC 6 months with MD Summer 1 year    Written instructions given today:      -See Summer again in 6 months, get non fasting blood work just prior    -Try to get an inhaler of your own to help.    Return in about 6 months (around 4/12/2021).    It is my pleasure to participate in the care of Ms. Montague.  Please do not hesitate to contact me with questions or concerns.    Yoli Sebastian MD, [facility]  Cardiologist [facility]    Please note that this " dictation was created using voice recognition software. I have made every reasonable attempt to correct obvious errors, but it is possible there are errors of grammar and possibly content that I did not discover before finalizing the note.

## 2020-10-12 NOTE — PATIENT INSTRUCTIONS
-See Summer again in 6 months, get non fasting blood work just prior    -Try to get an inhaler of your own to help.

## 2020-10-12 NOTE — LETTER
[facility]   [address], [address]  [place] [address]  Phone: [phone]  Fax: [phone]              Ivelisse Montague  1960    Encounter Date: 10/12/2020    Yoli Sebastian M.D.          PROGRESS NOTE:  Subjective:   Chief Complaint:   Chief Complaint   Patient presents with   • Cardiomyopathy (Non-ischemic)   • Congestive Heart Failure     F/V Dx: ACC/AHA stage C systolic heart failure & Congestive heart failure, NYHA class 2 and ACC/AHA stage C    • Coronary Artery Disease     F/V Dx: Coronary artery disease involving native coronary artery of native heart without angina pectoris   • Dyslipidemia   • Shortness of Breath       Ivelisse Montague is a 60 y.o. female who returns today for dilated CMO, non obstructive disease, PERAZA.    We during the hospital stay 9-2019 for acute CHF.  EF was 20%.  LHC with nonob disease, normal LVEDP, started on meds.  RF 55% 1-2020    Still has NHYC 3 PERAZA, stage C, probably multifactorial  She smoked for 40 years so thinks some of this is baseline.  Has a cough, not clear if ACE-I.  Inhaler helps.    Prior HTN but now sometimes it is low now, bottom in the 70s, bottom in the 40s.  BP good today.  We will leave meds the same.    Her kidney function declined so she was taken off of lasix and arline, did not notice any difference.  Some mild LE edema, will take lasix PRN, has not needed for a while.  Cr improved from 2 to 1.21.    Has HLP, on statin, moderate dose statin, .    Has EDWARD, waiting for CPAP.    Having anxiety which is new.    She is not limited by chest pain, pressure or tightness with activity.   No significant orthopnea or lower extremity swelling.   No significant palpitations, lightheadedness, or presyncope/syncope.   No symptoms of leg claudication.   No stroke/TIA like symptoms.    Mother had congestive heart failure but later in life in her 70s and had diabetes.  Father started having MIs in  his 60s  of an MI at 63.  Brother  of MI at 23.  2 other brothers had MIs starting in their 60s and 70s.  Former tobacco, 2007.    No history of diabetes.  No history of autoimmune disease such as lupus or rheumatoid arthritis.  No chronic kidney disease.  No ETOH overuse.  No caffeine overuse. 1-2 per day. Occasional Monster drinks.  No recreation substance use.       DATA REVIEWED by me:  ECG (my personal interpretation) 10-9-19  Sinus, 68, PAC, NS T wave changes    Holter 19  Sinus, PACs,     Echo 1-  Normal left ventricular systolic function. Left ventricular ejection   fraction is visually estimated to be 55%.  Indeterminate diastolic function.  Mildly dilated right ventricle. Reduced right ventricular systolic   function.  Normal inferior vena cava size and inspiratory collapse.  Estimated right ventricular systolic pressure  is 36 mmHg.    Echo 19  No prior study is available for comparison.   Severely reduced left ventricular systolic function. Left ventricular   ejection fraction is visually estimated to be 20%.   Grade I diastolic dysfunction.  Mildly dilated right ventricle. Reduced right ventricular systolic   function.  Normal inferior vena cava size and inspiratory collapse.    [facility] 10-1-19  Conclusions    1. There is minimal  coronary artery disease with one vessel disease.    2. Normal LVEDP.     Recommendations    * Continue medical management and risk factor modification.    Most recent labs:     [facility] 10/3/2020 , HDL 53, , , sodium 138, testing 4.5, LFTs normal, creatinine 1.21, GFR 48, glucose 106    Lab Results   Component Value Date/Time    HEMOGLOBIN 15.6 10/02/2019 02:29 AM    HEMATOCRIT 49.3 (H) 10/02/2019 02:29 AM    MCV 92.5 10/02/2019 02:29 AM    INR 0.93 2019 11:19 AM      Lab Results   Component Value Date/Time    SODIUM 136 2020 01:18 PM    SODIUM 137 10/02/2019 02:29 AM    POTASSIUM 5.2 2020 01:18 PM    POTASSIUM 4.3  10/02/2019 02:29 AM    CHLORIDE 97 07/06/2020 01:18 PM    CHLORIDE 100 10/02/2019 02:29 AM    CO2 21 07/06/2020 01:18 PM    CO2 26 10/02/2019 02:29 AM    GLUCOSE 89 07/06/2020 01:18 PM    GLUCOSE 96 10/02/2019 02:29 AM    BUN 30 (H) 07/06/2020 01:18 PM    BUN 40 (H) 10/02/2019 02:29 AM    CREATININE 2.02 (H) 07/06/2020 01:18 PM    CREATININE 1.03 10/02/2019 02:29 AM      Lab Results   Component Value Date/Time    ASTSGOT 24 10/01/2019 12:36 AM    ALTSGPT 23 10/01/2019 12:36 AM    ALBUMIN 4.4 10/01/2019 12:36 AM      No results found for: CHOLSTRLTOT, LDL, HDL, TRIGLYCERIDE        Past Medical History:   Diagnosis Date   • Back pain    • CAD (coronary artery disease)    • CHF (congestive heart failure) (HCC)    • GERD (gastroesophageal reflux disease)    • Hyperlipidemia    • Hypertension    • RLS (restless legs syndrome)    • Thyroid disease      Past Surgical History:   Procedure Laterality Date   • KNEE REPLACEMENT, TOTAL Bilateral    • LUMBAR LAMINECTOMY DISKECTOMY     • WRIST FUSION Left     x2   • ZZZ CARDIAC CATH       Family History   Problem Relation Age of Onset   • Diabetes Mother    • Lung Disease Mother    • Cancer Mother         CLL   • Osteoporosis Mother    • Asthma Mother    • Heart Attack Father    • Thyroid Sister    • Heart Disease Brother    • Diabetes Brother    • Heart Attack Brother    • Lung Disease Brother    • Bladder cancer Brother    • Heart Disease Brother    • Heart Attack Brother      Social History     Socioeconomic History   • Marital status: Single     Spouse name: Not on file   • Number of children: Not on file   • Years of education: Not on file   • Highest education level: Not on file   Occupational History   • Not on file   Social Needs   • Financial resource strain: Not on file   • Food insecurity     Worry: Not on file     Inability: Not on file   • Transportation needs     Medical: Not on file     Non-medical: Not on file   Tobacco Use   • Smoking status: Former Smoker      Packs/day: 1.00     Years: 40.00     Pack years: 40.00     Types: Cigarettes     Quit date: 2008     Years since quittin.5   • Smokeless tobacco: Never Used   • Tobacco comment: 1-1.5 PPD   Substance and Sexual Activity   • Alcohol use: No   • Drug use: No   • Sexual activity: Not on file   Lifestyle   • Physical activity     Days per week: Not on file     Minutes per session: Not on file   • Stress: Not on file   Relationships   • Social connections     Talks on phone: Not on file     Gets together: Not on file     Attends Mandaeism service: Not on file     Active member of club or organization: Not on file     Attends meetings of clubs or organizations: Not on file     Relationship status: Not on file   • Intimate partner violence     Fear of current or ex partner: Not on file     Emotionally abused: Not on file     Physically abused: Not on file     Forced sexual activity: Not on file   Other Topics Concern   • Not on file   Social History Narrative   • Not on file     Allergies   Allergen Reactions   • Codeine Vomiting   • Lipitor [Atorvastatin Calcium]        Current Outpatient Medications   Medication Sig Dispense Refill   • Ascorbic Acid (VITAMIN C) 1000 MG Tab Take 1,000 mg by mouth every day.     • rabeprazole (ACIPHEX) 20 MG tablet Take 1 Tab by mouth every day. 30 Tab 11   • losartan (COZAAR) 25 MG Tab Take 1 Tab by mouth every day. 30 Tab 11   • metoprolol SR (TOPROL XL) 50 MG TABLET SR 24 HR Take 1 Tab by mouth every day. 90 Tab 3   • vitamin D (CHOLECALCIFEROL) 1000 UNIT Tab Take 1,000 Units by mouth every day.     • simvastatin (ZOCOR) 20 MG Tab TAKE 1 TABLET BY MOUTH EVERY DAY IN THE EVENING 90 Tab 3   • MAGNESIUM-OXIDE 400 (241.3 Mg) MG Tab tablet Take 400 mg by mouth 3 times a day.  0   • aspirin 81 MG EC tablet Take 1 Tab by mouth every day. 30 Tab 0   • levothyroxine (SYNTHROID) 200 MCG Tab Take 200 mcg by mouth Every morning on an empty stomach.       No current facility-administered  "medications for this visit.        ROS  All others systems reviewed and negative.     Objective:     /74 (BP Location: Left arm, Patient Position: Sitting, BP Cuff Size: Adult)   Pulse 63   Ht 1.575 m (5' 2\")   Wt 107 kg (236 lb)   SpO2 96%  Body mass index is 43.16 kg/m².    General: No acute distress. Well nourished.  HEENT: EOM grossly intact, no scleral icterus, no pharyngeal erythema.   Neck:  No JVD, no bruits, trachea midline  CVS: RRR. Normal S1, S2. No M/R/G. No LE edema.  2+ radial pulses, 2+ PT pulses  Resp: CTAB. No wheezing or crackles/rhonchi. Normal respiratory effort.  Abdomen: Soft, NT, no tricia hepatomegaly, obese.  MSK/Ext: No clubbing or cyanosis.  Skin: Warm and dry, no rashes.  Neurological: CN III-XII grossly intact. No focal deficits.   Psych: A&O x 3, appropriate affect, good judgement      Assessment:     1. Non-ischemic cardiomyopathy (HCC)  Basic Metabolic Panel   2. Coronary artery disease involving native coronary artery of native heart without angina pectoris     3. EDWARD (obstructive sleep apnea)     4. Mixed hyperlipidemia     5. Former tobacco use     6. Essential hypertension     7. Right ventricular dilation         Medical Decision Making:  Today's Assessment / Status / Plan:     -BP controlled  -PERAZA probably multifactorial given EF returned to normal, watch RV function  -CPAP pending  -, ok for nonobst disease, on statin  -Risk/benefit of ASA not clear but she will stay on it  -Stay on BB  -Change lisinopril to losartan given cough  -Talk to PCP about inhaler  -RTC 6 months with MD Summer 1 year    Written instructions given today:      -See Summer again in 6 months, get non fasting blood work just prior    -Try to get an inhaler of your own to help.    Return in about 6 months (around 4/12/2021).    It is my pleasure to participate in the care of Ms. Montague.  Please do not hesitate to contact me with questions or concerns.    Yoli Sebastian MD, " [facility]  Cardiologist [facility]    Please note that this dictation was created using voice recognition software. I have made every reasonable attempt to correct obvious errors, but it is possible there are errors of grammar and possibly content that I did not discover before finalizing the note.      Sly Khan M.D.  [address] [address]  Via Fax: [phone]

## 2020-10-13 ENCOUNTER — TELEPHONE (OUTPATIENT)
Dept: CARDIOLOGY | Facility: MEDICAL CENTER | Age: 60
End: 2020-10-13

## 2020-10-13 NOTE — TELEPHONE ENCOUNTER
Ivelisse Montague (Key: [id number]) - [id number]  RABEprazole Sodium 20MG dr tablets  Status: Sent to Plan  Created: October 12th, 2020 [phone]  Sent: October 13th, 2020  Open  [place] as not sent  Archive

## 2021-01-21 DIAGNOSIS — I50.9 CONGESTIVE HEART FAILURE, NYHA CLASS 2 AND ACC/AHA STAGE C (HCC): ICD-10-CM

## 2021-01-21 RX ORDER — METOPROLOL SUCCINATE 50 MG/1
TABLET, EXTENDED RELEASE ORAL
Qty: 90 TAB | Refills: 1 | Status: SHIPPED | OUTPATIENT
Start: 2021-01-21 | End: 2021-08-02

## 2021-03-30 ENCOUNTER — TELEPHONE (OUTPATIENT)
Dept: CARDIOLOGY | Facility: MEDICAL CENTER | Age: 61
End: 2021-03-30

## 2021-03-30 NOTE — TELEPHONE ENCOUNTER
Ivelisse Montague (Key: [id number]) - [id number] help? Call us at [phone]  Status  Sent to [facility]  Next Steps  The plan will fax you a determination, typically within 1 to 5 business days.    How do I follow up?  Drug  RABEprazole Sodium 20MG dr tablets  Form  [facility] Medical Necessity Request Form  Medical Necessity Request Form for [employer] [place] Medicaid Members Only.  [phone]  [phone]  Original Claim Info  [age] use omeprazole, pantoprazolePA MD Darcy Call [phone] DS SUBMIT PA# [phone] PA TYPE 8

## 2021-04-08 DIAGNOSIS — I42.8 NON-ISCHEMIC CARDIOMYOPATHY (HCC): ICD-10-CM

## 2021-04-12 ENCOUNTER — OFFICE VISIT (OUTPATIENT)
Dept: CARDIOLOGY | Facility: MEDICAL CENTER | Age: 61
End: 2021-04-12
Payer: MEDICAID

## 2021-04-12 VITALS
DIASTOLIC BLOOD PRESSURE: 78 MMHG | HEART RATE: 59 BPM | OXYGEN SATURATION: 97 % | HEIGHT: 62 IN | WEIGHT: 261 LBS | BODY MASS INDEX: 48.03 KG/M2 | SYSTOLIC BLOOD PRESSURE: 122 MMHG

## 2021-04-12 DIAGNOSIS — G47.33 OSA (OBSTRUCTIVE SLEEP APNEA): ICD-10-CM

## 2021-04-12 DIAGNOSIS — I50.20 ACC/AHA STAGE C SYSTOLIC HEART FAILURE (HCC): ICD-10-CM

## 2021-04-12 DIAGNOSIS — E78.2 MIXED HYPERLIPIDEMIA: ICD-10-CM

## 2021-04-12 DIAGNOSIS — I50.9 HEART FAILURE, NYHA CLASS 2 (HCC): ICD-10-CM

## 2021-04-12 DIAGNOSIS — Z87.891 FORMER TOBACCO USE: ICD-10-CM

## 2021-04-12 DIAGNOSIS — E03.9 ACQUIRED HYPOTHYROIDISM: ICD-10-CM

## 2021-04-12 DIAGNOSIS — I10 HTN (HYPERTENSION), MALIGNANT: ICD-10-CM

## 2021-04-12 DIAGNOSIS — Z79.899 HIGH RISK MEDICATION USE: ICD-10-CM

## 2021-04-12 PROCEDURE — 99214 OFFICE O/P EST MOD 30 MIN: CPT | Performed by: NURSE PRACTITIONER

## 2021-04-12 RX ORDER — OMEPRAZOLE 40 MG/1
CAPSULE, DELAYED RELEASE ORAL DAILY
Status: ON HOLD | COMMUNITY
Start: 2021-04-08 | End: 2022-05-09

## 2021-04-12 RX ORDER — FLUTICASONE FUROATE 100 UG/1
2 POWDER RESPIRATORY (INHALATION) EVERY MORNING
COMMUNITY
Start: 2021-04-09

## 2021-04-12 RX ORDER — SIMVASTATIN 20 MG
20 TABLET ORAL
Qty: 90 TABLET | Refills: 3 | Status: SHIPPED | OUTPATIENT
Start: 2021-04-12 | End: 2021-11-05 | Stop reason: CLARIF

## 2021-04-12 RX ORDER — ALBUTEROL SULFATE 90 UG/1
2 AEROSOL, METERED RESPIRATORY (INHALATION) EVERY 6 HOURS PRN
COMMUNITY

## 2021-04-12 ASSESSMENT — ENCOUNTER SYMPTOMS
PALPITATIONS: 0
ORTHOPNEA: 0
MYALGIAS: 0
DIZZINESS: 0
ABDOMINAL PAIN: 0
COUGH: 0
PND: 0
FEVER: 0
CLAUDICATION: 0
SHORTNESS OF BREATH: 1

## 2021-04-12 ASSESSMENT — FIBROSIS 4 INDEX: FIB4 SCORE: 1.08

## 2021-04-12 NOTE — PROGRESS NOTES
No chief complaint on file.      Subjective:   Ivelisse Montague is a 61 y.o. female who presents today for follow-up on her heart failure.    Patient of the heart failure clinic.  She was last seen in on 10/12/2020 with Dr. Sebastian.  During that visit, her lisinopril was switched over to losartan.    Patient reports doing well since her last visit, she does continue to have shortness of breath, but denies chest pain, palpitations, orthopnea, PND, edema or dizziness/lightheadedness.    She is still pending approval of her CPAP, she had some problems due to insurance changes.  Hopefully, she will hear from the DME company soon.    Patient reports she has gained some weight.    Patient reports not staying hydrated.  Her kidney function is slightly worse from her baseline.    She reports she has been off of spironolactone since November, did not have any more refills and was not sure if this med was discontinued.    Additonally, patient has the following medical problems:    -Hospitalization from 9/27/2019 through 10/2/2019.  Patient came reporting lower extremity edema and shortness of breath.  She was found to have an LVEF of 20%, was sent for coronary angiogram which showed only mild CAD.  Patient was also treated for hypertension.  Patient was diuresed, and sent home on medical therapy.    -Hyperlipidemia    -GERD    -Hypothyroidism    -Chronic back pain    -Former smoker: Quit 2008, smoked for 45 years    Past Medical History:   Diagnosis Date   • Back pain    • CAD (coronary artery disease)    • CHF (congestive heart failure) ([facility])    • GERD (gastroesophageal reflux disease)    • Hyperlipidemia    • Hypertension    • RLS (restless legs syndrome)    • Thyroid disease      Past Surgical History:   Procedure Laterality Date   • KNEE REPLACEMENT, TOTAL Bilateral    • LUMBAR LAMINECTOMY DISKECTOMY     • WRIST FUSION Left     x2   • ZZZ CARDIAC CATH       Family History   Problem Relation Age of Onset   • Diabetes  Mother    • Lung Disease Mother    • Cancer Mother         CLL   • Osteoporosis Mother    • Asthma Mother    • Heart Attack Father    • Thyroid Sister    • Heart Disease Brother    • Diabetes Brother    • Heart Attack Brother    • Lung Disease Brother    • Bladder cancer Brother    • Heart Disease Brother    • Heart Attack Brother      Social History     Socioeconomic History   • Marital status: Single     Spouse name: Not on file   • Number of children: Not on file   • Years of education: Not on file   • Highest education level: Not on file   Occupational History   • Not on file   Tobacco Use   • Smoking status: Former Smoker     Packs/day: 1.00     Years: 40.00     Pack years: 40.00     Types: Cigarettes     Quit date: 2008     Years since quittin.0   • Smokeless tobacco: Never Used   • Tobacco comment: 1-1.5 PPD   Substance and Sexual Activity   • Alcohol use: No   • Drug use: No   • Sexual activity: Not on file   Other Topics Concern   • Not on file   Social History Narrative   • Not on file     Social Determinants of Health     Financial Resource Strain:    • Difficulty of Paying Living Expenses:    Food Insecurity:    • Worried About Running Out of Food in the Last Year:    • Ran Out of Food in the Last Year:    Transportation Needs:    • Lack of Transportation (Medical):    • Lack of Transportation (Non-Medical):    Physical Activity:    • Days of Exercise per Week:    • Minutes of Exercise per Session:    Stress:    • Feeling of Stress :    Social Connections:    • Frequency of Communication with Friends and Family:    • Frequency of Social Gatherings with Friends and Family:    • Attends Islam Services:    • Active Member of Clubs or Organizations:    • Attends Club or Organization Meetings:    • Marital Status:    Intimate Partner Violence:    • Fear of Current or Ex-Partner:    • Emotionally Abused:    • Physically Abused:    • Sexually Abused:      Allergies   Allergen Reactions   • Codeine  Vomiting   • Lipitor [Atorvastatin Calcium]      Outpatient Encounter Medications as of 4/12/2021   Medication Sig Dispense Refill   • ARNUITY ELLIPTA 100 MCG/ACT AEROSOL POWDER, BREATH ACTIVATED Inhale every day.     • omeprazole (PRILOSEC) 40 MG delayed-release capsule      • albuterol 108 (90 Base) MCG/ACT Aero Soln inhalation aerosol Inhale 2 Puffs every 6 hours as needed.     • simvastatin (ZOCOR) 20 MG Tab Take 1 tablet by mouth every day. N THE EVENING 90 tablet 3   • metoprolol SR (TOPROL XL) 50 MG TABLET SR 24 HR TAKE 1 TABLET BY MOUTH EVERY DAY 90 Tab 1   • Ascorbic Acid (VITAMIN C) 1000 MG Tab Take 1,000 mg by mouth every day.     • losartan (COZAAR) 25 MG Tab Take 1 Tab by mouth every day. 30 Tab 11   • vitamin D (CHOLECALCIFEROL) 1000 UNIT Tab Take 1,000 Units by mouth every day.     • MAGNESIUM-OXIDE 400 (241.3 Mg) MG Tab tablet Take 400 mg by mouth 3 times a day.  0   • aspirin 81 MG EC tablet Take 1 Tab by mouth every day. 30 Tab 0   • levothyroxine (SYNTHROID) 200 MCG Tab Take 200 mcg by mouth Every morning on an empty stomach.     • [DISCONTINUED] rabeprazole (ACIPHEX) 20 MG tablet Take 1 Tab by mouth every day. (Patient not taking: Reported on 4/12/2021) 30 Tab 11   • [DISCONTINUED] simvastatin (ZOCOR) 20 MG Tab TAKE 1 TABLET BY MOUTH EVERY DAY IN THE EVENING (Patient not taking: Reported on 4/12/2021) 90 Tab 3   • [DISCONTINUED] albuterol inhaler 1 Puff        No facility-administered encounter medications on file as of 4/12/2021.     Review of Systems   Constitutional: Negative for fever and malaise/fatigue.   Respiratory: Positive for shortness of breath. Negative for cough.    Cardiovascular: Negative for chest pain, palpitations, orthopnea, claudication, leg swelling and PND.   Gastrointestinal: Negative for abdominal pain.   Musculoskeletal: Negative for myalgias.   Neurological: Negative for dizziness.   All other systems reviewed and are negative.       Objective:   /78 (BP Location:  "Left arm, Patient Position: Sitting, BP Cuff Size: Adult)   Pulse (!) 59   Ht 1.575 m (5' 2\")   Wt 118 kg (261 lb)   SpO2 97%   BMI 47.74 kg/m²     Physical Exam   Constitutional: She is oriented to person, place, and time. She appears well-developed and well-nourished.   HENT:   Head: Normocephalic and atraumatic.   Eyes: Pupils are equal, round, and reactive to light. EOM are normal.   Neck: No JVD present.   Cardiovascular: Normal rate, regular rhythm and normal heart sounds.   Pulmonary/Chest: Effort normal and breath sounds normal. No respiratory distress. She has no wheezes. She has no rales.   Abdominal: Soft. Bowel sounds are normal.   Musculoskeletal:         General: No edema.      Cervical back: Normal range of motion and neck supple.   Neurological: She is alert and oriented to person, place, and time.   Skin: Skin is warm and dry.   Psychiatric: She has a normal mood and affect. Her behavior is normal.   Vitals reviewed.    No results found for: CHOLSTRLTOT, LDL, HDL, TRIGLYCERIDE    Lab Results   Component Value Date/Time    SODIUM 136 07/06/2020 01:18 PM    SODIUM 137 10/02/2019 02:29 AM    POTASSIUM 5.2 07/06/2020 01:18 PM    POTASSIUM 4.3 10/02/2019 02:29 AM    CHLORIDE 97 07/06/2020 01:18 PM    CHLORIDE 100 10/02/2019 02:29 AM    CO2 21 07/06/2020 01:18 PM    CO2 26 10/02/2019 02:29 AM    GLUCOSE 89 07/06/2020 01:18 PM    GLUCOSE 96 10/02/2019 02:29 AM    BUN 30 (H) 07/06/2020 01:18 PM    BUN 40 (H) 10/02/2019 02:29 AM    CREATININE 2.02 (H) 07/06/2020 01:18 PM    CREATININE 1.03 10/02/2019 02:29 AM    BUNCREATRAT 15 07/06/2020 01:18 PM     Lab Results   Component Value Date/Time    ALKPHOSPHAT 60 10/01/2019 12:36 AM    ASTSGOT 24 10/01/2019 12:36 AM    ALTSGPT 23 10/01/2019 12:36 AM    TBILIRUBIN 0.9 10/01/2019 12:36 AM      Transthoracic Echo Report 9/27/2019  No prior study is available for comparison.   Severely reduced left ventricular systolic function. Left ventricular   ejection " fraction is visually estimated to be 20%.   Grade I diastolic dysfunction.  Mildly dilated right ventricle. Reduced right ventricular systolic   function.  Normal inferior vena cava size and inspiratory collapse.    Heart cath 10/1/2019  Conclusions    1. There is minimal  coronary artery disease with one vessel disease.    2. Normal LVEDP.    Recommendations    * Continue medical management and risk factor modification.     Transthoracic Echo Report 1/15/2020  Normal left ventricular systolic function. Left ventricular ejection   fraction is visually estimated to be 55%.  Indeterminate diastolic function.  Mildly dilated right ventricle. Reduced right ventricular systolic   function.  Normal inferior vena cava size and inspiratory collapse.  Estimated right ventricular systolic pressure  is 36 mmHg.    Assessment:     1. Mixed hyperlipidemia  simvastatin (ZOCOR) 20 MG Tab    Basic Metabolic Panel    Comp Metabolic Panel    Lipid Profile   2. HTN (hypertension), malignant  Basic Metabolic Panel    Comp Metabolic Panel    Lipid Profile   3. High risk medication use  Basic Metabolic Panel    TSH WITH REFLEX TO FT4    Comp Metabolic Panel    Lipid Profile   4. Acquired hypothyroidism  TSH WITH REFLEX TO FT4   5. ACC/AHA stage C systolic heart failure (HCC)     6. Heart failure, NYHA class 2 (HCC)     7. Former tobacco use     8. EDWARD (obstructive sleep apnea)         Medical Decision Making:  Today's Assessment / Status / Plan:   1. HFrEF, Stage C, Class 2, LVEF 55% improved from 20%: Based on physical examination findings, patient is euvolemic. No JVD, lungs are clear to auscultation, no pitting edema in bilateral lower extremities, no ascites.  -Reviewed lab testing with patient, her kidney function has worsened.  -Encourage hydration  -Repeat BMP in 1 to 2 weeks, if kidney function continues to worsen, will refer over to nephrology  -Continue losartan 25 mg daily  -Continue Toprol-XL 50 mg daily  -Previously taken  off of spironolactone due to kidney insufficiency  -No indication for ICD with improvement of her EF to 55%  -Reinforced s/sx of worsening heart failure with patient and weight monitoring. Pt verbalizes understanding. Pt to call office or RTC if present.     2.  Hypertension: Stable  -Continue recommendations per above    3.  CAD/hyperlipidemia: Minimal CAD per angiogram  -Patient has an allergy to atorvastatin  -Restart and continue simvastatin 20 mg daily (patient reports she has been tolerating simvastatin)  -Continue aspirin 81 mg daily  -Obtain a CMP and lipid panel in 6 months after BMP in the next 1 to 2 weeks  -Continue smoking cessation    4.  Obesity: BMI 47.74  -Encouraged weight loss    5.  Sleep apnea:  -Encouraged regular CPAP use, patient to follow-up if she does not hear from [employer] within the week.    6.  Hypothyroidism:  -Will check thyroid function with upcoming labs  -Continue levothyroxine, followed by PCP    FU in clinic in 6 months with Dr. Sebastian with CMP and lipid panel.  Sooner if needed.    Patient verbalizes understanding and agrees with the plan of care.     PLEASE NOTE: This Note was created using voice recognition Software. I have made every reasonable attempt to correct obvious errors, but I expect that there are errors of grammar and possibly content that I did not discover before finalizing the note

## 2021-04-12 NOTE — PATIENT INSTRUCTIONS
Restart Simvastatin 20 mg daily at night  BMP and thyroid panel in 1-2 weeks    CMP and LIPID panel in 6 months     Work on CPAP

## 2021-05-13 DIAGNOSIS — I10 HTN (HYPERTENSION), MALIGNANT: ICD-10-CM

## 2021-05-13 DIAGNOSIS — E78.2 MIXED HYPERLIPIDEMIA: ICD-10-CM

## 2021-05-13 DIAGNOSIS — E03.9 ACQUIRED HYPOTHYROIDISM: ICD-10-CM

## 2021-05-13 DIAGNOSIS — Z79.899 HIGH RISK MEDICATION USE: ICD-10-CM

## 2021-05-19 NOTE — RESULT ENCOUNTER NOTE
Can patient contact her PCP to talk about her Thyroid. Her kidney function has improved. Nor further recommendations at this time. Pt to follow up as discussed with CMP and lipid panel prior.

## 2021-08-02 DIAGNOSIS — I50.9 CONGESTIVE HEART FAILURE, NYHA CLASS 2 AND ACC/AHA STAGE C (HCC): ICD-10-CM

## 2021-08-03 RX ORDER — METOPROLOL SUCCINATE 50 MG/1
TABLET, EXTENDED RELEASE ORAL
Qty: 90 TABLET | Refills: 3 | Status: SHIPPED | OUTPATIENT
Start: 2021-08-03 | End: 2022-08-16

## 2021-10-21 DIAGNOSIS — I10 HTN (HYPERTENSION), MALIGNANT: ICD-10-CM

## 2021-10-21 RX ORDER — LOSARTAN POTASSIUM 25 MG/1
TABLET ORAL
Qty: 90 TABLET | Refills: 2 | Status: SHIPPED | OUTPATIENT
Start: 2021-10-21 | End: 2022-08-16

## 2021-10-25 ENCOUNTER — OFFICE VISIT (OUTPATIENT)
Dept: CARDIOLOGY | Facility: MEDICAL CENTER | Age: 61
End: 2021-10-25
Payer: MEDICAID

## 2021-10-25 VITALS
HEART RATE: 65 BPM | OXYGEN SATURATION: 92 % | DIASTOLIC BLOOD PRESSURE: 100 MMHG | WEIGHT: 259.6 LBS | SYSTOLIC BLOOD PRESSURE: 132 MMHG | BODY MASS INDEX: 47.77 KG/M2 | RESPIRATION RATE: 14 BRPM | HEIGHT: 62 IN

## 2021-10-25 DIAGNOSIS — I25.10 CORONARY ARTERY DISEASE INVOLVING NATIVE CORONARY ARTERY OF NATIVE HEART WITHOUT ANGINA PECTORIS: ICD-10-CM

## 2021-10-25 DIAGNOSIS — Z79.899 HIGH RISK MEDICATION USE: ICD-10-CM

## 2021-10-25 DIAGNOSIS — G47.33 OSA (OBSTRUCTIVE SLEEP APNEA): ICD-10-CM

## 2021-10-25 DIAGNOSIS — I50.20 ACC/AHA STAGE C SYSTOLIC HEART FAILURE (HCC): ICD-10-CM

## 2021-10-25 DIAGNOSIS — E78.2 MIXED HYPERLIPIDEMIA: ICD-10-CM

## 2021-10-25 DIAGNOSIS — E66.01 OBESITY, CLASS III, BMI 40-49.9 (MORBID OBESITY) (HCC): ICD-10-CM

## 2021-10-25 DIAGNOSIS — I10 HTN (HYPERTENSION), MALIGNANT: ICD-10-CM

## 2021-10-25 DIAGNOSIS — Z87.891 FORMER TOBACCO USE: ICD-10-CM

## 2021-10-25 DIAGNOSIS — E03.9 ACQUIRED HYPOTHYROIDISM: ICD-10-CM

## 2021-10-25 DIAGNOSIS — I42.8 NON-ISCHEMIC CARDIOMYOPATHY (HCC): ICD-10-CM

## 2021-10-25 DIAGNOSIS — I50.9 HEART FAILURE, NYHA CLASS 2 (HCC): ICD-10-CM

## 2021-10-25 DIAGNOSIS — R07.89 OTHER CHEST PAIN: ICD-10-CM

## 2021-10-25 PROCEDURE — 93000 ELECTROCARDIOGRAM COMPLETE: CPT | Performed by: INTERNAL MEDICINE

## 2021-10-25 PROCEDURE — 99214 OFFICE O/P EST MOD 30 MIN: CPT | Performed by: NURSE PRACTITIONER

## 2021-10-25 ASSESSMENT — ENCOUNTER SYMPTOMS
FEVER: 0
MYALGIAS: 0
CLAUDICATION: 0
PND: 0
SHORTNESS OF BREATH: 1
COUGH: 0
DIZZINESS: 0
ABDOMINAL PAIN: 0
ORTHOPNEA: 0
PALPITATIONS: 0

## 2021-10-25 NOTE — PROGRESS NOTES
Chief Complaint   Patient presents with   • Congestive Heart Failure     F/V Dx: Congestive heart failure, NYHA class 2 and ACC/AHA stage C (HCC)   • Coronary Artery Disease     F/V Dx: Coronary artery disease involving native coronary artery of native heart without angina pectoris       Subjective:   Ivelisse Montague is a 61 y.o. female who presents today for follow-up on her heart failure.    Patient of the heart failure clinic.  She was last seen in on 4/12/2021.  During that visit, she was sent for follow-up lab testing.      She reports recently she has been having a couple episodes of sharp pain on her left chest underneath her breast that shoots to her back.  She states that last about a minute.  She has had 3-4 episodes.  She does note some tenderness with palpation.  After a while, she remembered hitting her left rib area on the bathtub when she was cleaning.    She also reports feeling a little tired, but denies being sleepy.    She otherwise denies palpitations, orthopnea, PND, edema or dizziness/lightheadedness.  He does continue to have some mild shortness of breath.    Her home weights are around 259 pounds.    She has taken extra dose of Lasix as needed.    She reports getting lab testing done last month at [facility].    Additonally, patient has the following medical problems:    -Hospitalization from 9/27/2019 through 10/2/2019.  Patient came reporting lower extremity edema and shortness of breath.  She was found to have an LVEF of 20%, was sent for coronary angiogram which showed only mild CAD.  Patient was also treated for hypertension.  Patient was diuresed, and sent home on medical therapy.    -Sleep apnea: Using CPAP regularly    -Hyperlipidemia    -GERD    -Hypothyroidism    -Chronic back pain    -Former smoker: Quit 2008, smoked for 45 years    Past Medical History:   Diagnosis Date   • Back pain    • CAD (coronary artery disease)    • CHF (congestive heart failure) (HCC)    • GERD  (gastroesophageal reflux disease)    • Hyperlipidemia    • Hypertension    • RLS (restless legs syndrome)    • Thyroid disease      Past Surgical History:   Procedure Laterality Date   • KNEE REPLACEMENT, TOTAL Bilateral    • LUMBAR LAMINECTOMY DISKECTOMY     • WRIST FUSION Left     x2   • ZZZ CARDIAC CATH       Family History   Problem Relation Age of Onset   • Diabetes Mother    • Lung Disease Mother    • Cancer Mother         CLL   • Osteoporosis Mother    • Asthma Mother    • Heart Attack Father    • Thyroid Sister    • Heart Disease Brother    • Diabetes Brother    • Heart Attack Brother    • Lung Disease Brother    • Bladder cancer Brother    • Heart Disease Brother    • Heart Attack Brother      Social History     Socioeconomic History   • Marital status: Single     Spouse name: Not on file   • Number of children: Not on file   • Years of education: Not on file   • Highest education level: Not on file   Occupational History   • Not on file   Tobacco Use   • Smoking status: Former Smoker     Packs/day: 1.00     Years: 40.00     Pack years: 40.00     Types: Cigarettes     Quit date: 2008     Years since quittin.5   • Smokeless tobacco: Never Used   • Tobacco comment: 1-1.5 PPD   Vaping Use   • Vaping Use: Never used   Substance and Sexual Activity   • Alcohol use: No   • Drug use: No   • Sexual activity: Not on file   Other Topics Concern   • Not on file   Social History Narrative   • Not on file     Social Determinants of Health     Financial Resource Strain:    • Difficulty of Paying Living Expenses:    Food Insecurity:    • Worried About Running Out of Food in the Last Year:    • Ran Out of Food in the Last Year:    Transportation Needs:    • Lack of Transportation (Medical):    • Lack of Transportation (Non-Medical):    Physical Activity:    • Days of Exercise per Week:    • Minutes of Exercise per Session:    Stress:    • Feeling of Stress :    Social Connections:    • Frequency of Communication  with Friends and Family:    • Frequency of Social Gatherings with Friends and Family:    • Attends Rastafarian Services:    • Active Member of Clubs or Organizations:    • Attends Club or Organization Meetings:    • Marital Status:    Intimate Partner Violence:    • Fear of Current or Ex-Partner:    • Emotionally Abused:    • Physically Abused:    • Sexually Abused:      Allergies   Allergen Reactions   • Codeine Vomiting   • Lipitor [Atorvastatin Calcium]      Outpatient Encounter Medications as of 10/25/2021   Medication Sig Dispense Refill   • Multiple Vitamins-Minerals (ZINC PO) Take 100 mg by mouth every day.     • losartan (COZAAR) 25 MG Tab TAKE 1 TABLET BY MOUTH EVERY DAY 90 Tablet 2   • metoprolol SR (TOPROL XL) 50 MG TABLET SR 24 HR TAKE 1 TABLET BY MOUTH EVERY DAY 90 tablet 3   • ARNUITY ELLIPTA 100 MCG/ACT AEROSOL POWDER, BREATH ACTIVATED Inhale every day.     • omeprazole (PRILOSEC) 40 MG delayed-release capsule      • albuterol 108 (90 Base) MCG/ACT Aero Soln inhalation aerosol Inhale 2 Puffs every 6 hours as needed.     • simvastatin (ZOCOR) 20 MG Tab Take 1 tablet by mouth every day. N THE EVENING 90 tablet 3   • Ascorbic Acid (VITAMIN C) 1000 MG Tab Take 2,000 mg by mouth every day.     • vitamin D (CHOLECALCIFEROL) 1000 UNIT Tab Take 1,000 Units by mouth every day.     • MAGNESIUM-OXIDE 400 (241.3 Mg) MG Tab tablet Take 400 mg by mouth 3 times a day.  0   • aspirin 81 MG EC tablet Take 1 Tab by mouth every day. 30 Tab 0   • levothyroxine (SYNTHROID) 200 MCG Tab Take 200 mcg by mouth Every morning on an empty stomach.       No facility-administered encounter medications on file as of 10/25/2021.     Review of Systems   Constitutional: Positive for malaise/fatigue. Negative for fever.   Respiratory: Positive for shortness of breath. Negative for cough.    Cardiovascular: Positive for chest pain (Sharp). Negative for palpitations, orthopnea, claudication, leg swelling and PND.   Gastrointestinal:  "Negative for abdominal pain.   Musculoskeletal: Negative for myalgias.   Neurological: Negative for dizziness.   All other systems reviewed and are negative.       Objective:   /100 (BP Location: Left arm, Patient Position: Sitting, BP Cuff Size: Adult)   Pulse 65   Resp 14   Ht 1.575 m (5' 2\")   Wt 118 kg (259 lb 9.6 oz)   SpO2 92%   BMI 47.48 kg/m²     Physical Exam  Vitals reviewed.   Constitutional:       Appearance: She is well-developed. She is obese.   HENT:      Head: Normocephalic and atraumatic.   Eyes:      Pupils: Pupils are equal, round, and reactive to light.   Neck:      Vascular: No JVD.   Cardiovascular:      Rate and Rhythm: Normal rate and regular rhythm.      Heart sounds: Normal heart sounds.   Pulmonary:      Effort: Pulmonary effort is normal. No respiratory distress.      Breath sounds: Normal breath sounds. No wheezing or rales.   Chest:      Chest wall: Tenderness (Left chest wall beneath breast area) present.   Abdominal:      General: Bowel sounds are normal.      Palpations: Abdomen is soft.   Musculoskeletal:      Cervical back: Normal range of motion and neck supple.      Right lower leg: No edema.      Left lower leg: No edema.   Skin:     General: Skin is warm and dry.   Neurological:      Mental Status: She is alert and oriented to person, place, and time.   Psychiatric:         Behavior: Behavior normal.       No results found for: CHOLSTRLTOT, LDL, HDL, TRIGLYCERIDE    Lab Results   Component Value Date/Time    SODIUM 136 07/06/2020 01:18 PM    SODIUM 137 10/02/2019 02:29 AM    POTASSIUM 5.2 07/06/2020 01:18 PM    POTASSIUM 4.3 10/02/2019 02:29 AM    CHLORIDE 97 07/06/2020 01:18 PM    CHLORIDE 100 10/02/2019 02:29 AM    CO2 21 07/06/2020 01:18 PM    CO2 26 10/02/2019 02:29 AM    GLUCOSE 89 07/06/2020 01:18 PM    GLUCOSE 96 10/02/2019 02:29 AM    BUN 30 (H) 07/06/2020 01:18 PM    BUN 40 (H) 10/02/2019 02:29 AM    CREATININE 2.02 (H) 07/06/2020 01:18 PM    CREATININE " 1.03 10/02/2019 02:29 AM    BUNCREATRAT 15 07/06/2020 01:18 PM     Lab Results   Component Value Date/Time    ALKPHOSPHAT 60 10/01/2019 12:36 AM    ASTSGOT 24 10/01/2019 12:36 AM    ALTSGPT 23 10/01/2019 12:36 AM    TBILIRUBIN 0.9 10/01/2019 12:36 AM      Transthoracic Echo Report 9/27/2019  No prior study is available for comparison.   Severely reduced left ventricular systolic function. Left ventricular   ejection fraction is visually estimated to be 20%.   Grade I diastolic dysfunction.  Mildly dilated right ventricle. Reduced right ventricular systolic   function.  Normal inferior vena cava size and inspiratory collapse.    Heart cath 10/1/2019  Conclusions    1. There is minimal  coronary artery disease with one vessel disease.    2. Normal LVEDP.    Recommendations    * Continue medical management and risk factor modification.     Transthoracic Echo Report 1/15/2020  Normal left ventricular systolic function. Left ventricular ejection fraction is visually estimated to be 55%.  Indeterminate diastolic function.  Mildly dilated right ventricle. Reduced right ventricular systolic   function.  Normal inferior vena cava size and inspiratory collapse.  Estimated right ventricular systolic pressure  is 36 mmHg.    Assessment:     1. Other chest pain  EKG    Comp Metabolic Panel    Lipid Profile    CBC WITH DIFFERENTIAL   2. HTN (hypertension), malignant  Comp Metabolic Panel    Lipid Profile    CBC WITH DIFFERENTIAL   3. High risk medication use  Comp Metabolic Panel    Lipid Profile    CBC WITH DIFFERENTIAL   4. Mixed hyperlipidemia  Comp Metabolic Panel    Lipid Profile    CBC WITH DIFFERENTIAL   5. Coronary artery disease involving native coronary artery of native heart without angina pectoris  Comp Metabolic Panel    Lipid Profile    CBC WITH DIFFERENTIAL   6. ACC/AHA stage C systolic heart failure (HCC)     7. Heart failure, NYHA class 2 (HCC)     8. Former tobacco use     9. Non-ischemic cardiomyopathy (HCC)      10. Obesity, Class III, BMI 40-49.9 (morbid obesity) (HCC)     11. EDWARD (obstructive sleep apnea)     12. Acquired hypothyroidism         Medical Decision Making:  Today's Assessment / Status / Plan:   1.  Chest wall pain:  -EKG today shows sinus bradycardia 50  -Her pain likely due to hitting her rib area.  -Reviewed prior angiogram results  -Encourage patient to take some Tylenol and use heat and ice to the area  -If not better by next visit, can consider further imaging    2. HFrEF, Stage C, Class 2, LVEF 55% improved from 20%: Based on physical examination findings, patient is euvolemic. No JVD, lungs are clear to auscultation, no pitting edema in bilateral lower extremities, no ascites.  -Will request labs from [facility]  -Encourage hydration  -Continue losartan 25 mg daily  -Continue Toprol-XL 50 mg daily at night  -Previously taken off of spironolactone due to kidney insufficiency, kidney function has improved per labs in May  -No indication for ICD with improvement of her EF to 55%  -Reinforced s/sx of worsening heart failure with patient and weight monitoring. Pt verbalizes understanding. Pt to call office or RTC if present.     3.  Hypertension: Borderline  -Patient reports drinking extra caffeine today  -Continue recommendations per above for now    4.  CAD/hyperlipidemia: Minimal CAD per angiogram  -Patient has an allergy to atorvastatin  -Continue simvastatin 20 mg daily (patient reports she has been tolerating simvastatin)  -Continue aspirin 81 mg daily  -Obtain a CMP and lipid panel before next visit  -Continue smoking cessation    5.  Obesity: BMI 47.48  -Encouraged weight loss    6.  Sleep apnea:  -Continue regular CPAP use  -Patient reports she continues to follow-up with sleep medicine.    7.  Hypothyroidism:  -Followed by PCP, recent dosing change  -Abnormal thyroid levels could be causing her tiredness    CBC, CMP and lipid panel in 3 months    FU in clinic in 3 months with labs.  Sooner if  needed.    Patient verbalizes understanding and agrees with the plan of care.     PLEASE NOTE: This Note was created using voice recognition Software. I have made every reasonable attempt to correct obvious errors, but I expect that there are errors of grammar and possibly content that I did not discover before finalizing the note

## 2021-10-26 DIAGNOSIS — R07.89 OTHER CHEST PAIN: ICD-10-CM

## 2021-10-26 DIAGNOSIS — Z79.899 HIGH RISK MEDICATION USE: ICD-10-CM

## 2021-10-26 DIAGNOSIS — I25.10 CORONARY ARTERY DISEASE INVOLVING NATIVE CORONARY ARTERY OF NATIVE HEART WITHOUT ANGINA PECTORIS: ICD-10-CM

## 2021-10-26 DIAGNOSIS — I10 HTN (HYPERTENSION), MALIGNANT: ICD-10-CM

## 2021-10-26 DIAGNOSIS — E78.2 MIXED HYPERLIPIDEMIA: ICD-10-CM

## 2021-10-26 LAB — EKG IMPRESSION: NORMAL

## 2021-11-02 NOTE — RESULT ENCOUNTER NOTE
Please let patient know that her lipid panel is abnormal.  Her LDL is high at 121.  I know she has an allergy to atorvastatin, but I would like to switch her over to rosuvastatin and see if the new medication can lower her cholesterol.    I would like to start her on rosuvastatin 10 mg daily and stop simvastatin.

## 2021-11-05 ENCOUNTER — TELEPHONE (OUTPATIENT)
Dept: CARDIOLOGY | Facility: MEDICAL CENTER | Age: 61
End: 2021-11-05

## 2021-11-05 RX ORDER — ROSUVASTATIN CALCIUM 10 MG/1
20 TABLET, COATED ORAL EVERY EVENING
Qty: 90 TABLET | Refills: 3 | Status: SHIPPED | OUTPATIENT
Start: 2021-11-05 | End: 2021-11-16

## 2021-11-05 NOTE — TELEPHONE ENCOUNTER
BIA Jose.  Stiven Gerard R.N.  Please let patient know that her lipid panel is abnormal.  Her LDL is high at 121.  I know she has an allergy to atorvastatin, but I would like to switch her over to rosuvastatin and see if the new medication can lower her cholesterol.     I would like to start her on rosuvastatin 10 mg daily and stop simvastatin.     S/W pt, she is aware of change

## 2021-11-16 RX ORDER — ROSUVASTATIN CALCIUM 10 MG/1
10 TABLET, COATED ORAL EVERY EVENING
Qty: 90 TABLET | Refills: 3 | Status: SHIPPED | OUTPATIENT
Start: 2021-11-16 | End: 2022-11-22

## 2022-02-09 ENCOUNTER — PRE-ADMISSION TESTING (OUTPATIENT)
Dept: ADMISSIONS | Facility: MEDICAL CENTER | Age: 62
End: 2022-02-09
Attending: STUDENT IN AN ORGANIZED HEALTH CARE EDUCATION/TRAINING PROGRAM
Payer: MEDICAID

## 2022-02-09 DIAGNOSIS — Z01.812 PRE-OPERATIVE LABORATORY EXAMINATION: ICD-10-CM

## 2022-02-09 LAB
ANION GAP SERPL CALC-SCNC: 10 MMOL/L (ref 7–16)
BUN SERPL-MCNC: 14 MG/DL (ref 8–22)
CALCIUM SERPL-MCNC: 9.1 MG/DL (ref 8.4–10.2)
CHLORIDE SERPL-SCNC: 106 MMOL/L (ref 96–112)
CO2 SERPL-SCNC: 25 MMOL/L (ref 20–33)
CREAT SERPL-MCNC: 0.98 MG/DL (ref 0.5–1.4)
ERYTHROCYTE [DISTWIDTH] IN BLOOD BY AUTOMATED COUNT: 46.4 FL (ref 35.9–50)
GLUCOSE SERPL-MCNC: 83 MG/DL (ref 65–99)
HCT VFR BLD AUTO: 40.4 % (ref 37–47)
HGB BLD-MCNC: 12.6 G/DL (ref 12–16)
MCH RBC QN AUTO: 28.7 PG (ref 27–33)
MCHC RBC AUTO-ENTMCNC: 31.2 G/DL (ref 33.6–35)
MCV RBC AUTO: 92 FL (ref 81.4–97.8)
PLATELET # BLD AUTO: 310 K/UL (ref 164–446)
PMV BLD AUTO: 9.3 FL (ref 9–12.9)
POTASSIUM SERPL-SCNC: 4.2 MMOL/L (ref 3.6–5.5)
RBC # BLD AUTO: 4.39 M/UL (ref 4.2–5.4)
SODIUM SERPL-SCNC: 141 MMOL/L (ref 135–145)
WBC # BLD AUTO: 7.8 K/UL (ref 4.8–10.8)

## 2022-02-09 PROCEDURE — 85027 COMPLETE CBC AUTOMATED: CPT

## 2022-02-09 PROCEDURE — 87641 MR-STAPH DNA AMP PROBE: CPT

## 2022-02-09 PROCEDURE — 36415 COLL VENOUS BLD VENIPUNCTURE: CPT

## 2022-02-09 PROCEDURE — 87640 STAPH A DNA AMP PROBE: CPT

## 2022-02-09 PROCEDURE — 80048 BASIC METABOLIC PNL TOTAL CA: CPT

## 2022-02-09 RX ORDER — CEFAZOLIN SODIUM IN 0.9 % NACL 2 G/100 ML
2 PLASTIC BAG, INJECTION (ML) INTRAVENOUS ONCE
Status: CANCELLED | OUTPATIENT
Start: 2022-02-23 | End: 2022-02-23

## 2022-02-09 RX ORDER — LEVOTHYROXINE SODIUM 0.15 MG/1
150 TABLET ORAL
COMMUNITY

## 2022-02-09 NOTE — DISCHARGE PLANNING
DISCHARGE PLANNING NOTE - TOTAL JOINT    Procedure: Procedure(s):  ARTHROPLASTY, SHOULDER, TOTAL  Procedure Date: 2/23/2022  Insurance: Payor: MEDICAID HMO / Plan: [place] MEDICAID / Product Type: *No Product type* /    Equipment currently available at home?  shower chair and ice, oversized shirt.  Steps into the home? 0  Steps within the home? 0  Toilet height? Standard  Type of shower? tub-shower  Who will be with you during your recovery? Spouse.  Is Outpatient Physical Therapy set up after surgery? Yes  Did you take the Total Joint Class and where? Yes  Planning same day discharge?Yes     This writer met with pt during her preadmission appointment. Pt states she has all needed equipment. Home safety checklist reviewed and copy given to pt. Pt educated to dc criteria. All questions answered and verbalizes understanding of all instructions. No dc needs identified at this time. Anticipate dc to home without barriers.

## 2022-02-09 NOTE — OR NURSING
Good morning,    OK to proceed pending those two specific clearances as noted. Thank you.    Fransico Torres M.D.  [facility]      On Feb 9, 2022, at 09:48, Bibi Villagran <[email or web address] wrote:  ?   Natalia Torres, pt is having total shoulder with Dr. Larkin 2/23.  BMI is 47.18, sleep apnea, CHF, asthma.  She recently had an ECHO with Dr. Baljit Thayer, as pt states a clearance as well, also states her PCP is giving a clearance as well.  Thank you              Anesthesia Summary Report           Patient Name: Ivelisse Montague MRN: [id number] Admission Date: Patient not admitted

## 2022-02-10 LAB
SCCMEC + MECA PNL NOSE NAA+PROBE: NEGATIVE
SCCMEC + MECA PNL NOSE NAA+PROBE: POSITIVE

## 2022-02-17 NOTE — OR NURSING
TATE ibrahim/Karen at Dr Larkin office regarding any updates on Cardiac Clearance. Requested she call us back to let us know if she has received this or is still waiting on it.

## 2022-02-18 ENCOUNTER — PRE-ADMISSION TESTING (OUTPATIENT)
Dept: ADMISSIONS | Facility: MEDICAL CENTER | Age: 62
End: 2022-02-18
Attending: STUDENT IN AN ORGANIZED HEALTH CARE EDUCATION/TRAINING PROGRAM
Payer: MEDICAID

## 2022-02-18 DIAGNOSIS — Z01.812 PRE-OPERATIVE LABORATORY EXAMINATION: ICD-10-CM

## 2022-02-18 LAB — COVID ORDER STATUS COVID19: NORMAL

## 2022-02-18 PROCEDURE — U0005 INFEC AGEN DETEC AMPLI PROBE: HCPCS

## 2022-02-18 PROCEDURE — U0003 INFECTIOUS AGENT DETECTION BY NUCLEIC ACID (DNA OR RNA); SEVERE ACUTE RESPIRATORY SYNDROME CORONAVIRUS 2 (SARS-COV-2) (CORONAVIRUS DISEASE [COVID-19]), AMPLIFIED PROBE TECHNIQUE, MAKING USE OF HIGH THROUGHPUT TECHNOLOGIES AS DESCRIBED BY CMS-2020-01-R: HCPCS

## 2022-02-19 LAB
SARS-COV-2 RNA RESP QL NAA+PROBE: NOTDETECTED
SPECIMEN SOURCE: NORMAL

## 2022-02-23 ENCOUNTER — ANESTHESIA EVENT (OUTPATIENT)
Dept: SURGERY | Facility: MEDICAL CENTER | Age: 62
End: 2022-02-23
Payer: MEDICAID

## 2022-02-23 ENCOUNTER — APPOINTMENT (OUTPATIENT)
Dept: RADIOLOGY | Facility: MEDICAL CENTER | Age: 62
End: 2022-02-23
Attending: STUDENT IN AN ORGANIZED HEALTH CARE EDUCATION/TRAINING PROGRAM
Payer: MEDICAID

## 2022-02-23 ENCOUNTER — HOSPITAL ENCOUNTER (OUTPATIENT)
Facility: MEDICAL CENTER | Age: 62
End: 2022-02-24
Attending: STUDENT IN AN ORGANIZED HEALTH CARE EDUCATION/TRAINING PROGRAM | Admitting: STUDENT IN AN ORGANIZED HEALTH CARE EDUCATION/TRAINING PROGRAM
Payer: MEDICAID

## 2022-02-23 ENCOUNTER — ANESTHESIA (OUTPATIENT)
Dept: SURGERY | Facility: MEDICAL CENTER | Age: 62
End: 2022-02-23
Payer: MEDICAID

## 2022-02-23 ENCOUNTER — PATIENT OUTREACH (OUTPATIENT)
Dept: HEALTH INFORMATION MANAGEMENT | Facility: OTHER | Age: 62
End: 2022-02-23
Payer: MEDICAID

## 2022-02-23 PROCEDURE — 94760 N-INVAS EAR/PLS OXIMETRY 1: CPT

## 2022-02-23 PROCEDURE — 64415 NJX AA&/STRD BRCH PLXS IMG: CPT | Performed by: STUDENT IN AN ORGANIZED HEALTH CARE EDUCATION/TRAINING PROGRAM

## 2022-02-23 PROCEDURE — 96365 THER/PROPH/DIAG IV INF INIT: CPT | Mod: XU

## 2022-02-23 PROCEDURE — 500562 HCHG FIBERWIRE: Performed by: STUDENT IN AN ORGANIZED HEALTH CARE EDUCATION/TRAINING PROGRAM

## 2022-02-23 PROCEDURE — 160002 HCHG RECOVERY MINUTES (STAT): Performed by: STUDENT IN AN ORGANIZED HEALTH CARE EDUCATION/TRAINING PROGRAM

## 2022-02-23 PROCEDURE — C1776 JOINT DEVICE (IMPLANTABLE): HCPCS | Performed by: STUDENT IN AN ORGANIZED HEALTH CARE EDUCATION/TRAINING PROGRAM

## 2022-02-23 PROCEDURE — 700102 HCHG RX REV CODE 250 W/ 637 OVERRIDE(OP): Performed by: STUDENT IN AN ORGANIZED HEALTH CARE EDUCATION/TRAINING PROGRAM

## 2022-02-23 PROCEDURE — A9270 NON-COVERED ITEM OR SERVICE: HCPCS | Performed by: STUDENT IN AN ORGANIZED HEALTH CARE EDUCATION/TRAINING PROGRAM

## 2022-02-23 PROCEDURE — 160035 HCHG PACU - 1ST 60 MINS PHASE I: Performed by: STUDENT IN AN ORGANIZED HEALTH CARE EDUCATION/TRAINING PROGRAM

## 2022-02-23 PROCEDURE — 500367 HCHG DRAIN KIT, HEMOVAC: Performed by: STUDENT IN AN ORGANIZED HEALTH CARE EDUCATION/TRAINING PROGRAM

## 2022-02-23 PROCEDURE — C1713 ANCHOR/SCREW BN/BN,TIS/BN: HCPCS | Performed by: STUDENT IN AN ORGANIZED HEALTH CARE EDUCATION/TRAINING PROGRAM

## 2022-02-23 PROCEDURE — 160029 HCHG SURGERY MINUTES - 1ST 30 MINS LEVEL 4: Performed by: STUDENT IN AN ORGANIZED HEALTH CARE EDUCATION/TRAINING PROGRAM

## 2022-02-23 PROCEDURE — A4565 SLINGS: HCPCS | Performed by: STUDENT IN AN ORGANIZED HEALTH CARE EDUCATION/TRAINING PROGRAM

## 2022-02-23 PROCEDURE — 700111 HCHG RX REV CODE 636 W/ 250 OVERRIDE (IP): Performed by: STUDENT IN AN ORGANIZED HEALTH CARE EDUCATION/TRAINING PROGRAM

## 2022-02-23 PROCEDURE — 700105 HCHG RX REV CODE 258: Performed by: STUDENT IN AN ORGANIZED HEALTH CARE EDUCATION/TRAINING PROGRAM

## 2022-02-23 PROCEDURE — 73020 X-RAY EXAM OF SHOULDER: CPT | Mod: RT

## 2022-02-23 PROCEDURE — 502240 HCHG MISC OR SUPPLY RC 0272: Performed by: STUDENT IN AN ORGANIZED HEALTH CARE EDUCATION/TRAINING PROGRAM

## 2022-02-23 PROCEDURE — 96375 TX/PRO/DX INJ NEW DRUG ADDON: CPT | Mod: XU

## 2022-02-23 PROCEDURE — 160036 HCHG PACU - EA ADDL 30 MINS PHASE I: Performed by: STUDENT IN AN ORGANIZED HEALTH CARE EDUCATION/TRAINING PROGRAM

## 2022-02-23 PROCEDURE — 160009 HCHG ANES TIME/MIN: Performed by: STUDENT IN AN ORGANIZED HEALTH CARE EDUCATION/TRAINING PROGRAM

## 2022-02-23 PROCEDURE — 94669 MECHANICAL CHEST WALL OSCILL: CPT

## 2022-02-23 PROCEDURE — 160041 HCHG SURGERY MINUTES - EA ADDL 1 MIN LEVEL 4: Performed by: STUDENT IN AN ORGANIZED HEALTH CARE EDUCATION/TRAINING PROGRAM

## 2022-02-23 PROCEDURE — 700101 HCHG RX REV CODE 250: Performed by: ANESTHESIOLOGY

## 2022-02-23 PROCEDURE — 700101 HCHG RX REV CODE 250: Performed by: STUDENT IN AN ORGANIZED HEALTH CARE EDUCATION/TRAINING PROGRAM

## 2022-02-23 PROCEDURE — 700111 HCHG RX REV CODE 636 W/ 250 OVERRIDE (IP): Performed by: ANESTHESIOLOGY

## 2022-02-23 PROCEDURE — G0378 HOSPITAL OBSERVATION PER HR: HCPCS

## 2022-02-23 PROCEDURE — 502000 HCHG MISC OR IMPLANTS RC 0278: Performed by: STUDENT IN AN ORGANIZED HEALTH CARE EDUCATION/TRAINING PROGRAM

## 2022-02-23 PROCEDURE — 160048 HCHG OR STATISTICAL LEVEL 1-5: Performed by: STUDENT IN AN ORGANIZED HEALTH CARE EDUCATION/TRAINING PROGRAM

## 2022-02-23 PROCEDURE — 500002 HCHG ADHESIVE, DERMABOND: Performed by: STUDENT IN AN ORGANIZED HEALTH CARE EDUCATION/TRAINING PROGRAM

## 2022-02-23 PROCEDURE — 97165 OT EVAL LOW COMPLEX 30 MIN: CPT

## 2022-02-23 DEVICE — IMPLANTABLE DEVICE: Type: IMPLANTABLE DEVICE | Site: SHOULDER | Status: FUNCTIONAL

## 2022-02-23 RX ORDER — DEXAMETHASONE SODIUM PHOSPHATE 4 MG/ML
4 INJECTION, SOLUTION INTRA-ARTICULAR; INTRALESIONAL; INTRAMUSCULAR; INTRAVENOUS; SOFT TISSUE
Status: DISCONTINUED | OUTPATIENT
Start: 2022-02-23 | End: 2022-02-24 | Stop reason: HOSPADM

## 2022-02-23 RX ORDER — HALOPERIDOL 5 MG/ML
1 INJECTION INTRAMUSCULAR
Status: DISCONTINUED | OUTPATIENT
Start: 2022-02-23 | End: 2022-02-23 | Stop reason: HOSPADM

## 2022-02-23 RX ORDER — KETOROLAC TROMETHAMINE 30 MG/ML
30 INJECTION, SOLUTION INTRAMUSCULAR; INTRAVENOUS EVERY 6 HOURS
Status: DISCONTINUED | OUTPATIENT
Start: 2022-02-23 | End: 2022-02-24 | Stop reason: HOSPADM

## 2022-02-23 RX ORDER — HYDROMORPHONE HYDROCHLORIDE 1 MG/ML
0.2 INJECTION, SOLUTION INTRAMUSCULAR; INTRAVENOUS; SUBCUTANEOUS
Status: DISCONTINUED | OUTPATIENT
Start: 2022-02-23 | End: 2022-02-23 | Stop reason: HOSPADM

## 2022-02-23 RX ORDER — SODIUM CHLORIDE, SODIUM LACTATE, POTASSIUM CHLORIDE, CALCIUM CHLORIDE 600; 310; 30; 20 MG/100ML; MG/100ML; MG/100ML; MG/100ML
INJECTION, SOLUTION INTRAVENOUS CONTINUOUS
Status: ACTIVE | OUTPATIENT
Start: 2022-02-23 | End: 2022-02-23

## 2022-02-23 RX ORDER — HYDROMORPHONE HYDROCHLORIDE 1 MG/ML
0.4 INJECTION, SOLUTION INTRAMUSCULAR; INTRAVENOUS; SUBCUTANEOUS
Status: DISCONTINUED | OUTPATIENT
Start: 2022-02-23 | End: 2022-02-23 | Stop reason: HOSPADM

## 2022-02-23 RX ORDER — BISACODYL 10 MG
10 SUPPOSITORY, RECTAL RECTAL
Status: DISCONTINUED | OUTPATIENT
Start: 2022-02-23 | End: 2022-02-24 | Stop reason: HOSPADM

## 2022-02-23 RX ORDER — SODIUM CHLORIDE, SODIUM LACTATE, POTASSIUM CHLORIDE, CALCIUM CHLORIDE 600; 310; 30; 20 MG/100ML; MG/100ML; MG/100ML; MG/100ML
INJECTION, SOLUTION INTRAVENOUS CONTINUOUS
Status: DISCONTINUED | OUTPATIENT
Start: 2022-02-23 | End: 2022-02-23 | Stop reason: HOSPADM

## 2022-02-23 RX ORDER — MORPHINE SULFATE 4 MG/ML
4 INJECTION INTRAVENOUS
Status: DISCONTINUED | OUTPATIENT
Start: 2022-02-23 | End: 2022-02-24 | Stop reason: HOSPADM

## 2022-02-23 RX ORDER — PHENYLEPHRINE HCL IN 0.9% NACL 0.5 MG/5ML
SYRINGE (ML) INTRAVENOUS PRN
Status: DISCONTINUED | OUTPATIENT
Start: 2022-02-23 | End: 2022-02-23 | Stop reason: SURG

## 2022-02-23 RX ORDER — ACETAMINOPHEN 500 MG
1000 TABLET ORAL EVERY 6 HOURS
Status: DISCONTINUED | OUTPATIENT
Start: 2022-02-23 | End: 2022-02-24 | Stop reason: HOSPADM

## 2022-02-23 RX ORDER — MEPERIDINE HYDROCHLORIDE 25 MG/ML
12.5 INJECTION INTRAMUSCULAR; INTRAVENOUS; SUBCUTANEOUS
Status: DISCONTINUED | OUTPATIENT
Start: 2022-02-23 | End: 2022-02-23 | Stop reason: HOSPADM

## 2022-02-23 RX ORDER — DOCUSATE SODIUM 100 MG/1
100 CAPSULE, LIQUID FILLED ORAL 2 TIMES DAILY
Status: DISCONTINUED | OUTPATIENT
Start: 2022-02-23 | End: 2022-02-24 | Stop reason: HOSPADM

## 2022-02-23 RX ORDER — ENEMA 19; 7 G/133ML; G/133ML
1 ENEMA RECTAL
Status: DISCONTINUED | OUTPATIENT
Start: 2022-02-23 | End: 2022-02-24 | Stop reason: HOSPADM

## 2022-02-23 RX ORDER — LABETALOL HYDROCHLORIDE 5 MG/ML
5 INJECTION, SOLUTION INTRAVENOUS
Status: DISCONTINUED | OUTPATIENT
Start: 2022-02-23 | End: 2022-02-23 | Stop reason: HOSPADM

## 2022-02-23 RX ORDER — LIDOCAINE HYDROCHLORIDE 10 MG/ML
INJECTION, SOLUTION EPIDURAL; INFILTRATION; INTRACAUDAL; PERINEURAL
Status: DISCONTINUED
Start: 2022-02-23 | End: 2022-02-23

## 2022-02-23 RX ORDER — OXYCODONE HCL 5 MG/5 ML
5 SOLUTION, ORAL ORAL
Status: DISCONTINUED | OUTPATIENT
Start: 2022-02-23 | End: 2022-02-23 | Stop reason: HOSPADM

## 2022-02-23 RX ORDER — CEFAZOLIN SODIUM 1 G/3ML
INJECTION, POWDER, FOR SOLUTION INTRAMUSCULAR; INTRAVENOUS PRN
Status: DISCONTINUED | OUTPATIENT
Start: 2022-02-23 | End: 2022-02-23 | Stop reason: SURG

## 2022-02-23 RX ORDER — OXYCODONE HYDROCHLORIDE 10 MG/1
10 TABLET ORAL
Status: DISCONTINUED | OUTPATIENT
Start: 2022-02-23 | End: 2022-02-24 | Stop reason: HOSPADM

## 2022-02-23 RX ORDER — IBUPROFEN 400 MG/1
800 TABLET ORAL 3 TIMES DAILY PRN
Status: DISCONTINUED | OUTPATIENT
Start: 2022-02-26 | End: 2022-02-24 | Stop reason: HOSPADM

## 2022-02-23 RX ORDER — SCOLOPAMINE TRANSDERMAL SYSTEM 1 MG/1
1 PATCH, EXTENDED RELEASE TRANSDERMAL
Status: DISCONTINUED | OUTPATIENT
Start: 2022-02-23 | End: 2022-02-24 | Stop reason: HOSPADM

## 2022-02-23 RX ORDER — ONDANSETRON 2 MG/ML
INJECTION INTRAMUSCULAR; INTRAVENOUS PRN
Status: DISCONTINUED | OUTPATIENT
Start: 2022-02-23 | End: 2022-02-23 | Stop reason: SURG

## 2022-02-23 RX ORDER — AMOXICILLIN 250 MG
1 CAPSULE ORAL NIGHTLY
Status: DISCONTINUED | OUTPATIENT
Start: 2022-02-23 | End: 2022-02-24 | Stop reason: HOSPADM

## 2022-02-23 RX ORDER — CEFAZOLIN SODIUM IN 0.9 % NACL 2 G/100 ML
2 PLASTIC BAG, INJECTION (ML) INTRAVENOUS EVERY 8 HOURS
Status: COMPLETED | OUTPATIENT
Start: 2022-02-23 | End: 2022-02-24

## 2022-02-23 RX ORDER — ACETAMINOPHEN 500 MG
1000 TABLET ORAL EVERY 6 HOURS PRN
Status: DISCONTINUED | OUTPATIENT
Start: 2022-02-28 | End: 2022-02-24 | Stop reason: HOSPADM

## 2022-02-23 RX ORDER — BUPIVACAINE HYDROCHLORIDE 2.5 MG/ML
INJECTION, SOLUTION EPIDURAL; INFILTRATION; INTRACAUDAL PRN
Status: DISCONTINUED | OUTPATIENT
Start: 2022-02-23 | End: 2022-02-23 | Stop reason: SURG

## 2022-02-23 RX ORDER — VANCOMYCIN HYDROCHLORIDE 1 G/20ML
INJECTION, POWDER, LYOPHILIZED, FOR SOLUTION INTRAVENOUS
Status: COMPLETED | OUTPATIENT
Start: 2022-02-23 | End: 2022-02-23

## 2022-02-23 RX ORDER — TRANEXAMIC ACID 100 MG/ML
INJECTION, SOLUTION INTRAVENOUS PRN
Status: DISCONTINUED | OUTPATIENT
Start: 2022-02-23 | End: 2022-02-23 | Stop reason: SURG

## 2022-02-23 RX ORDER — IPRATROPIUM BROMIDE AND ALBUTEROL SULFATE 2.5; .5 MG/3ML; MG/3ML
3 SOLUTION RESPIRATORY (INHALATION)
Status: DISCONTINUED | OUTPATIENT
Start: 2022-02-23 | End: 2022-02-23 | Stop reason: HOSPADM

## 2022-02-23 RX ORDER — MIDAZOLAM HYDROCHLORIDE 1 MG/ML
INJECTION INTRAMUSCULAR; INTRAVENOUS PRN
Status: DISCONTINUED | OUTPATIENT
Start: 2022-02-23 | End: 2022-02-23 | Stop reason: SURG

## 2022-02-23 RX ORDER — POLYETHYLENE GLYCOL 3350 17 G/17G
1 POWDER, FOR SOLUTION ORAL 2 TIMES DAILY PRN
Status: DISCONTINUED | OUTPATIENT
Start: 2022-02-23 | End: 2022-02-24 | Stop reason: HOSPADM

## 2022-02-23 RX ORDER — ROCURONIUM BROMIDE 10 MG/ML
INJECTION, SOLUTION INTRAVENOUS PRN
Status: DISCONTINUED | OUTPATIENT
Start: 2022-02-23 | End: 2022-02-23 | Stop reason: SURG

## 2022-02-23 RX ORDER — ONDANSETRON 2 MG/ML
4 INJECTION INTRAMUSCULAR; INTRAVENOUS
Status: DISCONTINUED | OUTPATIENT
Start: 2022-02-23 | End: 2022-02-23 | Stop reason: HOSPADM

## 2022-02-23 RX ORDER — OXYCODONE HCL 5 MG/5 ML
10 SOLUTION, ORAL ORAL
Status: DISCONTINUED | OUTPATIENT
Start: 2022-02-23 | End: 2022-02-23 | Stop reason: HOSPADM

## 2022-02-23 RX ORDER — DEXAMETHASONE SODIUM PHOSPHATE 4 MG/ML
INJECTION, SOLUTION INTRA-ARTICULAR; INTRALESIONAL; INTRAMUSCULAR; INTRAVENOUS; SOFT TISSUE PRN
Status: DISCONTINUED | OUTPATIENT
Start: 2022-02-23 | End: 2022-02-23 | Stop reason: SURG

## 2022-02-23 RX ORDER — HALOPERIDOL 5 MG/ML
1 INJECTION INTRAMUSCULAR EVERY 6 HOURS PRN
Status: DISCONTINUED | OUTPATIENT
Start: 2022-02-23 | End: 2022-02-24 | Stop reason: HOSPADM

## 2022-02-23 RX ORDER — CEFAZOLIN SODIUM IN 0.9 % NACL 2 G/100 ML
2 PLASTIC BAG, INJECTION (ML) INTRAVENOUS ONCE
Status: DISCONTINUED | OUTPATIENT
Start: 2022-02-23 | End: 2022-02-23 | Stop reason: HOSPADM

## 2022-02-23 RX ORDER — ONDANSETRON 2 MG/ML
4 INJECTION INTRAMUSCULAR; INTRAVENOUS EVERY 4 HOURS PRN
Status: DISCONTINUED | OUTPATIENT
Start: 2022-02-23 | End: 2022-02-24 | Stop reason: HOSPADM

## 2022-02-23 RX ORDER — AMOXICILLIN 250 MG
1 CAPSULE ORAL
Status: DISCONTINUED | OUTPATIENT
Start: 2022-02-23 | End: 2022-02-24 | Stop reason: HOSPADM

## 2022-02-23 RX ORDER — LIDOCAINE HYDROCHLORIDE 20 MG/ML
INJECTION, SOLUTION EPIDURAL; INFILTRATION; INTRACAUDAL; PERINEURAL PRN
Status: DISCONTINUED | OUTPATIENT
Start: 2022-02-23 | End: 2022-02-23 | Stop reason: SURG

## 2022-02-23 RX ORDER — DIPHENHYDRAMINE HYDROCHLORIDE 50 MG/ML
25 INJECTION INTRAMUSCULAR; INTRAVENOUS EVERY 6 HOURS PRN
Status: DISCONTINUED | OUTPATIENT
Start: 2022-02-23 | End: 2022-02-24 | Stop reason: HOSPADM

## 2022-02-23 RX ORDER — HYDROMORPHONE HYDROCHLORIDE 1 MG/ML
0.1 INJECTION, SOLUTION INTRAMUSCULAR; INTRAVENOUS; SUBCUTANEOUS
Status: DISCONTINUED | OUTPATIENT
Start: 2022-02-23 | End: 2022-02-23 | Stop reason: HOSPADM

## 2022-02-23 RX ORDER — OXYCODONE HYDROCHLORIDE 5 MG/1
5 TABLET ORAL
Status: DISCONTINUED | OUTPATIENT
Start: 2022-02-23 | End: 2022-02-24 | Stop reason: HOSPADM

## 2022-02-23 RX ADMIN — ROCURONIUM BROMIDE 50 MG: 10 INJECTION, SOLUTION INTRAVENOUS at 08:01

## 2022-02-23 RX ADMIN — TRANEXAMIC ACID 1000 MG: 100 INJECTION, SOLUTION INTRAVENOUS at 08:01

## 2022-02-23 RX ADMIN — BUPIVACAINE 10 ML: 13.3 INJECTION, SUSPENSION, LIPOSOMAL INFILTRATION at 07:26

## 2022-02-23 RX ADMIN — LIDOCAINE HYDROCHLORIDE 60 MG: 20 INJECTION, SOLUTION EPIDURAL; INFILTRATION; INTRACAUDAL; PERINEURAL at 08:01

## 2022-02-23 RX ADMIN — PROPOFOL 200 MG: 10 INJECTION, EMULSION INTRAVENOUS at 08:01

## 2022-02-23 RX ADMIN — ONDANSETRON 4 MG: 2 INJECTION INTRAMUSCULAR; INTRAVENOUS at 09:48

## 2022-02-23 RX ADMIN — CEFAZOLIN 2 G: 1 INJECTION, POWDER, FOR SOLUTION INTRAVENOUS at 14:07

## 2022-02-23 RX ADMIN — SUGAMMADEX 200 MG: 100 INJECTION, SOLUTION INTRAVENOUS at 09:49

## 2022-02-23 RX ADMIN — MIDAZOLAM HYDROCHLORIDE 1 MG: 1 INJECTION, SOLUTION INTRAMUSCULAR; INTRAVENOUS at 07:21

## 2022-02-23 RX ADMIN — ROCURONIUM BROMIDE 10 MG: 10 INJECTION, SOLUTION INTRAVENOUS at 09:06

## 2022-02-23 RX ADMIN — ONDANSETRON 4 MG: 2 INJECTION INTRAMUSCULAR; INTRAVENOUS at 14:10

## 2022-02-23 RX ADMIN — BUPIVACAINE HYDROCHLORIDE 10 ML: 2.5 INJECTION, SOLUTION EPIDURAL; INFILTRATION; INTRACAUDAL; PERINEURAL at 07:26

## 2022-02-23 RX ADMIN — FENTANYL CITRATE 100 MCG: 50 INJECTION, SOLUTION INTRAMUSCULAR; INTRAVENOUS at 08:01

## 2022-02-23 RX ADMIN — ACETAMINOPHEN 1000 MG: 500 TABLET, FILM COATED ORAL at 17:56

## 2022-02-23 RX ADMIN — CEFAZOLIN 3 G: 330 INJECTION, POWDER, FOR SOLUTION INTRAMUSCULAR; INTRAVENOUS at 08:01

## 2022-02-23 RX ADMIN — DEXAMETHASONE SODIUM PHOSPHATE 10 MG: 4 INJECTION, SOLUTION INTRAMUSCULAR; INTRAVENOUS at 08:10

## 2022-02-23 RX ADMIN — CEFAZOLIN 2 G: 1 INJECTION, POWDER, FOR SOLUTION INTRAVENOUS at 21:26

## 2022-02-23 RX ADMIN — FENTANYL CITRATE 50 MCG: 50 INJECTION, SOLUTION INTRAMUSCULAR; INTRAVENOUS at 09:51

## 2022-02-23 RX ADMIN — MIDAZOLAM HYDROCHLORIDE 1 MG: 1 INJECTION, SOLUTION INTRAMUSCULAR; INTRAVENOUS at 07:14

## 2022-02-23 RX ADMIN — TRANEXAMIC ACID 1000 MG: 100 INJECTION, SOLUTION INTRAVENOUS at 09:51

## 2022-02-23 RX ADMIN — KETOROLAC TROMETHAMINE 30 MG: 30 INJECTION, SOLUTION INTRAMUSCULAR at 14:06

## 2022-02-23 RX ADMIN — SODIUM CHLORIDE, POTASSIUM CHLORIDE, SODIUM LACTATE AND CALCIUM CHLORIDE: 600; 310; 30; 20 INJECTION, SOLUTION INTRAVENOUS at 07:45

## 2022-02-23 RX ADMIN — Medication 100 MCG: at 08:22

## 2022-02-23 ASSESSMENT — COGNITIVE AND FUNCTIONAL STATUS - GENERAL
SUGGESTED CMS G CODE MODIFIER MOBILITY: CI
DAILY ACTIVITIY SCORE: 16
EATING MEALS: A LITTLE
EATING MEALS: A LITTLE
HELP NEEDED FOR BATHING: A LOT
DRESSING REGULAR LOWER BODY CLOTHING: A LOT
MOBILITY SCORE: 23
PERSONAL GROOMING: A LITTLE
STANDING UP FROM CHAIR USING ARMS: A LITTLE
SUGGESTED CMS G CODE MODIFIER DAILY ACTIVITY: CK
TOILETING: A LITTLE
HELP NEEDED FOR BATHING: A LOT
DRESSING REGULAR LOWER BODY CLOTHING: A LOT
PERSONAL GROOMING: A LITTLE
SUGGESTED CMS G CODE MODIFIER DAILY ACTIVITY: CK
DRESSING REGULAR UPPER BODY CLOTHING: A LOT
DAILY ACTIVITIY SCORE: 15
TOILETING: A LITTLE
DRESSING REGULAR UPPER BODY CLOTHING: A LITTLE

## 2022-02-23 ASSESSMENT — PAIN DESCRIPTION - PAIN TYPE
TYPE: ACUTE PAIN;SURGICAL PAIN
TYPE: SURGICAL PAIN
TYPE: ACUTE PAIN
TYPE: ACUTE PAIN;SURGICAL PAIN
TYPE: SURGICAL PAIN

## 2022-02-23 ASSESSMENT — PATIENT HEALTH QUESTIONNAIRE - PHQ9
SUM OF ALL RESPONSES TO PHQ9 QUESTIONS 1 AND 2: 0
2. FEELING DOWN, DEPRESSED, IRRITABLE, OR HOPELESS: NOT AT ALL
1. LITTLE INTEREST OR PLEASURE IN DOING THINGS: NOT AT ALL

## 2022-02-23 ASSESSMENT — LIFESTYLE VARIABLES
TOTAL SCORE: 0
ON A TYPICAL DAY WHEN YOU DRINK ALCOHOL HOW MANY DRINKS DO YOU HAVE: 0
HAVE PEOPLE ANNOYED YOU BY CRITICIZING YOUR DRINKING: NO
CONSUMPTION TOTAL: NEGATIVE
AVERAGE NUMBER OF DAYS PER WEEK YOU HAVE A DRINK CONTAINING ALCOHOL: 0
EVER HAD A DRINK FIRST THING IN THE MORNING TO STEADY YOUR NERVES TO GET RID OF A HANGOVER: NO
HOW MANY TIMES IN THE PAST YEAR HAVE YOU HAD 5 OR MORE DRINKS IN A DAY: 0
EVER FELT BAD OR GUILTY ABOUT YOUR DRINKING: NO
TOTAL SCORE: 0
TOTAL SCORE: 0
HAVE YOU EVER FELT YOU SHOULD CUT DOWN ON YOUR DRINKING: NO
ALCOHOL_USE: NO

## 2022-02-23 ASSESSMENT — ACTIVITIES OF DAILY LIVING (ADL): TOILETING: INDEPENDENT

## 2022-02-23 ASSESSMENT — PAIN SCALES - GENERAL: PAIN_LEVEL: 0

## 2022-02-23 NOTE — ANESTHESIA TIME REPORT
Anesthesia Start and Stop Event Times     Date Time Event    2/23/2022 0726 Ready for Procedure     0748 Anesthesia Start     1033 Anesthesia Stop        Responsible Staff  02/23/22    Name Role Begin End    Damien Amezcua M.D. Anesth 0748 1033        Preop Diagnosis (Free Text):  Pre-op Diagnosis     ROTATOR CUFF TEAR ARTHROPATHY        Preop Diagnosis (Codes):    Premium Reason  Non-Premium    Comments:

## 2022-02-23 NOTE — OP REPORT
OPERATIVE NOTE     DATE OF PROCEDURE: 2/23/2022           PRE-OP DIAGNOSIS:  1.  Right shoulder rotator cuff arthropathy           POST-OP DIAGNOSIS: same           PROCEDURE:  1.  Right shoulder reverse shoulder arthroplasty           SURGEON: Timothy Larkin M.D. - Primary           ASSISTANT: Haylie Blair, certified first assist     Certified first assist was required for critical portions of the case including patient positioning manipulation possibly graft preparation retraction suture management wound closure as well as other critical portions of the case.  I be unable to perform these portion of the case by myself there were no other certified first assist available    ANESTHESIA: Interscalene nerve block/general           ESTIMATED BLOOD LOSS: 100 cc                  SPECIMENS: None           COMPLICATIONS: None           CONDITION: Stable           OPERATIVE INDICATIONS AND DESCRIPTION OF PROCEDURE:     Implants:  Tornier perform reverse  Right size 5 humeral stem  25+3 lateralized baseplate  39 sphere  +6 high offset tray  +3 polyinsert  Baseplate screws x4    I met the patient in the preoperative holding area.  I had a full discussion with them regarding multiple options for the patient's condition including nonoperative management.  Again today I offered them nonoperative treatment modalities.  Regarding operative options I specifically I outlined right shoulder shoulder arthroplasty. I discussed some possible complications including bleeding possibly requiring transfusion, infection, neurovascular damage, malunion, nonunion, failure of implants, failure of surgery, chronic pain, need for revision surgery, instability, limb length discrepancy, prolonged rehab, weight bearing restrictions, DVT, PE, MI, stroke and death. After going over risks and benefits making no promises either guaranteed or implied patient elected to proceed.  At this point informed consent was signed.  A surgical marking pen was  used to place my initials on the patient's right shoulder.    Patient was brought back to the operating room.  They were placed semiupright on a beachchair table all bony prominences padded very well to prevent neuropraxia's.  They were secured to the table with a strap.  General anesthesia was introduced.  trimano were used for for positioning.  Right upper extremity was prepped in sterile standard fashion.  At this point a timeout was performed with all parties in the room ceasing activity. Informed consent sheet was visible confirming the patient's name date of birth MRN and matched their wristband. Antibiotics 2 g Ancef/1 g TXA right upper were confirmed and the right upper extremity was confirmed as the correct surgical site.  My surgical initials were visible on this extremity.  At this point we were cleared to proceed with the procedure.    Exam under anesthesia revealed forward flexion to 80 or 90 external rotation 20 internal rotation hip.  No signs of instability    We began by utilizing deltopectoral incision from the coracoid process following the contour of the deltoid.  Sharp dissection carried down through skin Bovie cautery maintained hemostasis at all times.  We identified a cephalic vein took it laterally was protected.  We identified our interval between pack deltoid and identified the clavipectoral fascia.  This was opened up identified her subscap tendon.  There is a centrally full-thickness tear of the subscap from the upper 80% of the tendon.  Because there is significant attenuation we felt that there was no appropriate reason to repair the subscapularis tendon as it is very degenerative in nature.  The most inferior portion of the tendon was still intact.  We did cauterize her 3 systems vessels.  The biceps tendon was missing from the groove.  We released the inferior capsule off the humeral neck taking great care not to violate our latissimus tendon which was inferior aspect of our release.   At this point we had good exposure of the humeral head and turned our attention to her humeral head cut    Once we had excellent exposure of the humeral head we made a cut to and great care not to violate the rotator cuff insertion.  The rotator cuff footprint was essentially bald from all rotator cuff tendon.  There is a little bit of remaining posterior tendon left..  We made an approximate 30 degrees of retroversion.  Remove bone rongeur to our osteophytes off of the humeral neck and began broaching.  We sounded up to our planned size then broached up to our planned size and then inserted a broach again an approximate 30 degrees of retroversion.  Once we had good rotational control this confirmed our size.  We placed a plate on the cut surface turned our attention to the glenoid    We then released labrum off the glenoid once we had good exposure the glenoid use a Shipman to remove any remaining cartilage.  We found the center of the glenoid is used for reference placement of our baseplate.  However we did make sure to keep our baseplate inferior to this in the very lower aspect of the glenoid centered with as much inferior tilt as we could place without remain too much bone.  We placed our pin within our spot and reamed according to our preoperative template.  We then reamed until we appreciated small punctate bleeding from the subchondral bone take great care not to remove too much bone.  At this point we overdrilled for central peg drilled our central hole tapped and placed our baseplate.  This had excellent purchase.  We then placed our anterior posterior compression screws after drilling and measuring.  These had good bite then we placed our superior inferior locking screws one placed at the coracoid base 1 through the inferior scapula.  We then used a circumferential reamer to remove any bony impingement.  We placed our glenosphere which we templated and made sure it was impacted and then locked into  place    We then turned our attention to the humerus.  We trialed with our templated implants.  This demonstrated excellent reduction range of motion with no signs of impingement no signs of instability.  This was her final construct.  We removed our trials prepared a construct on the back table and implanted them into the arm.  They are impacted into place had good rotational control.  Her final polywas impacted into place and shoulder was reduced.  At this point we took it through range of motion check stability which was all excellent.      We then did a Betadine wash for 5 minutes copiously irrigated the wound.  We placed 1 g of vancomycin deep within the wound and began closing.  Just prior to closing we gave 1 more gram TXA.  The wound was very dry at this point.  We closed her interval loosely with nonabsorbable sutures.  We then closed our skin wound in a subcutaneous then running subcuticular fashion.  Dermabond was placed on the incision occlusive sterile bandages were placed.  All counts were correct x2 patient was taken the PACU in stable condition      POSTOPERATIVE PLAN:  Weight-bearing: Nonweightbearing right upper extremity  Antibiotics: Ancef 24 hours postop  Showering: Okay to shower postop day 7, no lotions no scrubbing's, place new dressing.  No tub soaks baths or swimming  Prescriptions: have been E prescribed  Follow-up:  in 2 weeks.  Call with any questions or concerns patient as well as his significant other have been counseled on signs of infection and to call immediately if they develop any of these  Physical Therapy: Begin around 2 weeks    Did call the patient's family to discuss the surgery with them.  Counseled them on signs of infection which they understood.  If they have any issues including any concerns with infection including redness drainage swelling pain they are to call our office immediately or go to the local emergency department    This is dictated with voice recognition  software please excuse any errors

## 2022-02-23 NOTE — PROGRESS NOTES
Patient to the floor on 10 L via Oxy mask, walked to bed, updated on plan of care, patient states understanding. Assessment done at this time, state numbness in right fingers, dressing CDI, ice on, immobilizer on. Bed low, locked, call light within the reach. VS per protocol.

## 2022-02-23 NOTE — OR NURSING
1028: To PACU from OR via gurney, sleeping, respirations spontaneous and non-labored. Icepack applied over c/d/i R shoulder surgical dressings. R shoulder immobilizer insitu, R radial +2. HOB elevated to 45 degrees. Plan to keep pt in PACU for full hour per [facility] protocol. Pt brought CPAP mask only, no machine so unable to place on own CPAP therapy.  1040: [place], denies pain/nausea  1048: Xrays complete  1055: Rouses to name, DB&C   1110: dozing  1125: Rouses easily, sipping po water, O2 d/julee.   1130: SpO2 drops to 79-82% on RA while pt awake and sipping soda. O2 resumed and plan for pt to transfer to [facility] for [facility]AS O2 protocol.   1140: Stable on for transfer to [facility], awaiting room assignment.

## 2022-02-23 NOTE — ANESTHESIA POSTPROCEDURE EVALUATION
Patient: Ivelisse Montague    Procedure Summary     Date: 02/23/22 Room / Location:  OR 03 / SURGERY [facility]    Anesthesia Start: 0748 Anesthesia Stop: 1033    Procedure: ARTHROPLASTY, SHOULDER, TOTAL (Right Shoulder) Diagnosis: (ROTATOR CUFF TEAR ARTHROPATHY)    Surgeons: Timothy Larkin M.D. Responsible Provider: Damien Amezcua M.D.    Anesthesia Type: general, peripheral nerve block ASA Status: 3          Final Anesthesia Type: general, peripheral nerve block  Last vitals  BP   Blood Pressure: 132/73    Temp   37 °C (98.6 °F)    Pulse   66   Resp   16    SpO2   99 %      Anesthesia Post Evaluation    Patient location during evaluation: PACU  Patient participation: complete - patient participated  Level of consciousness: awake and alert  Pain score: 0    Airway patency: patent  Anesthetic complications: no  Cardiovascular status: hemodynamically stable  Respiratory status: acceptable  Hydration status: euvolemic    PONV: none          No complications documented.     Nurse Pain Score: 0 (NPRS)

## 2022-02-23 NOTE — OR NURSING
0612: Brought patient back to pre-op and assumed care.  0734: Patient allergies and NPO status verified, home medication reconciliation completed and belongings secured. Patient verbalizes understanding of pain scale, expected course of stay and plan of care. Surgical site verified with patient. IV access established. Sequentials placed on legs.

## 2022-02-23 NOTE — ANESTHESIA PREPROCEDURE EVALUATION
Case: [id number] Date/Time: 02/23/22 0715    Procedure: ARTHROPLASTY, SHOULDER, TOTAL (Right )    Pre-op diagnosis: ROTATOR CUFF TEAR ARTHROPATHY    Location:  OR  / SURGERY [facility]    Surgeons: Timothy Larkin M.D.          Relevant Problems   ANESTHESIA   (positive) EDWARD (obstructive sleep apnea)      CARDIAC   (positive) Congestive heart failure, NYHA class 2 and ACC/AHA stage C (HCC)   (positive) Coronary artery disease involving native coronary artery of native heart without angina pectoris   (positive) New onset of congestive heart failure (HCC)      GI   (positive) GERD (gastroesophageal reflux disease)      ENDO   (positive) Hypothyroidism       Physical Exam    Airway   Mallampati: II  TM distance: >3 FB  Neck ROM: full       Cardiovascular - normal exam  Rhythm: regular  Rate: normal  (-) murmur     Dental   (+) upper dentures  Comments: No teeth         Pulmonary - normal exam  Breath sounds clear to auscultation  (+) decreased breath sounds     Abdominal    Neurological - normal exam               Anesthesia Plan    ASA 3   ASA physical status 3 criteria: CAD/stents (> 3 months) and morbid obesity - BMI greater than or equal to 40    Plan - general and peripheral nerve block     Peripheral nerve block will be post-op pain control  Airway plan will be ETT          Induction: intravenous    Postoperative Plan: Postoperative administration of opioids is intended.    Pertinent diagnostic labs and testing reviewed    Informed Consent:    Anesthetic plan and risks discussed with patient.    Use of blood products discussed with: patient whom consented to blood products.

## 2022-02-24 VITALS
DIASTOLIC BLOOD PRESSURE: 59 MMHG | HEART RATE: 61 BPM | HEIGHT: 62 IN | WEIGHT: 257.72 LBS | RESPIRATION RATE: 18 BRPM | OXYGEN SATURATION: 95 % | BODY MASS INDEX: 47.43 KG/M2 | TEMPERATURE: 98 F | SYSTOLIC BLOOD PRESSURE: 117 MMHG

## 2022-02-24 PROCEDURE — 96376 TX/PRO/DX INJ SAME DRUG ADON: CPT

## 2022-02-24 PROCEDURE — 700111 HCHG RX REV CODE 636 W/ 250 OVERRIDE (IP): Performed by: STUDENT IN AN ORGANIZED HEALTH CARE EDUCATION/TRAINING PROGRAM

## 2022-02-24 PROCEDURE — 700105 HCHG RX REV CODE 258: Performed by: STUDENT IN AN ORGANIZED HEALTH CARE EDUCATION/TRAINING PROGRAM

## 2022-02-24 PROCEDURE — G0378 HOSPITAL OBSERVATION PER HR: HCPCS

## 2022-02-24 PROCEDURE — 700101 HCHG RX REV CODE 250: Performed by: STUDENT IN AN ORGANIZED HEALTH CARE EDUCATION/TRAINING PROGRAM

## 2022-02-24 RX ADMIN — KETOROLAC TROMETHAMINE 30 MG: 30 INJECTION, SOLUTION INTRAMUSCULAR at 05:04

## 2022-02-24 RX ADMIN — CEFAZOLIN 2 G: 1 INJECTION, POWDER, FOR SOLUTION INTRAVENOUS at 05:03

## 2022-02-24 ASSESSMENT — PAIN DESCRIPTION - PAIN TYPE: TYPE: ACUTE PAIN;SURGICAL PAIN

## 2022-02-24 NOTE — PROGRESS NOTES
4 Eyes Skin Assessment Completed by Gary, RN and Jamison RN.    Head WDL  Ears WDL  Nose WDL  Mouth WDL  Neck WDL  Breast/Chest WDL  Shoulder Blades WDL  Spine WDL  (R) Arm/Elbow/Hand Redness sx incision R shoulder   (L) Arm/Elbow/Hand WDL  Abdomen WDL  Groin WDL  Scrotum/Coccyx/Buttocks WDL  (R) Leg WDL  (L) Leg WDL  (R) Heel/Foot/Toe WDL  (L) Heel/Foot/Toe WDL          Devices In Places Pulse Ox      Interventions In Place N/A    Possible Skin Injury No    Pictures Uploaded Into Epic N/A  Wound Consult Placed N/A  RN Wound Prevention Protocol Ordered No

## 2022-02-24 NOTE — PROGRESS NOTES
Report received from Day RN, assumed care of pt.   POC and medications reviewed with pt. Pt verbalized understanding.   AOx4, sleepy but arousable to voice   Denies pain, SOB, or dizziness at this time.   Safety measures in place.

## 2022-02-24 NOTE — CARE PLAN
The patient is Watcher - Medium risk of patient condition declining or worsening    Shift Goals  Clinical Goals: oxygentation saturation will be at or above 90% before discharge, patient will use IS every hour for 10x breaths  Patients Goals: go home       Progress made toward(s) clinical / shift goals:  progressing, patient at 94% on RA

## 2022-02-24 NOTE — DIETARY
NUTRITION SERVICES: BMI - Pt with BMI >40 (=Body mass index is 47.14 kg/m².), Class III obesity. Wt measured via stand up scale. Weight loss counseling not appropriate in acute care setting.     RECOMMEND - If appropriate at DC please refer to outpatient nutrition services for weight management.     RD available PRN.

## 2022-02-24 NOTE — PROGRESS NOTES
This RN has provided patient with detailed discharge instructions about follow up appointments, dressing change, medication administration, weight bearing restriction on right arm, signs and symptoms of infection. Patient states understanding. Patient sitting at the edge of the bed, waiting for  to pick her up.

## 2022-02-24 NOTE — THERAPY
"Occupational Therapy   Initial Evaluation     Patient Name: Ivelisse Montague  Age:  61 y.o., Sex:  female  Medical Record #: [id number]  Today's Date: 2/23/2022     Precautions  Precautions: Immobilizer Right Upper Extremity  Comments: no pendulum    Assessment  Patient is a 61 y.o. female seen for OT evaluation following reverse right TSA. Pt is right hand dominant. Her spouse was present for all education and assisted with donning the shoulder immobilizer after UB dressing with gown. OT provided pt and spouse with education on compensatory strategies for UB ADLs, donning/doffing immobilizer, sleeping positions, proper fit of immobilizer and elbow, wrist and hand ROM. Pt and spouse verbalized understanding of all education. Pt did not get fully dressed during eval due to IV still running and patient with difficulty maintaining O2 saturations on RA.      Plan    Recommend Occupational Therapy to see patient for one more visit if she remains in the hospital overnight for the following treatments:  Adaptive Equipment, Self Care/Activities of Daily Living, Therapeutic Activities, and Therapeutic Exercises.    DC Equipment Recommendations: None    Discharge Recommendations: Anticipate that the patient will have no further occupational therapy needs after discharge from the hospital (follow up with outpatient when appropriate per surgeon)     Subjective    \"I still hope I can go home tonight.\"     Objective       02/23/22 1602   Prior Living Situation   Prior Services Home-Independent   Equipment Owned None   Lives with - Patient's Self Care Capacity Spouse   Comments Pt's spouse at bedside amd very supportive   Prior Level of ADL Function   Self Feeding Independent   Grooming / Hygiene Independent   Bathing Independent   Dressing Independent   Toileting Independent   Prior Level of IADL Function   Medication Management Independent   Laundry Independent   Kitchen Mobility Independent   Home Management Independent "   Shopping Independent   Prior Level Of Mobility Independent Without Device in Community   Driving / Transportation Driving Independent   Occupation (Pre-Hospital Vocational) Retired Due To Disability   Precautions   Precautions Immobilizer Right Upper Extremity   Comments no pendulum   Vitals   O2 (LPM) 1   O2 Delivery Device Silicone Nasal Cannula   Pain 0 - 10 Group   Therapist Pain Assessment During Activity;Nurse Notified;0   Cognition    Cognition / Consciousness WDL   Level of Consciousness Alert   Comments a little drowsy but awakens easily and able to attend to all education   Active ROM Upper Body   Dominant Hand Right   Comments R UE in immobilizer, pt numb from nerve block but able to wiggle the fingers of her R hand   Sensation Upper Body   Comments pt reports that she has baseline numbness in her R hand   Balance Assessment   Sitting Balance (Static) Good   Sitting Balance (Dynamic) Good   Standing Balance (Static) Good   Standing Balance (Dynamic) Good   Weight Shift Sitting Good   Weight Shift Standing Good   Comments stood without AD   Bed Mobility    Supine to Sit Supervised  (HOB elevated)   Sit to Supine Supervised  (HOB elevated)   Scooting Supervised   Comments pt plans to sleep in a recliner   ADL Assessment   Upper Body Dressing Maximal Assist  (gown and shoulder immobilizer)   Lower Body Dressing Moderate Assist  (assist to pull up underpants)   Comments spouse present for education on UB dressing and donning/doffing immobilizer. Pt did not dress in her clothes to go home as she still has an IV in place and she isn't sure if she is discharging home today as her O2 saturations have been low. Pt remains hopeful that she can discharge home but isn't certain that she will meet criteria   How much help from another person does the patient currently need...   Putting on and taking off regular lower body clothing? 2   Bathing (including washing, rinsing, and drying)? 2   Toileting, which includes  using a toilet, bedpan, or urinal? 3   Putting on and taking off regular upper body clothing? 2   Taking care of personal grooming such as brushing teeth? 3   Eating meals? 3   6 Clicks Daily Activity Score 15   Functional Mobility   Sit to Stand Supervised   Bed, Chair, Wheelchair Transfer Supervised   Activity Tolerance   Comments sat EOB and stood EOB for dressing and immobilizer training   Patient / Family Goals   Patient / Family Goal #1 to go home   Short Term Goals   Short Term Goal # 1 Pt will don immobilizer with mod assist from spouse   Short Term Goal # 2 Pt will complete LB dressing with min assist   Short Term Goal # 3 Pt will demo wrist, hand and elbow ROM with supervision   Education Group   Education Provided Role of Occupational Therapist;Activities of Daily Living;Brace Wear and Care;Upper Extremity Range of Motion   Role of Occupational Therapist Patient Response Patient;Family;Acceptance;Explanation;Action Demonstration   Upper Ext ROM Patient Response Patient;Family;Acceptance;Explanation;Demonstration;Verbal Demonstration  (elbow, wrist and hand okay - no pendulums)   Brace Wear & Care Patient Response Patient;Family;Acceptance;Explanation;Demonstration;Handout;Verbal Demonstration  (shoulder immobilizer)   ADL Patient Response Patient;Family;Acceptance;Explanation;Demonstration;Handout;Verbal Demonstration   Problem List   Problem List Decreased Active Daily Living Skills;Decreased Homemaking Skills;Decreased Functional Mobility

## 2022-02-24 NOTE — DISCHARGE INSTRUCTIONS
Discharge Instructions    Discharged to home by car with relative. Discharged via wheelchair, hospital escort: Yes.  Special equipment needed: Immobilizer    Be sure to schedule a follow-up appointment with your primary care doctor or any specialists as instructed.     Discharge Plan:   Diet Plan: Discussed  Activity Level: Discussed  Confirmed Follow up Appointment: Patient to Call and Schedule Appointment  Confirmed Symptoms Management: Discussed  Medication Reconciliation Updated: Yes  Influenza Vaccine Indication: Patient Refuses    I understand that a diet low in cholesterol, fat, and sodium is recommended for good health. Unless I have been given specific instructions below for another diet, I accept this instruction as my diet prescription.   Other diet: Home diet    Special Instructions: Discharge instructions for the Orthopedic Patient    Follow up with Primary Care Physician within 2 weeks of discharge to home, regarding:  Review of medications and diagnostic testing.  Surveillance for medical complications.  Workup and treatment of osteoporosis, if appropriate.     -Is this a Hip/Knee/Shoulder Joint Replacement patient? Yes     -Is this patient being discharged with medication to prevent blood clots?  Yes, Aspirin Aspirin, ASA oral tablets  What is this medicine?  ASPIRIN (AS pir in) is a pain reliever. It is used to treat mild pain and fever. This medicine is also used as directed by a doctor to prevent and to treat heart attacks, to prevent strokes and blood clots, and to treat arthritis or inflammation.  This medicine may be used for other purposes; ask your health care provider or pharmacist if you have questions.  COMMON BRAND NAME(S): Aspir-Low, Aspir-Yesenia, Aspirtab, Kaden Advanced Aspirin, Kaden Aspirin, Kaden Aspirin Extra Strength, Kaden Aspirin Plus, Kaden Extra Strength, Kaden Extra Strength Plus, Kaden Genuine Aspirin, Kaden Womens Aspirin, Bufferin, Bufferin Extra Strength, Bufferin Low  Dose  What should I tell my health care provider before I take this medicine?  They need to know if you have any of these conditions:  · anemia  · asthma  · bleeding problems  · child with chickenpox, the flu, or other viral infection  · diabetes  · gout  · if you frequently drink alcohol containing drinks  · kidney disease  · liver disease  · low level of vitamin K  · lupus  · smoke tobacco  · stomach ulcers or other problems  · an unusual or allergic reaction to aspirin, tartrazine dye, other medicines, dyes, or preservatives  · pregnant or trying to get pregnant  · breast-feeding  How should I use this medicine?  Take this medicine by mouth with a glass of water. Follow the directions on the package or prescription label. You can take this medicine with or without food. If it upsets your stomach, take it with food. Do not take your medicine more often than directed.  Talk to your pediatrician regarding the use of this medicine in children. While this drug may be prescribed for children as young as 12 years of age for selected conditions, precautions do apply. Children and teenagers should not use this medicine to treat chicken pox or flu symptoms unless directed by a doctor.  Patients over 65 years old may have a stronger reaction and need a smaller dose.  Overdosage: If you think you have taken too much of this medicine contact a poison control center or emergency room at once.  NOTE: This medicine is only for you. Do not share this medicine with others.  What if I miss a dose?  If you are taking this medicine on a regular schedule and miss a dose, take it as soon as you can. If it is almost time for your next dose, take only that dose. Do not take double or extra doses.  What may interact with this medicine?  Do not take this medicine with any of the following medications:  · cidofovir  · ketorolac  · probenecid  This medicine may also interact with the following  medications:  · alcohol  · alendronate  · bismuth subsalicylate  · flavocoxid  · herbal supplements like feverfew, garlic, tj, ginkgo biloba, horse chestnut  · medicines for diabetes or glaucoma like acetazolamide, methazolamide  · medicines for gout  · medicines that treat or prevent blood clots like enoxaparin, heparin, ticlopidine, warfarin  · other aspirin and aspirin-like medicines  · NSAIDs, medicines for pain and inflammation, like ibuprofen or naproxen  · pemetrexed  · sulfinpyrazone  · varicella live vaccine  This list may not describe all possible interactions. Give your health care provider a list of all the medicines, herbs, non-prescription drugs, or dietary supplements you use. Also tell them if you smoke, drink alcohol, or use illegal drugs. Some items may interact with your medicine.  What should I watch for while using this medicine?  If you are treating yourself for pain, tell your doctor or health care professional if the pain lasts more than 10 days, if it gets worse, or if there is a new or different kind of pain. Tell your doctor if you see redness or swelling. Also, check with your doctor if you have a fever that lasts for more than 3 days. Only take this medicine to prevent heart attacks or blood clotting if prescribed by your doctor or health care professional.  Do not take aspirin or aspirin-like medicines with this medicine. Too much aspirin can be dangerous. Always read the labels carefully.  This medicine can irritate your stomach or cause bleeding problems. Do not smoke cigarettes or drink alcohol while taking this medicine. Do not lie down for 30 minutes after taking this medicine to prevent irritation to your throat.  If you are scheduled for any medical or dental procedure, tell your healthcare provider that you are taking this medicine. You may need to stop taking this medicine before the procedure.  This medicine may be used to treat migraines. If you take migraine medicines  for 10 or more days a month, your migraines may get worse. Keep a diary of headache days and medicine use. Contact your healthcare professional if your migraine attacks occur more frequently.  What side effects may I notice from receiving this medicine?  Side effects that you should report to your doctor or health care professional as soon as possible:  · allergic reactions like skin rash, itching or hives, swelling of the face, lips, or tongue  · breathing problems  · changes in hearing, ringing in the ears  · confusion  · general ill feeling or flu-like symptoms  · pain on swallowing  · redness, blistering, peeling or loosening of the skin, including inside the mouth or nose  · signs and symptoms of bleeding such as bloody or black, tarry stools; red or dark-brown urine; spitting up blood or brown material that looks like coffee grounds; red spots on the skin; unusual bruising or bleeding from the eye, gums, or nose  · trouble passing urine or change in the amount of urine  · unusually weak or tired  · yellowing of the eyes or skin  Side effects that usually do not require medical attention (report to your doctor or health care professional if they continue or are bothersome):  · diarrhea or constipation  · headache  · nausea, vomiting  · stomach gas, heartburn  This list may not describe all possible side effects. Call your doctor for medical advice about side effects. You may report side effects to FDA at 9-082-FDA-0528.  Where should I keep my medicine?  Keep out of the reach of children.  Store at room temperature between 15 and 30 degrees C (59 and 86 degrees F). Protect from heat and moisture. Do not use this medicine if it has a strong vinegar smell. Throw away any unused medicine after the expiration date.  NOTE: This sheet is a summary. It may not cover all possible information. If you have questions about this medicine, talk to your doctor, pharmacist, or health care provider.  © 2020 [employer] (2018-01-30 10:42:13)      · Is patient discharged on Warfarin / Coumadin?   No     Depression / Suicide Risk    As you are discharged from this [facility] facility, it is important to learn how to keep safe from harming yourself.    Recognize the warning signs:  · Abrupt changes in personality, positive or negative- including increase in energy   · Giving away possessions  · Change in eating patterns- significant weight changes-  positive or negative  · Change in sleeping patterns- unable to sleep or sleeping all the time   · Unwillingness or inability to communicate  · Depression  · Unusual sadness, discouragement and loneliness  · Talk of wanting to die  · Neglect of personal appearance   · Rebelliousness- reckless behavior  · Withdrawal from people/activities they love  · Confusion- inability to concentrate     If you or a loved one observes any of these behaviors or has concerns about self-harm, here's what you can do:  · Talk about it- your feelings and reasons for harming yourself  · Remove any means that you might use to hurt yourself (examples: pills, rope, extension cords, firearm)  · Get professional help from the community (Mental Health, Substance Abuse, psychological counseling)  · Do not be alone:Call your Safe Contact- someone whom you trust who will be there for you.  · Call your local CRISIS HOTLINE 332-9138 or 782-396-2572  · Call your local Children's Mobile Crisis Response Team [place] (013) 096-8318 or www.[employer]  · Call the toll free National Suicide Prevention Hotlines   · National Suicide Prevention Lifeline 534-612-XGMI (0052)  · National Hope Line Network 800-SUICIDE (729-1230)            ACTIVITY: Rest and take it easy for the first 24 hours.  A responsible adult is recommended to remain with you during that time.  It is normal to feel sleepy.  We encourage you to not do anything that requires balance, judgment or coordination.    MILD FLU-LIKE SYMPTOMS ARE  NORMAL. YOU MAY EXPERIENCE GENERALIZED MUSCLE ACHES, THROAT IRRITATION, HEADACHE AND/OR SOME NAUSEA.    FOR 24 HOURS DO NOT:  Drive, operate machinery or run household appliances.  Drink beer or alcoholic beverages.   Make important decisions or sign legal documents.    SPECIAL INSTRUCTIONS:    SHOULDER REPLACEMENT, AFTER-CARE GUIDELINES     These instructions provide you with information on caring for yourself and your shoulder after surgery. Your health care provider may also give you instructions that are more specific. Your treatment has been planned and performed according to current medical practices but problems sometimes occur. Call your health care provider if you have any problems or questions.     WHAT TO EXPECT AFTER THE PROCEDURE   After your procedure, your shoulder and arm will typically be stiff, sore, and bruised. This will improve over time.     HOME CARE INSTRUCTIONS   · Follow your home pain management plan as discussed with your nurse and as directed by your provider.   · It is important to follow any scheduled pain medications as directed for maximal pain relief.   · If prescribed opioid medication, the goal is to use opioids ONLY IF NEEDED and to wean off prescription pain medicine as soon as possible.   · Apply ice to the injured area for several days after surgery:   · Put ice in a plastic bag.   · Place a towel between your skin and the bag.   · Leave the ice on for 20 minutes, 2-3 times a day at a minimum.   · You may resume your normal diet and activities as directed by your provider.   · Your arm will be in a sling. You will need to wear this for 4-6 weeks after surgery.   · It may be more comfortable to sleep in a recliner for several days or weeks after surgery.   · Do not use your arm to push yourself up in bed or from a chair.   · If prescribed a home exercise plan, follow the program of home exercises as suggested by your provider and/or occupational therapist. Do the exercises at  least 3x daily as directed.   · Do not lift anything heavier than a cup of coffee for the first 6 weeks after surgery.   · Ask for help. If you do not have adequate assistance at home, please seek advice from your provider about home care or other services.   · Change dressings only if necessary and as directed. Keep wound dry and covered until otherwise directed by your provider.   · Make sure that you have a follow-up appointment with your provider after surgery.     SEEK URGENT MEDICAL CARE IF:   · You have redness, swelling, or increased pain at your operative site.   · You see pus coming from the wound.   · You have a fever greater than 100.5° F.   · You notice a bad smell coming from the wound or dressing.   · The edges of the wound break open after suture or staple removal.   · You have increasing pain with movement of the shoulder.   · You develop a rash.   · You have chest pain or shortness of breath.     This information is not intended to replace advice given to you by your health care provider. Please discuss any specific questions you have with your health care provider.       DIET: To avoid nausea, slowly advance diet as tolerated, avoiding spicy or greasy foods for the first day.  Add more substantial food to your diet according to your physician's instructions. INCREASE FLUIDS AND FIBER TO AVOID CONSTIPATION.    FOLLOW-UP APPOINTMENT:  A follow-up appointment should be arranged with your doctor; call to schedule.    You should CALL YOUR PHYSICIAN if you develop:  Fever greater than 101 degrees F.  Pain not relieved by medication, or persistent nausea or vomiting.  Excessive bleeding (blood soaking through dressing) or unexpected drainage from the wound.  Extreme redness or swelling around the incision site, drainage of pus or foul smelling drainage.  Inability to urinate or empty your bladder within 8 hours.  Problems with breathing or chest pain.    You should call 911 if you develop problems with  breathing or chest pain.  If you are unable to contact your doctor or surgical center, you should go to the nearest emergency room or urgent care center.  Physician's telephone #: [phone]    If any questions arise, call your doctor.  If your doctor is not available, please feel free to call the Surgical Center at [phone].     A registered nurse may call you a few days after your surgery to see how you are doing after your procedure.    MEDICATIONS: Resume taking daily medication.  Take prescribed pain medication with food.  If no medication is prescribed, you may take non-aspirin pain medication if needed.  PAIN MEDICATION CAN BE VERY CONSTIPATING.  Take a stool softener or laxative such as senokot, pericolace, or milk of magnesia if needed.    Prescription given for ..  Last pain medication given at ..    If your physician has prescribed pain medication that includes Acetaminophen (Tylenol), do not take additional Acetaminophen (Tylenol) while taking the prescribed medication.    Depression / Suicide Risk    As you are discharged from this [facility] facility, it is important to learn how to keep safe from harming yourself.    Recognize the warning signs:  · Abrupt changes in personality, positive or negative- including increase in energy   · Giving away possessions  · Change in eating patterns- significant weight changes-  positive or negative  · Change in sleeping patterns- unable to sleep or sleeping all the time   · Unwillingness or inability to communicate  · Depression  · Unusual sadness, discouragement and loneliness  · Talk of wanting to die  · Neglect of personal appearance   · Rebelliousness- reckless behavior  · Withdrawal from people/activities they love  · Confusion- inability to concentrate     If you or a loved one observes any of these behaviors or has concerns about self-harm, here's what you can do:  · Talk about it- your feelings and reasons for harming yourself  · Remove any means  that you might use to hurt yourself (examples: pills, rope, extension cords, firearm)  · Get professional help from the community (Mental Health, Substance Abuse, psychological counseling)  · Do not be alone:Call your Safe Contact- someone whom you trust who will be there for you.  · Call your local CRISIS HOTLINE 076-0494 or 874-482-8962  · Call your local Children's Mobile Crisis Response Team [place] (882) 364-2484 or [employer]  · Call the toll free National Suicide Prevention Hotlines   · National Suicide Prevention Lifeline 229-882-UFHD (1324)  National Hope Line Network 800-SUICIDE (300-0420)    Discharge Education for patients on EDWARD (Obstructive Sleep Apnea) Protocol    Prior to receiving sedation or anesthesia, we screen all patients for Obstructive Sleep Apnea.  During your screening, you were identified as having Obstructive Sleep Apnea(EDWARD).    What is Obstructive Sleep Apnea?  Sleep apnea (AP-ne-ah) is a common disorder which involves breathing pauses that occur during sleep.  These can last from 10 seconds to a minute or longer.  Normal breathing resumes often with a loud snort or choking sound.    Sleep apnea occurs in all age groups and both genders but is more common in men and people over 40 years of age.  It has been estimated that as many as 18 million Americans have sleep apnea.  Most people who have sleep apnea don’t know they have it because it only occurs during sleep.  A family member and/or bed partner may first notice the signs of sleep apnea.  Sleep apnea is a chronic (ongoing) condition that disrupts the quality and quantity of your sleep repeatedly throughout the night.  This often results in excessive daytime sleepiness or fatigue during the day.  It may also contribute to high blood pressure, heart problems, and complications following medications used for surgery and procedures.      We recommend that you should be with an adult observer for at least 24 hours after  your sedation/anesthesia.  If you have a CPAP machine, you should wear it during any sleep period (day or night) for the week following your procedure.  We encourage you to sleep on your side or in a sitting position, even with napping.  Lying flat on your back increases the risk of apnea and airway obstruction during your post procedure recovery period.    It is important to prevent over-sedation that could increase your risk for apnea.  Please take all pain medication as directed by your physician.  If you are not getting pain relief, please contact your physician to discuss possible approaches to relieving pain while minimizing medications that can affect your breathing and oxygen levels.        Peripheral Nerve Block Discharge Instructions from Same Day Surgery and Inpatient :    What to Expect - Upper Extremity  · You may experience numbness and weakness in shoulder, arm and hand  on the same side as your surgery  · This is normal. For some people, this may be an unpleasant sensation. Be very careful with your numb limb  · Ask for help when you need it  Shoulder Surgery Side Effects  · In addition to numbness and weakness you may experience other symptoms  · Other nerves that are close to those nerves injected can also be affected by local anesthesia  · You may experience a hoarseness in your voice  · Your breathing may feel different  · You may also notice drooping of your eyelid, pupil constriction, and decreased sweating, on the side of your surgery  · All of these side effects are normal and will resolve when the local anesthetic wears off   Prevent Injury  · Protect the limb like a baby  · Beware of exposing your limb to extreme heat or cold or trauma  · The limb may be injured without you noticing because it is numb  · Keep the limb elevated whenever possible  · Do not sleep on the limb  · Change the position of the limb regularly  · Avoid putting pressure on your surgical limb  Pain Control  · The  "initial block on the day of surgery will make your extremity feel \"numb\"  · Any consecutive injection including prior to discharge from the hospital will make your extremity feel \"numb\"  · You may feel an aching or burning when the local anesthesia starts to wear off  · Take pain pills as prescribed by your surgeon  · Call your surgeon or anesthesiologist if you do not have adequate pain control    "

## 2022-02-24 NOTE — CARE PLAN
The patient is Stable - Low risk of patient condition declining or worsening    Shift Goals  Clinical Goals: pain control, maintain adequate O2    Progress made toward(s) clinical / shift goals:    Problem: Fall Risk  Goal: Patient will remain free from falls  2/23/2022 2147 by Gary Cortes R.N.  Outcome: Progressing  Note: PT utilizes call light appropriately. Fall precautions in place.   2/23/2022 2146 by Gary Cortes R.N.  Outcome: Progressing     Problem: Knowledge Deficit - Standard  Goal: Patient and family/care givers will demonstrate understanding of plan of care, disease process/condition, diagnostic tests and medications  Outcome: Progressing  Note: Reviewed plan of care with pt, pt verbalized understanding, no questions at this time.      Problem: Post-Operative Shoulder Replacement  Goal: Patient will achieve optimal shoulder replacement post-surgical outcomes  Outcome: Progressing  Goal: Proper fit of orthotic device will be assessed and maintained  Outcome: Progressing       Patient is not progressing towards the following goals:

## 2022-02-24 NOTE — PROGRESS NOTES
"Orthopaedic Progress Note  POD#1 s/p right reverse total shoulder arthroplasty    Patient seen and examined  Reports no pain  No morris catheter, voiding well   Denies fevers, chills, chest pain   Breathing nasal cannula oxygen denies any shortness of breath          Examination:   /75   Pulse 92   Temp 36.6 °C (97.9 °F) (Temporal)   Resp 18   Ht 1.575 m (5' 2\")   Wt 117 kg (257 lb 11.5 oz)   SpO2 95%     No acute distress  Breathing unlabored  Dressing clean dry and intact   Motor: Able to fire [employer] interosseous musculature deltoid, sensation intact median radial ulnar nerves diminished axillary nerve sensation at this time  Compartments soft/compressible  Toes warm and well perfused, cap refill <2      A/P: POD#1 s/p right reverse total shoulder arthroplasty    DVT Prophylaxis- TEDS/SCDs  Weight Bearing Status-nonweightbearing sling  PT/OT  Antibiotics: Postop  Case Coordination  Okay for discharge today  "

## 2022-02-24 NOTE — CARE PLAN
The patient is Watcher - Medium risk of patient condition declining or worsening    Shift Goals  Clinical Goals: patient oxygentation will be above 90% by 1800    Progress made toward(s) clinical / shift goals: not progressing, patient de sats to 86% on RA. IS encouraged 10x every hour.

## 2022-05-08 ENCOUNTER — HOSPITAL ENCOUNTER (INPATIENT)
Facility: MEDICAL CENTER | Age: 62
LOS: 1 days | DRG: 300 | End: 2022-05-09
Attending: EMERGENCY MEDICINE | Admitting: STUDENT IN AN ORGANIZED HEALTH CARE EDUCATION/TRAINING PROGRAM
Payer: MEDICARE

## 2022-05-08 DIAGNOSIS — I82.621: ICD-10-CM

## 2022-05-08 DIAGNOSIS — I47.10 SVT (SUPRAVENTRICULAR TACHYCARDIA) (HCC): ICD-10-CM

## 2022-05-08 DIAGNOSIS — S46.211A BICEPS MUSCLE TEAR, RIGHT, INITIAL ENCOUNTER: ICD-10-CM

## 2022-05-08 PROCEDURE — 99285 EMERGENCY DEPT VISIT HI MDM: CPT

## 2022-05-09 ENCOUNTER — APPOINTMENT (OUTPATIENT)
Dept: RADIOLOGY | Facility: MEDICAL CENTER | Age: 62
DRG: 300 | End: 2022-05-09
Attending: EMERGENCY MEDICINE
Payer: MEDICARE

## 2022-05-09 VITALS
BODY MASS INDEX: 49.17 KG/M2 | HEART RATE: 62 BPM | HEIGHT: 62 IN | DIASTOLIC BLOOD PRESSURE: 60 MMHG | RESPIRATION RATE: 18 BRPM | OXYGEN SATURATION: 96 % | SYSTOLIC BLOOD PRESSURE: 114 MMHG | WEIGHT: 267.2 LBS | TEMPERATURE: 99.8 F

## 2022-05-09 PROBLEM — I82.621 ACUTE DEEP VEIN THROMBOSIS (DVT) OF RIGHT UPPER EXTREMITY (HCC): Status: ACTIVE | Noted: 2022-05-09

## 2022-05-09 PROBLEM — E66.01 MORBID OBESITY (HCC): Status: ACTIVE | Noted: 2019-09-27

## 2022-05-09 PROBLEM — I82.621 ACUTE DEEP VEIN THROMBOSIS (DVT) OF OTHER VEIN OF RIGHT UPPER EXTREMITY (HCC): Status: ACTIVE | Noted: 2022-05-09

## 2022-05-09 PROBLEM — I10 HYPERTENSION: Status: ACTIVE | Noted: 2022-05-09

## 2022-05-09 LAB
ALBUMIN SERPL BCP-MCNC: 3.7 G/DL (ref 3.2–4.9)
ALBUMIN/GLOB SERPL: 1.3 G/DL
ALP SERPL-CCNC: 105 U/L (ref 30–99)
ALT SERPL-CCNC: 15 U/L (ref 2–50)
ANION GAP SERPL CALC-SCNC: 12 MMOL/L (ref 7–16)
APTT PPP: 27.3 SEC (ref 24.7–36)
AST SERPL-CCNC: 25 U/L (ref 12–45)
BASOPHILS # BLD AUTO: 0.4 % (ref 0–1.8)
BASOPHILS # BLD: 0.04 K/UL (ref 0–0.12)
BILIRUB SERPL-MCNC: 0.5 MG/DL (ref 0.1–1.5)
BUN SERPL-MCNC: 17 MG/DL (ref 8–22)
CALCIUM SERPL-MCNC: 8.6 MG/DL (ref 8.4–10.2)
CHLORIDE SERPL-SCNC: 100 MMOL/L (ref 96–112)
CO2 SERPL-SCNC: 24 MMOL/L (ref 20–33)
CREAT SERPL-MCNC: 1.23 MG/DL (ref 0.5–1.4)
EOSINOPHIL # BLD AUTO: 0.23 K/UL (ref 0–0.51)
EOSINOPHIL NFR BLD: 2.3 % (ref 0–6.9)
ERYTHROCYTE [DISTWIDTH] IN BLOOD BY AUTOMATED COUNT: 52.4 FL (ref 35.9–50)
GFR SERPLBLD CREATININE-BSD FMLA CKD-EPI: 49 ML/MIN/1.73 M 2
GLOBULIN SER CALC-MCNC: 2.9 G/DL (ref 1.9–3.5)
GLUCOSE SERPL-MCNC: 147 MG/DL (ref 65–99)
HCT VFR BLD AUTO: 39.1 % (ref 37–47)
HGB BLD-MCNC: 12.4 G/DL (ref 12–16)
IMM GRANULOCYTES # BLD AUTO: 0.04 K/UL (ref 0–0.11)
IMM GRANULOCYTES NFR BLD AUTO: 0.4 % (ref 0–0.9)
INR PPP: 0.95 (ref 0.87–1.13)
LYMPHOCYTES # BLD AUTO: 1.43 K/UL (ref 1–4.8)
LYMPHOCYTES NFR BLD: 14 % (ref 22–41)
MCH RBC QN AUTO: 29.1 PG (ref 27–33)
MCHC RBC AUTO-ENTMCNC: 31.7 G/DL (ref 33.6–35)
MCV RBC AUTO: 91.8 FL (ref 81.4–97.8)
MONOCYTES # BLD AUTO: 0.56 K/UL (ref 0–0.85)
MONOCYTES NFR BLD AUTO: 5.5 % (ref 0–13.4)
NEUTROPHILS # BLD AUTO: 7.91 K/UL (ref 2–7.15)
NEUTROPHILS NFR BLD: 77.4 % (ref 44–72)
NRBC # BLD AUTO: 0 K/UL
NRBC BLD-RTO: 0 /100 WBC
PLATELET # BLD AUTO: 284 K/UL (ref 164–446)
PMV BLD AUTO: 9.1 FL (ref 9–12.9)
POTASSIUM SERPL-SCNC: 3.8 MMOL/L (ref 3.6–5.5)
PROT SERPL-MCNC: 6.6 G/DL (ref 6–8.2)
PROTHROMBIN TIME: 11.9 SEC (ref 12–14.6)
RBC # BLD AUTO: 4.26 M/UL (ref 4.2–5.4)
SODIUM SERPL-SCNC: 136 MMOL/L (ref 135–145)
UFH PPP CHRO-ACNC: <0.1 IU/ML (ref 0–9999)
WBC # BLD AUTO: 10.2 K/UL (ref 4.8–10.8)

## 2022-05-09 PROCEDURE — 770020 HCHG ROOM/CARE - TELE (206)

## 2022-05-09 PROCEDURE — 93971 EXTREMITY STUDY: CPT | Mod: RT

## 2022-05-09 PROCEDURE — 80053 COMPREHEN METABOLIC PANEL: CPT

## 2022-05-09 PROCEDURE — 99223 1ST HOSP IP/OBS HIGH 75: CPT | Mod: AI | Performed by: STUDENT IN AN ORGANIZED HEALTH CARE EDUCATION/TRAINING PROGRAM

## 2022-05-09 PROCEDURE — A9270 NON-COVERED ITEM OR SERVICE: HCPCS | Performed by: STUDENT IN AN ORGANIZED HEALTH CARE EDUCATION/TRAINING PROGRAM

## 2022-05-09 PROCEDURE — 36415 COLL VENOUS BLD VENIPUNCTURE: CPT

## 2022-05-09 PROCEDURE — 85520 HEPARIN ASSAY: CPT

## 2022-05-09 PROCEDURE — 85610 PROTHROMBIN TIME: CPT

## 2022-05-09 PROCEDURE — 700102 HCHG RX REV CODE 250 W/ 637 OVERRIDE(OP): Performed by: STUDENT IN AN ORGANIZED HEALTH CARE EDUCATION/TRAINING PROGRAM

## 2022-05-09 PROCEDURE — 93971 EXTREMITY STUDY: CPT | Mod: 26,RT | Performed by: INTERNAL MEDICINE

## 2022-05-09 PROCEDURE — 85025 COMPLETE CBC W/AUTO DIFF WBC: CPT

## 2022-05-09 PROCEDURE — 85730 THROMBOPLASTIN TIME PARTIAL: CPT

## 2022-05-09 RX ORDER — ALBUTEROL SULFATE 90 UG/1
2 AEROSOL, METERED RESPIRATORY (INHALATION) EVERY 6 HOURS PRN
Status: DISCONTINUED | OUTPATIENT
Start: 2022-05-09 | End: 2022-05-09 | Stop reason: HOSPADM

## 2022-05-09 RX ORDER — IBUPROFEN 400 MG/1
400 TABLET ORAL EVERY 6 HOURS
Qty: 56 TABLET | Refills: 0 | Status: SHIPPED | OUTPATIENT
Start: 2022-05-09 | End: 2022-05-23

## 2022-05-09 RX ORDER — LEVOTHYROXINE SODIUM 0.05 MG/1
150 TABLET ORAL
Status: DISCONTINUED | OUTPATIENT
Start: 2022-05-09 | End: 2022-05-09 | Stop reason: HOSPADM

## 2022-05-09 RX ORDER — HEPARIN SODIUM 1000 [USP'U]/ML
40 INJECTION, SOLUTION INTRAVENOUS; SUBCUTANEOUS PRN
Status: DISCONTINUED | OUTPATIENT
Start: 2022-05-09 | End: 2022-05-09 | Stop reason: HOSPADM

## 2022-05-09 RX ORDER — LOSARTAN POTASSIUM 25 MG/1
25 TABLET ORAL
Status: DISCONTINUED | OUTPATIENT
Start: 2022-05-09 | End: 2022-05-09 | Stop reason: HOSPADM

## 2022-05-09 RX ORDER — METOPROLOL SUCCINATE 25 MG/1
50 TABLET, EXTENDED RELEASE ORAL
Status: DISCONTINUED | OUTPATIENT
Start: 2022-05-09 | End: 2022-05-09 | Stop reason: HOSPADM

## 2022-05-09 RX ORDER — HEPARIN SODIUM 1000 [USP'U]/ML
80 INJECTION, SOLUTION INTRAVENOUS; SUBCUTANEOUS ONCE
Status: DISCONTINUED | OUTPATIENT
Start: 2022-05-09 | End: 2022-05-09 | Stop reason: HOSPADM

## 2022-05-09 RX ORDER — HEPARIN SODIUM 5000 [USP'U]/100ML
0-30 INJECTION, SOLUTION INTRAVENOUS CONTINUOUS
Status: DISCONTINUED | OUTPATIENT
Start: 2022-05-09 | End: 2022-05-09 | Stop reason: HOSPADM

## 2022-05-09 RX ORDER — ROSUVASTATIN CALCIUM 10 MG/1
10 TABLET, COATED ORAL EVERY EVENING
Status: DISCONTINUED | OUTPATIENT
Start: 2022-05-09 | End: 2022-05-09 | Stop reason: HOSPADM

## 2022-05-09 RX ORDER — OMEPRAZOLE 20 MG/1
20 CAPSULE, DELAYED RELEASE ORAL DAILY
Qty: 30 CAPSULE | Refills: 3 | Status: SHIPPED | OUTPATIENT
Start: 2022-05-09

## 2022-05-09 RX ADMIN — LEVOTHYROXINE SODIUM 150 MCG: 0.05 TABLET ORAL at 07:38

## 2022-05-09 RX ADMIN — ASPIRIN 81 MG: 81 TABLET, COATED ORAL at 07:38

## 2022-05-09 ASSESSMENT — COGNITIVE AND FUNCTIONAL STATUS - GENERAL
MOBILITY SCORE: 24
DAILY ACTIVITIY SCORE: 24
SUGGESTED CMS G CODE MODIFIER DAILY ACTIVITY: CH
SUGGESTED CMS G CODE MODIFIER MOBILITY: CH

## 2022-05-09 ASSESSMENT — LIFESTYLE VARIABLES
ALCOHOL_USE: NO
EVER FELT BAD OR GUILTY ABOUT YOUR DRINKING: NO
TOTAL SCORE: 0
ALCOHOL_USE: NO
HAVE YOU EVER FELT YOU SHOULD CUT DOWN ON YOUR DRINKING: NO
TOTAL SCORE: 0
EVER HAD A DRINK FIRST THING IN THE MORNING TO STEADY YOUR NERVES TO GET RID OF A HANGOVER: NO
ON A TYPICAL DAY WHEN YOU DRINK ALCOHOL HOW MANY DRINKS DO YOU HAVE: 0
AVERAGE NUMBER OF DAYS PER WEEK YOU HAVE A DRINK CONTAINING ALCOHOL: 0
AVERAGE NUMBER OF DAYS PER WEEK YOU HAVE A DRINK CONTAINING ALCOHOL: 0
HOW MANY TIMES IN THE PAST YEAR HAVE YOU HAD 5 OR MORE DRINKS IN A DAY: 0
CONSUMPTION TOTAL: NEGATIVE
TOTAL SCORE: 0
HOW MANY TIMES IN THE PAST YEAR HAVE YOU HAD 5 OR MORE DRINKS IN A DAY: 0
EVER HAD A DRINK FIRST THING IN THE MORNING TO STEADY YOUR NERVES TO GET RID OF A HANGOVER: NO
CONSUMPTION TOTAL: INCOMPLETE
ON A TYPICAL DAY WHEN YOU DRINK ALCOHOL HOW MANY DRINKS DO YOU HAVE: 0
HAVE PEOPLE ANNOYED YOU BY CRITICIZING YOUR DRINKING: NO
EVER FELT BAD OR GUILTY ABOUT YOUR DRINKING: NO

## 2022-05-09 ASSESSMENT — ENCOUNTER SYMPTOMS
EYES NEGATIVE: 1
RESPIRATORY NEGATIVE: 1
CARDIOVASCULAR NEGATIVE: 1
PSYCHIATRIC NEGATIVE: 1
GASTROINTESTINAL NEGATIVE: 1
NEUROLOGICAL NEGATIVE: 1

## 2022-05-09 ASSESSMENT — PAIN DESCRIPTION - PAIN TYPE
TYPE: ACUTE PAIN
TYPE: ACUTE PAIN

## 2022-05-09 NOTE — PROGRESS NOTES
Pt arrived to unit via gurney. Ambulated from [place] to bed, x standby assist. Tele monitor applied, vitals taken. Pt assessed. A&O x4. Discussed POC with pt, including possible heparin gtt, pain control options.  Communication board filled out. Questions and concerns addressed, verbalized understanding. Fall precautions in place. Pt demonstrates ability to use call light appropriately. Pt left in lowest position. Bed locked and low.

## 2022-05-09 NOTE — ASSESSMENT & PLAN NOTE
DVT studies showing the ulnar vein in the mid to distal biceps appears non compressible and    does not demonstrate color flow   Orthopedic surgery consulted, will see patient in AM   Will anticoagulate with heparin drip w/ low threshold for stopping/reversal of AC if increased pain, ecchymoses expands  Telemetry monitoring

## 2022-05-09 NOTE — DISCHARGE SUMMARY
Discharge Summary    CHIEF COMPLAINT ON ADMISSION  Chief Complaint   Patient presents with   • Arm Pain     States she had shoulder sx feb or march (pt. Unsure). Woke up 2d ago with ecchymosis, pain, and swelling to R upper arm.       Reason for Admission  post op check, right pain      Admission Date  5/8/2022    CODE STATUS  Full Code    HPI & HOSPITAL COURSE  Ms. Patrick Montague is a 62-year-old female comes into the hospital complaining of right upper arm pain and shoulder pain.  The patient apparently slept wrong on her arm after she just had shoulder surgery in March and the patient's arm became swollen tender and bruised.  The patient thus was admitted to the hospital for further evaluation.  She underwent a imaging study which at this point her the results shows a SVT not a DVT.  I evaluated the images as well as spoke with orthopedics Dr. Larkin who concurs that at this point the imaging results show an SVT and the patient does not need systemic anticoagulation.  The patient at this point needs nonsteroidal anti-inflammatory management.  I have discussed this with the patient as well as the orthopedic surgeon.  Patient understands and at this point she will continue with ibuprofen 400 mg every 6 hours and I have also prescribed her omeprazole 20 mg daily to ensure that there is no gastric side effects from the medications.  Patient otherwise has a follow-up on May 19 with Dr. Larkin in the office.  Patient is in her usual state of health otherwise and doing well.  She is at this point will wanting to discharge home and at this point will be able to continue with outpatient follow-ups and monitoring.  Thus at this point patient is discharged home in a stable condition.    Therefore, she is discharged in good and stable condition to home with close outpatient follow-up.    The patient recovered much more quickly than anticipated on admission.    Discharge Date  5/9/2022    FOLLOW UP ITEMS POST  DISCHARGE  Follow-up with the primary care physician in 2 to 3 days  Follow-up with Dr. Larkin May 19, 2022    DISCHARGE DIAGNOSES  Principal Problem:    Acute deep vein thrombosis (DVT) of right upper extremity (HCC) POA: Unknown  Active Problems:    Hyperlipidemia POA: Unknown    Hypothyroidism POA: Yes    Morbid obesity (HCC) POA: Unknown    Acute deep vein thrombosis (DVT) of other vein of right upper extremity (HCC) POA: Yes    Hypertension POA: Unknown  Resolved Problems:    * No resolved hospital problems. *      FOLLOW UP  No future appointments.  No follow-up provider specified.    MEDICATIONS ON DISCHARGE     Medication List      START taking these medications      Instructions   ibuprofen 400 MG Tabs  Commonly known as: MOTRIN   Take 1 Tablet by mouth every 6 hours for 14 days.  Dose: 400 mg     omeprazole 20 MG delayed-release capsule  Commonly known as: PRILOSEC   Take 1 Capsule by mouth every day.  Dose: 20 mg        CONTINUE taking these medications      Instructions   albuterol 108 (90 Base) MCG/ACT Aers inhalation aerosol   Inhale 2 Puffs every 6 hours as needed.  Dose: 2 Puff     Arnuity Ellipta 100 MCG/ACT Aepb  Generic drug: Fluticasone Furoate   Inhale 2 Puffs every morning.  Dose: 2 Puff     aspirin 81 MG EC tablet   Take 1 Tab by mouth every day.  Dose: 81 mg     levothyroxine 150 MCG Tabs  Commonly known as: SYNTHROID   Take 150 mcg by mouth every morning on an empty stomach.  Dose: 150 mcg     losartan 25 MG Tabs  Commonly known as: COZAAR   TAKE 1 TABLET BY MOUTH EVERY DAY     MAGnesium-Oxide 400 MG Tabs tablet  Generic drug: magnesium oxide   Take 400 mg by mouth 3 times a day.  Dose: 400 mg     metoprolol SR 50 MG Tb24  Commonly known as: TOPROL XL   TAKE 1 TABLET BY MOUTH EVERY DAY     rosuvastatin 10 MG Tabs  Commonly known as: CRESTOR   Take 1 Tablet by mouth every evening.  Dose: 10 mg     vitamin D 1000 Unit (25 mcg) Tabs  Commonly known as: cholecalciferol   Take 1,000 Units by  mouth every day.  Dose: 1,000 Units        STOP taking these medications    Vitamin C 1000 MG Tabs            Allergies  Allergies   Allergen Reactions   • Codeine Vomiting   • Lipitor [Atorvastatin Calcium] Myalgia     .       DIET  Orders Placed This Encounter   Procedures   • Diet Order Diet: Regular     Standing Status:   Standing     Number of Occurrences:   1     Order Specific Question:   Diet:     Answer:   Regular [1]       ACTIVITY  As tolerated.  Do not lift RIGHT arm above the shoulder, no weightbearing with the right arm above 5 pounds    CONSULTATIONS  Orthopedics Dr. Larkin    PROCEDURES  None    LABORATORY  Lab Results   Component Value Date    SODIUM 136 05/09/2022    POTASSIUM 3.8 05/09/2022    CHLORIDE 100 05/09/2022    CO2 24 05/09/2022    GLUCOSE 147 (H) 05/09/2022    BUN 17 05/09/2022    CREATININE 1.23 05/09/2022        Lab Results   Component Value Date    WBC 10.2 05/09/2022    HEMOGLOBIN 12.4 05/09/2022    HEMATOCRIT 39.1 05/09/2022    PLATELETCT 284 05/09/2022        Total time of the discharge process exceeds 38 minutes.

## 2022-05-09 NOTE — ED PROVIDER NOTES
"ED Provider Note    CHIEF COMPLAINT  Chief Complaint   Patient presents with   • Arm Pain     States she had shoulder sx feb or march (pt. Unsure). Woke up 2d ago with ecchymosis, pain, and swelling to R upper arm.       HPI  Ivelisse Montague is a 62 y.o. female who presents for evaluation of right arm pain, swelling, and bruising.  Patient apparently woke up 2 days ago with a \"knot\" near her right bicep which has since progressed to a large amount of purpleish bruising and swelling in the area of the bicep as well as the upper inner arm.  Patient notes the pain has been steadily increasing as well.  She has no numbness or tingling distal to this and does not remember any recent trauma.  She feels she may have been sleeping with her arms folded which may have caused the problem.  She notes she had major shoulder surgery in February of this year by Dr. Larkin from [facility] orthopedics.      REVIEW OF SYSTEMS  Constitutional: No fevers or chills  Skin: Bruising and redness to right upper extremity  HEENT: No sore throat, runny nose, sores, trouble swallowing, trouble speaking.  Neck: No neck pain, stiffness, or masses.  Chest: No pain or rashes  Pulm: No shortness of breath, cough, wheezing, stridor, or pain with inspiration/expiration  Gastrointestinal: No nausea, vomiting, diarrhea, or abdominal pain.  Genitourinary: No dysuria or hematuria  Musculoskeletal: No recent trauma to right upper extremity.  Pain, swelling, and bruising noted  Neurologic: No numbness, tingling, or focal motor weakness to affected extremity  Heme: No bleeding or bruising problems.   Immuno: No hx of recurrent infections    PAST FAM HISTORY  Family History   Problem Relation Age of Onset   • Diabetes Mother    • Lung Disease Mother    • Cancer Mother         CLL   • Osteoporosis Mother    • Asthma Mother    • Heart Attack Father    • Thyroid Sister    • Heart Disease Brother    • Diabetes Brother    • Heart Attack Brother  " "  • Lung Disease Brother    • Bladder cancer Brother    • Heart Disease Brother    • Heart Attack Brother        PAST MEDICAL HISTORY   has a past medical history of Arthritis, Asthma, Back pain, CAD (coronary artery disease), CHF (congestive heart failure) ([facility]), GERD (gastroesophageal reflux disease), Heart burn, High cholesterol, Hyperlipidemia, Hypertension, Indigestion, RLS (restless legs syndrome), Sleep apnea, and Thyroid disease.    SOCIAL HISTORY  Social History     Tobacco Use   • Smoking status: Former Smoker     Packs/day: 1.00     Years: 40.00     Pack years: 40.00     Types: Cigarettes     Quit date: 2008     Years since quittin.0   • Smokeless tobacco: Never Used   • Tobacco comment: 1-1.5 PPD   Vaping Use   • Vaping Use: Never used   Substance and Sexual Activity   • Alcohol use: No   • Drug use: No   • Sexual activity: Not on file       SURGICAL HISTORY   has a past surgical history that includes knee replacement, total (Bilateral, ); zzz cardiac cath; wrist fusion (Left, ); lumbar laminectomy diskectomy (); and reconstr total shoulder implant (Right, 2022).    CURRENT MEDICATIONS  Home Medications    **Home medications have not yet been reviewed for this encounter**         ALLERGIES  Allergies   Allergen Reactions   • Codeine Vomiting   • Lipitor [Atorvastatin Calcium] Myalgia     .       PHYSICAL EXAM  VITAL SIGNS: /72   Pulse 65   Temp 36.4 °C (97.6 °F)   Resp 18   Ht 1.575 m (5' 2\")   Wt 121 kg (267 lb 3.2 oz)   SpO2 94%   BMI 48.87 kg/m²    Gen: Alert in no apparent distress.  HEENT: No signs of trauma, Bilateral external ears normal, Nose normal. Conjunctiva normal, Non-icteric.   Cardiovascular: Regular rate and rhythm, no murmurs.  Capillary refill less than 3 seconds to all extremities, 2+ distal pulses.  Thorax & Lungs: Normal breath sounds, No respiratory distress, No wheezing bilateral chest rise  Abdomen: Bowel sounds normal, Soft, No tenderness, " No masses, No pulsatile masses. No Guarding or rebound  Skin: Warm, Dry, No erythema, No rash noted to exposed areas.    Extremities: Intact distal pulses to affected extremity.  Dense ecchymosis as noted in picture below to right upper extremity generally confined to the area of the bicep although medially it does extend down past the antecubital fossa.  This area is tender and there appears to be an indurated mass toward the proximal end of the ecchymosis.  Neurologic: Alert , no facial droop, grossly normal coordination and strength  Psychiatric: Affect pleasant            LABS  Results for orders placed or performed in visit on 02/18/22   SARS-CoV-2 PCR (24 hour In-House): Collect NP swab in VTM    Specimen: Respirate   Result Value Ref Range    SARS-CoV-2 Source Nasal Swab     SARS-CoV-2 by PCR NotDetected    COVID/SARS CoV-2 PCR   Result Value Ref Range    COVID Order Status Received        RADIOLOGY  US-EXTREMITY VENOUS UPPER UNILAT RIGHT   Final Result            COURSE & MEDICAL DECISION MAKING  Patient arrives for evaluation of symptoms that are highly suggestive of some form of muscular tear although she is concerned for DVT.  Unfortunately D-dimer will be useless in this case and we will need to proceed directly with ultrasound to rule out a DVT.  She does not appear septic or toxic and has no chest pain or shortness of breath to suggest pulmonary embolism.  Hopefully the scan will be negative and she can be discharged with outpatient orthopedic follow-up.    Patient's DVT scan was concerning for possible DVT in the ulnar vein.  I spoke with the ultrasound tech as well who noted that there was a portion of the ulnar vein that was noncompressible and did not show color Doppler flow.  Patient was also markedly more tender in this area during the exam.  This will need to be evaluated further as an inpatient as she has 2 possible separate issues, the first is some form of bleeding likely related to a biceps  injury, and the second appears to be a blood clot in a deep vein of her right upper extremity.  Unclear which came first however.  It presents a dilemma with anticoagulation however.  We will discussed the case with the hospitalist.  Likely will need to consult orthopedic surgery from the emergency department as well.    Discussed case with Dr. Larkin, [facility] orthopedics, who noted that here somebody from his group will consult on the patient while she is in the hospital. He recommends further consultation regarding treatment of the DVT with anticoagulation as the risks may outweigh the benefits.    FINAL IMPRESSION  1. Deep vein thrombosis (DVT) of ulnar vein of right upper extremity, unspecified chronicity (HCC)    2. Biceps muscle tear, right, initial encounter        Electronically signed by: Papi Kelly M.D., 5/9/2022 1:00 AM

## 2022-05-09 NOTE — DISCHARGE INSTRUCTIONS
Discharge Instructions    Discharged to home by car with relative. Discharged via walking, hospital escort: Refused.  Special equipment needed: Not Applicable    Be sure to schedule a follow-up appointment with your primary care doctor or any specialists as instructed.     Discharge Plan:   Diet Plan: Discussed  Activity Level: Discussed  Confirmed Follow up Appointment: Patient to Call and Schedule Appointment  Confirmed Symptoms Management: Discussed  Medication Reconciliation Updated: Yes    I understand that a diet low in cholesterol, fat, and sodium is recommended for good health. Unless I have been given specific instructions below for another diet, I accept this instruction as my diet prescription.   Other diet: regular    Special Instructions: None    · Is patient discharged on Warfarin / Coumadin?   No     Depression / Suicide Risk    As you are discharged from this [facility] facility, it is important to learn how to keep safe from harming yourself.    Recognize the warning signs:  · Abrupt changes in personality, positive or negative- including increase in energy   · Giving away possessions  · Change in eating patterns- significant weight changes-  positive or negative  · Change in sleeping patterns- unable to sleep or sleeping all the time   · Unwillingness or inability to communicate  · Depression  · Unusual sadness, discouragement and loneliness  · Talk of wanting to die  · Neglect of personal appearance   · Rebelliousness- reckless behavior  · Withdrawal from people/activities they love  · Confusion- inability to concentrate     If you or a loved one observes any of these behaviors or has concerns about self-harm, here's what you can do:  · Talk about it- your feelings and reasons for harming yourself  · Remove any means that you might use to hurt yourself (examples: pills, rope, extension cords, firearm)  · Get professional help from the community (Mental Health, Substance Abuse, psychological  counseling)  · Do not be alone:Call your Safe Contact- someone whom you trust who will be there for you.  · Call your local CRISIS HOTLINE 690-1202 or 338-436-7022  · Call your local Children's Mobile Crisis Response Team [place] (208) 275-1649 or www.Showkicker  · Call the toll free National Suicide Prevention Hotlines   · National Suicide Prevention Lifeline 526-151-KSGO (9690)  · National Hope Line Network 800-SUICIDE (608-4376)      Venous Thromboembolism  Venous thromboembolism (VTE) is a condition in which a blood clot (thrombus) develops in the body. A thrombus usually occurs in a deep vein in the leg or the pelvis, but it can also occur in the arm. Sometimes, pieces of a thrombus can break off from its original place of development and travel through the bloodstream to other parts of the body. When that happens, the thrombus is called an embolism. An embolism can block the blood flow in the blood vessels of other organs.  There are two serious types of VTE:  · Deep vein thrombosis (DVT). A DVT is a thrombus that usually occurs in a deep, larger vein of the lower leg or the pelvis, or in an upper extremity such as the arm.  · Pulmonary embolism (PE). A PE occurs when an embolism has formed and traveled to the lungs. A PE can block or decrease the blood flow in one lung or both lungs.  VTE is a serious health condition that can cause disability or death. It is very important to get help right away and to not ignore symptoms.  What are the causes?  VTE is caused by the formation of a blood clot in your leg, pelvis, or arm. Usually, several things contribute to the formation of blood clots. A clot may develop when:  · Your blood flow slows down.  · Your vein becomes damaged in some way.  · You have a condition that makes your blood clot more easily.  What increases the risk?  A VTE is more likely to develop in:  · People who are older, especially over 60 years of age.  · People who are overweight  (obese).  · People who sit or lie still for a long time, such as during long-distance travel (over 4 hours), bed rest, hospitalization, or during recovery from certain medical conditions like a stroke.  · People who do not engage in much physical activity (sedentary lifestyle).  · People who have chronic breathing disorders.  · People who have a personal or family history of blood clots or blood clotting disease.  · People who have peripheral vascular disease (PVD), diabetes, or some types of cancer.  · People who have heart disease, especially if the person had a recent heart attack or has congestive heart failure.  · People who have neurological diseases that affect the legs (leg paresis).  · People who have had a traumatic injury, such as breaking a hip or leg.  · People who have recently had major or lengthy surgery, especially on the hip, knee, or abdomen.  · People who have had a central line placed inside a large vein.  · People who take medicines that contain the hormone estrogen. These include birth control pills and hormone replacement therapy.  · Pregnancy or during childbirth or the postpartum period.  · Long plane flights (over 8 hours).  What are the signs or symptoms?  Symptoms of VTE can depend on where the clot is located and whether the clot breaks off and travels to another organ. Sometimes, there may be no symptoms.  Symptoms of a DVT can include:  · Swelling of your leg or arm, especially if one side is much worse.  · Warmth and redness of your leg or arm, especially if one side is much worse.  · Pain in your arm or leg. If the clot is in your leg, symptoms may be more noticeable or worse when you stand or walk.  · A feeling of pins and needles if the clot is in the arm.  The symptoms of a PE usually start suddenly and include:  · Shortness of breath while active or at rest.  · Coughing or coughing up blood or blood-tinged mucus.  · Chest pain that is often worse with deep breaths.  · Rapid or  irregular heartbeat.  · Feeling light-headed or dizzy.  · Fainting.  · Feeling anxious.  · Sweating.  There may also be pain and swelling in a leg if that is where the blood clot started.  These symptoms may represent a serious problem that is an emergency. Do not wait to see if the symptoms will go away. Get medical help right away. Call your local emergency services (911 in the [place] Do not drive yourself to the hospital.   How is this diagnosed?  Your health care provider will take a medical history and perform a physical exam. You may also have other tests, including:  · Blood tests to assess the clotting properties of your blood.  · Imaging tests, such as CT, ultrasound, MRI, X-ray, and other tests to see if you have clots anywhere in your body.  · An electrocardiogram (ECG) to look for heart strain from blood clots in the lungs.  · An echocardiogram.  How is this treated?  After a VTE is identified, it can be treated. The main goals of treatment are:  · To stop a blood clot from growing larger.  · To stop new blood clots from forming.  · To stop a blood clot from traveling to the lungs (pulmonary embolism).  The type of treatment that you receive depends on many factors, such as the cause of your VTE, your risk for bleeding or developing more clots, and other medical conditions that you have. Sometimes, a combination of treatments is necessary. Treatment options may be combined and include:  · Monitoring the blood clot with ultrasound.  · Taking medicines by mouth, such as newer blood thinners (anticoagulants), thrombolytics, or warfarin.  · Taking anticoagulant medicine by injection or through an IV tube.  · Wearing compression stockings or using different types of devices.  · Surgery (rare) to remove the blood clot or to place a filter in your abdomen to stop the blood clot from traveling to your lungs.  Treatments for VTE are often divided into immediate treatment and long-term treatment (up to 3 months  after VTE). You can work with your health care provider to choose the treatment program that is best for you.  Follow these instructions at home:  If you are taking a newer oral anticoagulant:  · Take the medicine every single day at the same time each day.  · Understand what foods and drugs interact with this medicine.  · Understand that there are no regular blood tests required when using this medicine.  · Understand the side effects of this medicine, including excessive bruising or bleeding. Ask your health care provider or pharmacist about other possible side effects.  If you are taking warfarin:  · Understand how to take warfarin and know which foods can affect how warfarin works in your body.  · Understand that it is dangerous to take too much or too little warfarin. Too much warfarin increases the risk of bleeding. Too little warfarin continues to allow the risk for blood clots.  · Follow your PT and INR blood testing schedule. The PT and INR results allow your health care provider to adjust your dose of warfarin. It is very important that you have your PT and INR tested as often as told by your health care provider.  · Avoid major changes in your diet, or tell your health care provider before you change your diet. Arrange a visit with a registered dietitian to answer your questions. Many foods, especially foods that are high in vitamin K, can interfere with warfarin and affect the PT and INR results. Eat a consistent amount of foods that are high in vitamin K, such as:  ¨ Spinach, kale, broccoli, cabbage, daniela greens, turnip greens, Benedict sprouts, peas, cauliflower, seaweed, and parsley.  ¨ Beef liver and pork liver.  ¨ Green tea.  ¨ Soybean oil.  · Tell your health care provider about any and all medicines, vitamins, and supplements that you take, including aspirin and other over-the-counter anti-inflammatory medicines. Be especially cautious with aspirin and anti-inflammatory medicines. Do not take  those before you ask your health care provider if it is safe to do so. This is important because many medicines can interfere with warfarin and affect the PT and INR results.  · Do not start or stop taking any over-the-counter or prescription medicine unless your health care provider or pharmacist tells you to do so.  If you take warfarin, you will also need to do these things:  · Hold pressure over cuts for longer than usual.  · Tell your dentist and other health care providers that you are taking warfarin before you have any procedures in which bleeding may occur.  · Avoid alcohol or drink very small amounts. Tell your health care provider if you change your alcohol intake.  · Do not use tobacco products, including cigarettes, chewing tobacco, and e-cigarettes. If you need help quitting, ask your health care provider.  · Avoid contact sports.  General instructions  · Take over-the-counter and prescription medicines only as told by your health care provider. Anticoagulant medicines can have side effects, including easy bruising and difficulty stopping bleeding. If you are prescribed an anticoagulant, you will also need to do these things:  ¨ Hold pressure over cuts for longer than usual.  ¨ Tell your dentist and other health care providers that you are taking anticoagulants before you have any procedures in which bleeding may occur.  ¨ Avoid contact sports.  · Wear a medical alert bracelet or carry a medical alert card that says you have had a PE.  · Ask your health care provider how soon you can go back to your normal activities. Stay active to prevent new blood clots from forming.  · Make sure to exercise while traveling or when you have been sitting or standing for a long period of time. It is very important to exercise. Exercise your legs by walking or by tightening and relaxing your leg muscles often. Take frequent walks.  · Wear compression stockings as told by your health care provider to help prevent more  blood clots from forming.  · Do not use tobacco products, including cigarettes, chewing tobacco, and e-cigarettes. If you need help quitting, ask your health care provider.  · Keep all follow-up appointments with your health care provider. This is important.  How is this prevented?  Take these actions to decrease your risk of developing another VTE:  · Exercise regularly. For at least 30 minutes every day, engage in:  ¨ Activity that involves moving your arms and legs.  ¨ Activity that encourages good blood flow through your body by increasing your heart rate.  · Exercise your arms and legs every hour during long-distance travel (over 4 hours). Drink plenty of water and avoid drinking alcohol while traveling.  · Avoid sitting or lying in bed for long periods of time without moving your legs.  · Maintain a weight that is appropriate for your height. Ask your health care provider what weight is healthy for you.  · If you are a woman who is over 35 years of age, avoid unnecessary use of medicines that contain estrogen. These include birth control pills.  · Do not smoke, especially if you take estrogen medicines. If you need help quitting, ask your health care provider.  If you are hospitalized, prevention measures may include:  · Early walking after surgery, as soon as your health care provider says that it is safe.  · Receiving anticoagulants to prevent blood clots. If you cannot take anticoagulants, other options may be available, such as wearing compression stockings or using different types of devices.  Get help right away if:  · You have new or increased pain, swelling, or redness in an arm or leg.  · You have numbness or tingling in an arm or leg.  · You have shortness of breath while active or at rest.  · You have chest pain.  · You have a rapid or irregular heartbeat.  · You feel light-headed or dizzy.  · You cough up blood.  · You notice blood in your vomit, bowel movement, or urine.  These symptoms may  represent a serious problem that is an emergency. Do not wait to see if the symptoms will go away. Get medical help right away. Call your local emergency services (911 in the [place] Do not drive yourself to the hospital.   This information is not intended to replace advice given to you by your health care provider. Make sure you discuss any questions you have with your health care provider.  Document Released: 10/15/2010 Document Revised: 05/31/2017 Document Reviewed: 04/13/2016  Elsevier Interactive Patient Education © 2017 [employer]

## 2022-05-09 NOTE — H&P
Hospital Medicine History & Physical Note    Date of Service  5/9/2022    Primary Care Physician  HUNTER Carroll.    Consultants  Orthopedic surgery    Code Status  Full Code    Chief Complaint  Chief Complaint   Patient presents with   • Arm Pain     States she had shoulder sx feb or march (pt. Unsure). Woke up 2d ago with ecchymosis, pain, and swelling to R upper arm.       History of Presenting Illness  Ivelisse Montague is a 62 y.o. female who presented 5/8/2022 with  right upper extremity swelling pain and stiffness that started last Friday.    In February 2022, patient had a right reverse total shoulder arthroplasty.    Since then, she has noted pain and swelling in right upper extremity that started last Friday.  In addition, she noted area of bruising at her right bicep that has been progressively getting more larger. As her symptoms continued to get worse, she decided to come into the Ed for evaluation. She denies chest pain shortness of breath fever chills abdominal pain nausea vomiting.    In the ED, patient had normal vital signs.  DVT studies of right upper extremity showing the ulnar vein in the mid to distal bicep appears noncompressible and does not demonstrate color flow, consistent with DVT.  Orthopedic surgery consulted, will see patient in a.m.    I discussed the plan of care with patient.    Review of Systems  Review of Systems   Constitutional: Positive for malaise/fatigue.   HENT: Negative.    Eyes: Negative.    Respiratory: Negative.    Cardiovascular: Negative.    Gastrointestinal: Negative.    Genitourinary: Negative.    Skin: Negative.    Neurological: Negative.    Endo/Heme/Allergies: Negative.    Psychiatric/Behavioral: Negative.        Past Medical History   has a past medical history of Arthritis, Asthma, Back pain, CAD (coronary artery disease), CHF (congestive heart failure) ([facility]), GERD (gastroesophageal reflux disease), Heart burn, High cholesterol, Hyperlipidemia,  Hypertension, Indigestion, RLS (restless legs syndrome), Sleep apnea, and Thyroid disease.    Surgical History   has a past surgical history that includes knee replacement, total (Bilateral, 2007); zzz cardiac cath; wrist fusion (Left, 1989); lumbar laminectomy diskectomy (2005); and pr reconstr total shoulder implant (Right, 2/23/2022).     Family History  family history includes Asthma in her mother; Bladder cancer in her brother; Cancer in her mother; Diabetes in her brother and mother; Heart Attack in her brother, brother, and father; Heart Disease in her brother and brother; Lung Disease in her brother and mother; Osteoporosis in her mother; Thyroid in her sister.   Family history reviewed with patient. There is no family history that is pertinent to the chief complaint.     Social History   reports that she quit smoking about 14 years ago. Her smoking use included cigarettes. She has a 40.00 pack-year smoking history. She has never used smokeless tobacco. She reports that she does not drink alcohol and does not use drugs.    Allergies  Allergies   Allergen Reactions   • Codeine Vomiting   • Lipitor [Atorvastatin Calcium] Myalgia     .       Medications  Prior to Admission Medications   Prescriptions Last Dose Informant Patient Reported? Taking?   ARNUITY ELLIPTA 100 MCG/ACT AEROSOL POWDER, BREATH ACTIVATED   Yes No   Sig: Inhale every day.   Ascorbic Acid (VITAMIN C) 1000 MG Tab   Yes No   Sig: Take 2,000 mg by mouth every day.   MAGNESIUM-OXIDE 400 (241.3 Mg) MG Tab tablet   Yes No   Sig: Take 400 mg by mouth 3 times a day.   albuterol 108 (90 Base) MCG/ACT Aero Soln inhalation aerosol   Yes No   Sig: Inhale 2 Puffs every 6 hours as needed.   aspirin 81 MG EC tablet   No No   Sig: Take 1 Tab by mouth every day.   levothyroxine (SYNTHROID) 150 MCG Tab   Yes No   Sig: Take 150 mcg by mouth every morning on an empty stomach.   losartan (COZAAR) 25 MG Tab   No No   Sig: TAKE 1 TABLET BY MOUTH EVERY DAY    metoprolol SR (TOPROL XL) 50 MG TABLET SR 24 HR   No No   Sig: TAKE 1 TABLET BY MOUTH EVERY DAY   omeprazole (PRILOSEC) 40 MG delayed-release capsule   Yes No   Sig: Take  by mouth every day.   rosuvastatin (CRESTOR) 10 MG Tab   No No   Sig: Take 1 Tablet by mouth every evening.   vitamin D (CHOLECALCIFEROL) 1000 UNIT Tab   Yes No   Sig: Take 1,000 Units by mouth every day.      Facility-Administered Medications: None       Physical Exam  Temp:  [36.4 °C (97.6 °F)-36.8 °C (98.2 °F)] 36.4 °C (97.6 °F)  Pulse:  [57-64] 64  Resp:  [18-20] 20  BP: (101-134)/(65-90) 118/67  SpO2:  [93 %-97 %] 95 %  Blood Pressure: 118/67   Temperature: 36.4 °C (97.6 °F)   Pulse: 64   Respiration: 20   Pulse Oximetry: 95 %       Physical Exam  Constitutional:       Appearance: Normal appearance. She is obese.   HENT:      Head: Normocephalic.      Nose: Nose normal.      Mouth/Throat:      Mouth: Mucous membranes are moist.   Eyes:      Pupils: Pupils are equal, round, and reactive to light.   Cardiovascular:      Rate and Rhythm: Normal rate and regular rhythm.      Pulses: Normal pulses.   Pulmonary:      Effort: Pulmonary effort is normal.      Breath sounds: Normal breath sounds.   Abdominal:      General: Abdomen is flat. Bowel sounds are normal.      Palpations: Abdomen is soft.   Skin:     General: Skin is warm.      Comments: RUE swelling, more prominent at R bicep.  Capillary refill normal  Radial pulse 2+ BL    Neurological:      General: No focal deficit present.      Mental Status: She is alert and oriented to person, place, and time. Mental status is at baseline.   Psychiatric:         Mood and Affect: Mood normal.         Behavior: Behavior normal.         Thought Content: Thought content normal.         Judgment: Judgment normal.         Laboratory:          No results for input(s): ALTSGPT, ASTSGOT, ALKPHOSPHAT, TBILIRUBIN, DBILIRUBIN, GAMMAGT, AMYLASE, LIPASE, ALB, PREALBUMIN, GLUCOSE in the last 72 hours.      No  results for input(s): NTPROBNP in the last 72 hours.      No results for input(s): TROPONINT in the last 72 hours.    Imaging:  US-EXTREMITY VENOUS UPPER UNILAT RIGHT             no X-Ray or EKG requiring interpretation    Assessment/Plan:  Justification for Admission Status  I anticipate this patient is appropriate for observation status at this time because observation    * Acute deep vein thrombosis (DVT) of right upper extremity (HCC)  Assessment & Plan  DVT studies showing the ulnar vein in the mid to distal biceps appears non compressible and    does not demonstrate color flow   Orthopedic surgery consulted, will see patient in AM   Will anticoagulate with heparin drip w/ low threshold for stopping/reversal of AC if increased pain, ecchymoses expands  Telemetry monitoring        Hypertension  Assessment & Plan  Restart     Morbid obesity (HCC)  Assessment & Plan  15 minutes spent counseling patient on weight loss, nutrition, and healthy lifestyle      Hypothyroidism- (present on admission)  Assessment & Plan  Continue synthroid     Hyperlipidemia  Assessment & Plan  Continue crestor       VTE prophylaxis: therapeutic anticoagulation with heparin drip

## 2022-05-09 NOTE — PROGRESS NOTES
4 Eyes Skin Assessment Completed by SAUL Urban and SAUL Jackman.    Head WDL  Ears WDL  Nose WDL  Mouth WDL  Neck WDL  Breast/Chest WDL  Shoulder Blades WDL  Spine WDL  (R) Arm/Elbow/Hand Redness, Bruising, Swelling and Scar  (L) Arm/Elbow/Hand WDL  Abdomen WDL  Groin WDL  Scrotum/Coccyx/Buttocks WDL  (R) Leg WDL  (L) Leg WDL  (R) Heel/Foot/Toe WDL  (L) Heel/Foot/Toe WDL      Devices In Places Tele Box, PIV x 1      Interventions In Place Pillows and Pressure Redistribution Mattress    Possible Skin Injury No    Pictures Uploaded Into Epic N/A  Wound Consult Placed N/A  RN Wound Prevention Protocol Ordered No

## 2022-05-09 NOTE — PROGRESS NOTES
Discharge order written. IV removed, patient tolerated. All personal belongings are in possession. AVS printed, reviewed and copy signed and placed on the chart. Patient has no further questions. Prescriptions sent to [facility] pharmacy. Discharged in satisfactory condition. Pt left unit with self via walking. Staff escort: CNA.

## 2022-05-09 NOTE — ED NOTES
Right arm circumference is 54 cm. Area measured was marked, signed, dated, and timed for future comparisons.

## 2022-05-09 NOTE — CARE PLAN
The patient is Stable - Low risk of patient condition declining or worsening    Shift Goals  Clinical Goals: Pt will report pain within tolerable levels  Patient Goals: Pain control, discharge    Progress made toward(s) clinical / shift goals:  Pt reports pain at tolerable level, declines interventions at this time.     Patient is not progressing towards the following goals: n/a

## 2022-08-15 DIAGNOSIS — I10 HTN (HYPERTENSION), MALIGNANT: ICD-10-CM

## 2022-08-15 DIAGNOSIS — I50.9 CONGESTIVE HEART FAILURE, NYHA CLASS 2 AND ACC/AHA STAGE C (HCC): ICD-10-CM

## 2022-08-15 NOTE — TELEPHONE ENCOUNTER
Is the patient due for a refill? Yes    Was the patient seen the past year? Yes    Date of last office visit: 10/25/2021    Does the patient have an upcoming appointment?  No       Provider to refill: TITA    Does the patients insurance require a 100 day supply?  No

## 2022-08-16 RX ORDER — METOPROLOL SUCCINATE 50 MG/1
TABLET, EXTENDED RELEASE ORAL
Qty: 90 TABLET | Refills: 0 | Status: SHIPPED | OUTPATIENT
Start: 2022-08-16 | End: 2022-11-18

## 2022-08-16 RX ORDER — LOSARTAN POTASSIUM 25 MG/1
TABLET ORAL
Qty: 90 TABLET | Refills: 0 | Status: SHIPPED | OUTPATIENT
Start: 2022-08-16 | End: 2022-11-18

## 2022-11-17 ENCOUNTER — TELEPHONE (OUTPATIENT)
Dept: CARDIOLOGY | Facility: MEDICAL CENTER | Age: 62
End: 2022-11-17
Payer: MEDICARE

## 2022-11-17 DIAGNOSIS — I50.9 CONGESTIVE HEART FAILURE, NYHA CLASS 2 AND ACC/AHA STAGE C (HCC): ICD-10-CM

## 2022-11-17 DIAGNOSIS — I10 HTN (HYPERTENSION), MALIGNANT: ICD-10-CM

## 2022-11-17 NOTE — TELEPHONE ENCOUNTER
Is the patient due for a refill? Yes    Was the patient seen the past year? Yes    Date of last office visit: 10/25/2021    Does the patient have an upcoming appointment?  No       Provider to refill:TITA    Does the patients insurance require a 100 day supply?  No

## 2022-11-18 RX ORDER — LOSARTAN POTASSIUM 25 MG/1
TABLET ORAL
Qty: 90 TABLET | Refills: 0 | Status: SHIPPED | OUTPATIENT
Start: 2022-11-18

## 2022-11-18 RX ORDER — METOPROLOL SUCCINATE 50 MG/1
TABLET, EXTENDED RELEASE ORAL
Qty: 90 TABLET | Refills: 0 | Status: SHIPPED | OUTPATIENT
Start: 2022-11-18

## 2022-11-18 NOTE — TELEPHONE ENCOUNTER
LAST 90 day curtesy refill     ---------------------------------------------    Route to  pool  Return in about 3 months (around 1/25/2022)  Please call pt to schedule FU per TITA and required for further refills. NO FURTHER REFILLS !

## 2022-11-22 DIAGNOSIS — E78.5 HYPERLIPIDEMIA, UNSPECIFIED HYPERLIPIDEMIA TYPE: ICD-10-CM

## 2022-11-22 RX ORDER — ROSUVASTATIN CALCIUM 10 MG/1
TABLET, COATED ORAL
Qty: 90 TABLET | Refills: 0 | Status: SHIPPED | OUTPATIENT
Start: 2022-11-22

## 2022-11-22 NOTE — TELEPHONE ENCOUNTER
Is the patient due for a refill? Yes    Was the patient seen the past year? No    Date of last office visit: 10/25/21    Does the patient have an upcoming appointment?  No       Provider to refill:TITA    Does the patients insurance require a 100 day supply?  No

## 2023-03-20 ENCOUNTER — APPOINTMENT (OUTPATIENT)
Dept: URGENT CARE | Facility: PHYSICIAN GROUP | Age: 63
End: 2023-03-20
Payer: MEDICARE

## 2023-06-14 NOTE — PROGRESS NOTES
Patient is 6 week out from surgery.  We need to wean her med use.  I will drop her to the 5/325 mg tablets.  She should also make a follow up appt with her pain management provider so that they can take over her med prescribing again in the next couple of weeks.     Report received. Patient A&O x 4. Reports no pain.  VS Stable. POC discussed, patient verbalized understanding. Belongings and bedside table within reach. Bed in low and locked positions. Fall precautions in place. Patient educated to call for assistance. Hourly rounding in place. No other needs at this time.

## (undated) DEVICE — SUTURE 3-0 MONOCRYL PLUS PS-1 - 27 INCH (36/BX)

## (undated) DEVICE — SENSOR SPO2 NEO LNCS ADHESIVE (20/BX) SEE USER NOTES

## (undated) DEVICE — SODIUM CHL IRRIGATION 0.9% 1000ML (12EA/CA)

## (undated) DEVICE — DRAPE LARGE 3 QUARTER - (20/CA)

## (undated) DEVICE — Device

## (undated) DEVICE — PROTECTOR ULNA NERVE - (36PR/CA)

## (undated) DEVICE — SODIUM CHL. IRRIGATION 0.9% 3000ML (4EA/CA 65CA/PF)

## (undated) DEVICE — KIT EVACUATER 3 SPRING PVC LF 1/8 DRAIN SIZE (10EA/CA)"

## (undated) DEVICE — TOWELS CLOTH SURGICAL - (4/PK 20PK/CA)

## (undated) DEVICE — SUCTION INSTRUMENT YANKAUER BULBOUS TIP W/O VENT (50EA/CA)

## (undated) DEVICE — GLOVE BIOGEL PI INDICATOR SZ 7.5 SURGICAL PF LF -(50/BX 4BX/CA)

## (undated) DEVICE — ELECTRODE DUAL RETURN W/ CORD - (50/PK)

## (undated) DEVICE — SUTURE 0 VICRYL PLUS CT-1 - 8 X 18 INCH (12/BX)

## (undated) DEVICE — DERMABOND ADVANCED - (12EA/BX)

## (undated) DEVICE — DRESSING AQUACEL AG ADVANTAGE 3.5 X 10" (10EA/BX)"

## (undated) DEVICE — STOCKINETTE IMPERVIOUS 12X48 - STERILELF (10/CA)"

## (undated) DEVICE — KIT ANESTHESIA W/CIRCUIT & 3/LT BAG W/FILTER (20EA/CA)

## (undated) DEVICE — BLADE SAGITTAL SAW DUAL CUT 25.0 X 90.0 X 1.27MM (1/EA)

## (undated) DEVICE — GLOVE BIOGEL PI INDICATOR SZ 8.0 SURGICAL PF LF -(50/BX 4BX/CA)

## (undated) DEVICE — FIBERWIRE 2.0 (12EA/BX)

## (undated) DEVICE — DRAPE SURGICAL U 77X120 - (10/CA)

## (undated) DEVICE — CLOSURE SKIN STRIP 1/2 X 4 IN - (STERI STRIP) (50/BX 4BX/CA)

## (undated) DEVICE — SET EXTENSION WITH 2 PORTS (48EA/CA) ***PART #2C8610 IS A SUBSTITUTE*****

## (undated) DEVICE — TUBING CLEARLINK DUO-VENT - C-FLO (48EA/CA)

## (undated) DEVICE — GOWN WARMING STANDARD FLEX - (30/CA)

## (undated) DEVICE — HANDPIECE 10FT INTPLS SCT PLS IRRIGATION HAND CONTROL SET (6/PK)

## (undated) DEVICE — PACK TOTAL HIP - (1/CA)

## (undated) DEVICE — BLADE SAGITTAL SAW 90 X 12.5 X .89MM

## (undated) DEVICE — SLING ORTH UNV TIETX VLFM ARM

## (undated) DEVICE — LACTATED RINGERS INJ 1000 ML - (14EA/CA 60CA/PF)

## (undated) DEVICE — DRAPE U SPLIT IMP 54 X 76 - (24/CA)

## (undated) DEVICE — ELECTRODE 850 FOAM ADHESIVE - HYDROGEL RADIOTRNSPRNT (50/PK)

## (undated) DEVICE — HEAD HOLDER JUNIOR/ADULT

## (undated) DEVICE — PAD LAP STERILE 18 X 18 - (5/PK 40PK/CA)

## (undated) DEVICE — DRAPE STRLE REG TOWEL 18X24 - (10/BX 4BX/CA)"

## (undated) DEVICE — NEPTUNE 4 PORT MANIFOLD - (20/PK)

## (undated) DEVICE — CANISTER SUCTION 3000ML MECHANICAL FILTER AUTO SHUTOFF MEDI-VAC NONSTERILE LF DISP  (40EA/CA)

## (undated) DEVICE — SUTURE 0 SILK TIES (36PK/BX)

## (undated) DEVICE — TOWEL STOP TIMEOUT SAFETY FLAG (40EA/CA)

## (undated) DEVICE — SET LEADWIRE 5 LEAD BEDSIDE DISPOSABLE ECG (1SET OF 5/EA)

## (undated) DEVICE — TIP INTPLS HFLO ML ORFC BTRY - (12/CS)  FOR SURGILAV

## (undated) DEVICE — GLOVE SZ 8 BIOGEL PI MICRO - PF LF (50PR/BX)

## (undated) DEVICE — MASK ANESTHESIA ADULT  - (100/CA)

## (undated) DEVICE — DRAPE VAGINAL BIB W/ POUCH (10EA/CA)

## (undated) DEVICE — BOVIE NEEDLE TIP INSULATD NON-SAFETY 2CM (50/PK)

## (undated) DEVICE — SUTURE 2-0 VICRYL PLUS CT-1 - 8 X 18 INCH(12/BX)